# Patient Record
Sex: MALE | Race: WHITE | Employment: OTHER | URBAN - METROPOLITAN AREA
[De-identification: names, ages, dates, MRNs, and addresses within clinical notes are randomized per-mention and may not be internally consistent; named-entity substitution may affect disease eponyms.]

---

## 2017-04-27 ENCOUNTER — HOSPITAL ENCOUNTER (EMERGENCY)
Facility: CLINIC | Age: 82
Discharge: HOME | End: 2017-04-27
Attending: INTERNAL MEDICINE | Admitting: INTERNAL MEDICINE
Payer: COMMERCIAL

## 2017-04-27 VITALS — SYSTOLIC BLOOD PRESSURE: 156 MMHG | TEMPERATURE: 97.4 F | DIASTOLIC BLOOD PRESSURE: 78 MMHG | OXYGEN SATURATION: 92 %

## 2017-04-27 DIAGNOSIS — T83.511A URINARY TRACT INFECTION ASSOCIATED WITH CATHETERIZATION OF URINARY TRACT, UNSPECIFIED INDWELLING URINARY CATHETER TYPE, INITIAL ENCOUNTER (H): ICD-10-CM

## 2017-04-27 DIAGNOSIS — T83.9XXA FOLEY CATHETER PROBLEM, INITIAL ENCOUNTER (H): ICD-10-CM

## 2017-04-27 DIAGNOSIS — N39.0 URINARY TRACT INFECTION ASSOCIATED WITH CATHETERIZATION OF URINARY TRACT, UNSPECIFIED INDWELLING URINARY CATHETER TYPE, INITIAL ENCOUNTER (H): ICD-10-CM

## 2017-04-27 LAB
ALBUMIN UR-MCNC: 100 MG/DL
ANION GAP SERPL CALCULATED.3IONS-SCNC: 7 MMOL/L (ref 3–14)
APPEARANCE UR: ABNORMAL
BACTERIA #/AREA URNS HPF: ABNORMAL /HPF
BASOPHILS # BLD AUTO: 0 10E9/L (ref 0–0.2)
BASOPHILS NFR BLD AUTO: 0.2 %
BILIRUB UR QL STRIP: NEGATIVE
BUN SERPL-MCNC: 30 MG/DL (ref 7–30)
CALCIUM SERPL-MCNC: 10.9 MG/DL (ref 8.5–10.1)
CHLORIDE SERPL-SCNC: 112 MMOL/L (ref 94–109)
CO2 SERPL-SCNC: 22 MMOL/L (ref 20–32)
COLOR UR AUTO: ABNORMAL
CREAT SERPL-MCNC: 1.37 MG/DL (ref 0.66–1.25)
DIFFERENTIAL METHOD BLD: ABNORMAL
EOSINOPHIL # BLD AUTO: 0 10E9/L (ref 0–0.7)
EOSINOPHIL NFR BLD AUTO: 0.1 %
ERYTHROCYTE [DISTWIDTH] IN BLOOD BY AUTOMATED COUNT: 13.9 % (ref 10–15)
GFR SERPL CREATININE-BSD FRML MDRD: 49 ML/MIN/1.7M2
GLUCOSE SERPL-MCNC: 98 MG/DL (ref 70–99)
GLUCOSE UR STRIP-MCNC: NEGATIVE MG/DL
HCT VFR BLD AUTO: 34.6 % (ref 40–53)
HGB BLD-MCNC: 10.9 G/DL (ref 13.3–17.7)
HGB UR QL STRIP: ABNORMAL
IMM GRANULOCYTES # BLD: 0 10E9/L (ref 0–0.4)
IMM GRANULOCYTES NFR BLD: 0.2 %
KETONES UR STRIP-MCNC: NEGATIVE MG/DL
LEUKOCYTE ESTERASE UR QL STRIP: ABNORMAL
LYMPHOCYTES # BLD AUTO: 3 10E9/L (ref 0.8–5.3)
LYMPHOCYTES NFR BLD AUTO: 34.4 %
MCH RBC QN AUTO: 30.3 PG (ref 26.5–33)
MCHC RBC AUTO-ENTMCNC: 31.5 G/DL (ref 31.5–36.5)
MCV RBC AUTO: 96 FL (ref 78–100)
MONOCYTES # BLD AUTO: 1 10E9/L (ref 0–1.3)
MONOCYTES NFR BLD AUTO: 11.4 %
MUCOUS THREADS #/AREA URNS LPF: PRESENT /LPF
NEUTROPHILS # BLD AUTO: 4.7 10E9/L (ref 1.6–8.3)
NEUTROPHILS NFR BLD AUTO: 53.7 %
NITRATE UR QL: POSITIVE
NRBC # BLD AUTO: 0 10*3/UL
NRBC BLD AUTO-RTO: 0 /100
PH UR STRIP: 7 PH (ref 5–7)
PLATELET # BLD AUTO: 279 10E9/L (ref 150–450)
POTASSIUM SERPL-SCNC: 4.1 MMOL/L (ref 3.4–5.3)
RBC # BLD AUTO: 3.6 10E12/L (ref 4.4–5.9)
RBC #/AREA URNS AUTO: 122 /HPF (ref 0–2)
SODIUM SERPL-SCNC: 141 MMOL/L (ref 133–144)
SP GR UR STRIP: 1.02 (ref 1–1.03)
SQUAMOUS #/AREA URNS AUTO: 3 /HPF (ref 0–1)
URN SPEC COLLECT METH UR: ABNORMAL
UROBILINOGEN UR STRIP-MCNC: 0 MG/DL (ref 0–2)
WBC # BLD AUTO: 8.8 10E9/L (ref 4–11)
WBC #/AREA URNS AUTO: >182 /HPF (ref 0–2)
WBC CLUMPS #/AREA URNS HPF: PRESENT /HPF

## 2017-04-27 PROCEDURE — 99283 EMERGENCY DEPT VISIT LOW MDM: CPT | Mod: 25

## 2017-04-27 PROCEDURE — 36415 COLL VENOUS BLD VENIPUNCTURE: CPT | Performed by: EMERGENCY MEDICINE

## 2017-04-27 PROCEDURE — 85025 COMPLETE CBC W/AUTO DIFF WBC: CPT | Performed by: EMERGENCY MEDICINE

## 2017-04-27 PROCEDURE — 87086 URINE CULTURE/COLONY COUNT: CPT | Performed by: EMERGENCY MEDICINE

## 2017-04-27 PROCEDURE — 87088 URINE BACTERIA CULTURE: CPT | Performed by: EMERGENCY MEDICINE

## 2017-04-27 PROCEDURE — 81001 URINALYSIS AUTO W/SCOPE: CPT | Performed by: EMERGENCY MEDICINE

## 2017-04-27 PROCEDURE — 80048 BASIC METABOLIC PNL TOTAL CA: CPT | Performed by: EMERGENCY MEDICINE

## 2017-04-27 PROCEDURE — 51702 INSERT TEMP BLADDER CATH: CPT

## 2017-04-27 RX ORDER — CIPROFLOXACIN 500 MG/1
500 TABLET, FILM COATED ORAL 2 TIMES DAILY
Qty: 14 TABLET | Refills: 0 | Status: SHIPPED | OUTPATIENT
Start: 2017-04-27 | End: 2017-05-04

## 2017-04-27 NOTE — DISCHARGE INSTRUCTIONS
We were unable to replace the suprapubic catheter so have placed a temporary rutledge catheter. Please follow up with Urology for suprapubic cath placement.  You were also found to have a bladder infection; take ciprofloxacin for this.  Rutledge Catheter Care    A Rutledge catheter is a rubber tube that is placed through the urethra (opening where urine comes out) and into the bladder. This helps drain urine from the bladder. There is a small balloon on the end of the tube that is inflated after insertion. This keeps the catheter from sliding out of the bladder.  A Rutledge catheter is used to treat urinary retention (unable to pass urine). It is also used when there is incontinence (loss of bladder control).  Home care    Finish taking any prescribed antibiotic even if you are feeling better before then.    It is important to keep bacteria from getting into the collection bag. Do not disconnect the catheter from the collection bag.    Use a leg band to secure the drainage tube, so it does not pull on the catheter. Drain the collection bag when it becomes full using the drain spout at the bottom of the bag.    Do not try to pull or remove your catheter. This will injure your urethra. It must be removed by a doctor or nurse.  Follow-up care  Follow up with your doctor as advised for repeat urine testing and catheter removal or replacement.  When to seek medical care  Get prompt medical attention if any of the following occur:    Fever of 100.4 F (38 C) or higher, or as directed by your health care provider    Bladder pain or fullness    Abdominal swelling, nausea or vomiting or back pain    Blood or urine leakage around the catheter    Bloody urine coming from the catheter (if a new symptom)    Catheter falls out    Catheter stops draining for 6 hours    Weakness, dizziness or fainting    2125-8513 [employer]. [address] [place] [address]. All rights reserved. This information is not intended as a  substitute for professional medical care. Always follow your healthcare professional's instructions.

## 2017-04-27 NOTE — ED AVS SNAPSHOT
[facility] Emergency Department    [address] [place] [address]    Phone:  [phone]    Fax:  [phone]                                       Edil Lopez   MRN: [id number]    Department:  [facility] Emergency Department   Date of Visit:  4/27/2017           Patient Information     Date Of Birth          7/21/1931        Your diagnoses for this visit were:     Rutledge catheter problem, initial encounter (H)     Urinary tract infection associated with catheterization of urinary tract, unspecified indwelling urinary catheter type, initial encounter        You were seen by Mary Butt MD and Kyle Anna MD.      Follow-up Information     Follow up with Montrell Kendall MD In 2 days.    Specialty:  Urology    Contact information:    [facility]  [address]  [facility] [address] [phone]          Discharge Instructions       We were unable to replace the suprapubic catheter so have placed a temporary rutledge catheter. Please follow up with Urology for suprapubic cath placement.  You were also found to have a bladder infection; take ciprofloxacin for this.  Rutledge Catheter Care    A Rutledge catheter is a rubber tube that is placed through the urethra (opening where urine comes out) and into the bladder. This helps drain urine from the bladder. There is a small balloon on the end of the tube that is inflated after insertion. This keeps the catheter from sliding out of the bladder.  A Rutledge catheter is used to treat urinary retention (unable to pass urine). It is also used when there is incontinence (loss of bladder control).  Home care    Finish taking any prescribed antibiotic even if you are feeling better before then.    It is important to keep bacteria from getting into the collection bag. Do not disconnect the catheter from the collection bag.    Use a leg band to secure the drainage tube, so it does not pull on the catheter.  Drain the collection bag when it becomes full using the drain spout at the bottom of the bag.    Do not try to pull or remove your catheter. This will injure your urethra. It must be removed by a doctor or nurse.  Follow-up care  Follow up with your doctor as advised for repeat urine testing and catheter removal or replacement.  When to seek medical care  Get prompt medical attention if any of the following occur:    Fever of 100.4 F (38 C) or higher, or as directed by your health care provider    Bladder pain or fullness    Abdominal swelling, nausea or vomiting or back pain    Blood or urine leakage around the catheter    Bloody urine coming from the catheter (if a new symptom)    Catheter falls out    Catheter stops draining for 6 hours    Weakness, dizziness or fainting    8546-8064 [employer]. [address] [place] [address], [place] [address]. All rights reserved. This information is not intended as a substitute for professional medical care. Always follow your healthcare professional's instructions.          24 Hour Appointment Hotline       To make an appointment at any [facility], call [phone] ([phone]). If you don't have a family doctor or clinic, we will help you find one. [place] clinics are conveniently located to serve the needs of you and your family.             Review of your medicines      START taking        Dose / Directions Last dose taken    ciprofloxacin 500 MG tablet   Commonly known as:  CIPRO   Dose:  500 mg   Quantity:  14 tablet        Take 1 tablet (500 mg) by mouth 2 times daily for 7 days   Refills:  0          Our records show that you are taking the medicines listed below. If these are incorrect, please call your family doctor or clinic.        Dose / Directions Last dose taken    aspirin 81 MG chewable tablet   Dose:  81 mg        Take 81 mg by mouth daily   Refills:  0        BISACODYL PO        Refills:  0        METOPROLOL SUCCINATE ER PO   Dose:  200 mg         Take 200 mg by mouth   Refills:  0        OXYCODONE HCL PO   Dose:  2.5 mg        Take 2.5 mg by mouth   Refills:  0        RANITIDINE HCL PO   Dose:  75 mg        Take 75 mg by mouth   Refills:  0                Prescriptions were sent or printed at these locations (1 Prescription)                   Other Prescriptions                Printed at Department/Unit printer (1 of 1)         ciprofloxacin (CIPRO) 500 MG tablet                Procedures and tests performed during your visit     Basic metabolic panel (BMP)    CBC + differential    UA with Microscopic    Urine Culture Aerobic Bacterial      Orders Needing Specimen Collection     None      Pending Results     Date and Time Order Name Status Description    4/27/2017 1658 Urine Culture Aerobic Bacterial In process             Pending Culture Results     Date and Time Order Name Status Description    4/27/2017 1658 Urine Culture Aerobic Bacterial In process             Test Results From Your Hospital Stay        4/27/2017  4:43 PM      Component Results     Component Value Ref Range & Units Status    Sodium 141 133 - 144 mmol/L Final    Potassium 4.1 3.4 - 5.3 mmol/L Final    Chloride 112 (H) 94 - 109 mmol/L Final    Carbon Dioxide 22 20 - 32 mmol/L Final    Anion Gap 7 3 - 14 mmol/L Final    Glucose 98 70 - 99 mg/dL Final    Urea Nitrogen 30 7 - 30 mg/dL Final    Creatinine 1.37 (H) 0.66 - 1.25 mg/dL Final    GFR Estimate 49 (L) >60 mL/min/1.7m2 Final    Non  GFR Calc    GFR Estimate If Black 60 (L) >60 mL/min/1.7m2 Final    African American GFR Calc    Calcium 10.9 (H) 8.5 - 10.1 mg/dL Final         4/27/2017  4:28 PM      Component Results     Component Value Ref Range & Units Status    WBC 8.8 4.0 - 11.0 10e9/L Final    RBC Count 3.60 (L) 4.4 - 5.9 10e12/L Final    Hemoglobin 10.9 (L) 13.3 - 17.7 g/dL Final    Hematocrit 34.6 (L) 40.0 - 53.0 % Final    MCV 96 78 - 100 fl Final    MCH 30.3 26.5 - 33.0 pg Final    MCHC 31.5 31.5 - 36.5  g/dL Final    RDW 13.9 10.0 - 15.0 % Final    Platelet Count 279 150 - 450 10e9/L Final    Diff Method Automated Method  Final    % Neutrophils 53.7 % Final    % Lymphocytes 34.4 % Final    % Monocytes 11.4 % Final    % Eosinophils 0.1 % Final    % Basophils 0.2 % Final    % Immature Granulocytes 0.2 % Final    Nucleated RBCs 0 0 /100 Final    Absolute Neutrophil 4.7 1.6 - 8.3 10e9/L Final    Absolute Lymphocytes 3.0 0.8 - 5.3 10e9/L Final    Absolute Monocytes 1.0 0.0 - 1.3 10e9/L Final    Absolute Eosinophils 0.0 0.0 - 0.7 10e9/L Final    Absolute Basophils 0.0 0.0 - 0.2 10e9/L Final    Abs Immature Granulocytes 0.0 0 - 0.4 10e9/L Final    Absolute Nucleated RBC 0.0  Final         4/27/2017  4:45 PM      Component Results     Component Value Ref Range & Units Status    Color Urine Katie  Final    Appearance Urine Cloudy  Final    Glucose Urine Negative NEG mg/dL Final    Bilirubin Urine Negative NEG Final    Ketones Urine Negative NEG mg/dL Final    Specific Gravity Urine 1.018 1.003 - 1.035 Final    Blood Urine Moderate (A) NEG Final    pH Urine 7.0 5.0 - 7.0 pH Final    Protein Albumin Urine 100 (A) NEG mg/dL Final    Urobilinogen mg/dL 0.0 0.0 - 2.0 mg/dL Final    Nitrite Urine Positive (A) NEG Final    Leukocyte Esterase Urine Large (A) NEG Final    Source Catheterized Urine  Final    WBC Urine >182 (H) 0 - 2 /HPF Final    RBC Urine 122 (H) 0 - 2 /HPF Final    WBC Clumps Present (A) NEG /HPF Final    Bacteria Urine Many (A) NEG /HPF Final    Squamous Epithelial /HPF Urine 3 (H) 0 - 1 /HPF Final    Mucous Urine Present (A) NEG /LPF Final         4/27/2017  5:01 PM                Clinical Quality Measure: Blood Pressure Screening     Your blood pressure was checked while you were in the emergency department today. The last reading we obtained was  BP: (!) 144/95 . Please read the guidelines below about what these numbers mean and what you should do about them.  If your systolic blood pressure (the top number) is  "less than 120 and your diastolic blood pressure (the bottom number) is less than 80, then your blood pressure is normal. There is nothing more that you need to do about it.  If your systolic blood pressure (the top number) is 120-139 or your diastolic blood pressure (the bottom number) is 80-89, your blood pressure may be higher than it should be. You should have your blood pressure rechecked within a year by a primary care provider.  If your systolic blood pressure (the top number) is 140 or greater or your diastolic blood pressure (the bottom number) is 90 or greater, you may have high blood pressure. High blood pressure is treatable, but if left untreated over time it can put you at risk for heart attack, stroke, or kidney failure. You should have your blood pressure rechecked by a primary care provider within the next 4 weeks.  If your provider in the emergency department today gave you specific instructions to follow-up with your doctor or provider even sooner than that, you should follow that instruction and not wait for up to 4 weeks for your follow-up visit.        Thank you for choosing [facility]       Thank you for choosing [facility] for your care. Our goal is always to provide you with excellent care. Hearing back from our patients is one way we can continue to improve our services. Please take a few minutes to complete the written survey that you may receive in the mail after you visit with us. Thank you!        Pounce Information     Pounce lets you send messages to your doctor, view your test results, renew your prescriptions, schedule appointments and more. To sign up, go to [email or web address] . Click on \"Log in\" on the left side of the screen, which will take you to the Welcome page. Then click on \"Sign up Now\" on the right side of the page.     You will be asked to enter the access code listed below, as well as some personal information. Please follow the directions to create your username and " password.     Your access code is: [email or web address]  Expires: 2017  5:08 PM     Your access code will  in 90 days. If you need help or a new code, please call your [facility] or [phone].        Care EveryWhere ID     This is your Care EveryWhere ID. This could be used by other organizations to access your [facility] medical records  [id number]        After Visit Summary       This is your record. Keep this with you and show to your community pharmacist(s) and doctor(s) at your next visit.

## 2017-04-27 NOTE — ED PROVIDER NOTES
History     Chief Complaint:  Suprapubic catheter problem       HPI   History limited secondary to patient's dementia.     Edil Lopez is a 85 year old male with a history of dementia, undefined neuromuscular disorder, and chronic indwelling suprapubic catheter who presents via EMS with a dislodged cathter and the urge to urinate. The patient was sent to the ED from his care facility as staff was unable to replace his suprapubic catheter today. When this has happened previously, it has been difficult for ED as well as Urology staff to place a suprapubic catheter and he did not tolerate a rutledge cathter.       Allergies:  Lisinopril  Penicillins     Medications:    Oxycodone  Aspirin   Bisacodyl  Metoprolol Succinate  Ranitidine     Past Medical History:    Anemia  Chronic Kidney Disease  CVA  GERD  Hypertension   Neuromuscular Disorder  Thyroid Disease    Past Surgical History:    History reviewed. No pertinent past surgical history.     Family History:    History reviewed. No pertinent family history.     Social History:  Marital Status:   Presents to the ED alone  Tobacco Use: Former Smoker  Alcohol Use: No  PCP: Shirley Toscano     Review of Systems   Genitourinary: Positive for urgency.        Positive for dislodged suprapubic catheter    Otherwise limited secondary to patient's dementia     Physical Exam   First Vitals:  BP: (!) 165/108  Heart Rate: 64  Temp: 97.4  F (36.3  C)  SpO2: 94 %    Physical Exam   Constitutional: Alert, attentive  HENT:    Nose: Nose normal.    Mouth/Throat: Oropharynx is clear, mucous membranes are moist   Eyes: EOM are normal. Pupils are equal, round, and reactive to light.   CV: regular rate and rhythm; no murmurs, rubs or gallups  Chest: Effort normal and breath sounds normal.   GI:  There is no tenderness. No distension. Normal bowel sounds   Suprapubic cath site clean, dry and intact; catheter absent  MSK: Normal range of motion.   Neurological: Alert, attentive  Skin:  Skin is warm and dry.      Emergency Department Course   Laboratory:  CBC:  WBC 8.8, HGB 10.9 (L),   BMP: Chloride 112 (H), Creatinine 1.37 (H), GFR 49 (L), Calcium 10.9, otherwise WNL      UA: Cloudy Katie urine, Blood moderate, Protein 100, Nitrite Positive, Leukocyte Esterase large, WBC>182 (H),  (H), WBC clumps present, Bacteria many, Squamous Epithelial 3 (H), Mucous present, otherwise WNL   Urine Culture: pending     Emergency Department Course:  Nursing notes and vitals reviewed.  I performed an exam of the patient as documented above.   Blood was drawn from the patient. This was sent for laboratory testing, findings above.    Urine sample was obtained and sent for laboratory analysis, findings above.   Findings and plan explained to the patient. Patient discharged home with instructions regarding supportive care, medications, and reasons to return. The importance of close follow-up was reviewed.  Staff and I attempted placement of a suprapubic catheter x 2 unsuccessfully. ERT placed successfully a Rutledge catheter.   The patient was prescribed Cipro   I personally reviewed the laboratory results with the patient and answered all related questions prior to discharge.      Impression & Plan    Medical Decision Making:  Edil Lopez is a 85 year old male with a history of stroke and unclear neuromuscular disorder with longstanding suprapubic catheter who presents after dislodgement. As per previous, neither nor nursing staff were able to place a suprapubic catheter. Urology has been previously unable to do so as well in the past. Therefore, rutledge catheter was placed. The results were suggestive of UTI so ciprofloxacin will be started. Urine is cultured. He will be discharged back to his assisted living with plan for outpatient suprapubic cathter placement likely by IR. Urology was operating throughout his ED course and unable to come to assist, but regardless I suspect that IR will be needed or  cystoscopy as an outpatient to reinsert a suprapubic cath. He should follow up with urology in 2-3 days and return immediately for fever, vomiting, or any other concerns.       Diagnosis:    ICD-10-CM   1. Ortiz catheter problem, initial encounter (H) T83.9XXA               2. Urinary tract infection associated with catheterization of urinary tract, unspecified indwelling urinary catheter type, initial encounter T83.511A    N39.0       Disposition:  discharged to home    Discharge Medications:  New Prescriptions    CIPROFLOXACIN (CIPRO) 500 MG TABLET    Take 1 tablet (500 mg) by mouth 2 times daily for 7 days         Stephanie MARISCAL, eugene serving as a scribe on 4/27/2017 at 2:05 PM to personally document services performed by Dr. Anna based on my observations and the provider's statements to me.    4/27/2017   [facility] EMERGENCY DEPARTMENT       Kyle Anna MD  05/10/17 9025

## 2017-04-27 NOTE — ED AVS SNAPSHOT
[facility] Emergency Department    [address]    [facility] [address]    Phone:  [phone]    Fax:  [phone]                                       Edil Lopez   MRN: [id number]    Department:  [facility] Emergency Department   Date of Visit:  4/27/2017           After Visit Summary Signature Page     I have received my discharge instructions, and my questions have been answered. I have discussed any challenges I see with this plan with the nurse or doctor.    ..........................................................................................................................................  Patient/Patient Representative Signature      ..........................................................................................................................................  Patient Representative Print Name and Relationship to Patient    ..................................................               ................................................  Date                                            Time    ..........................................................................................................................................  Reviewed by Signature/Title    ...................................................              ..............................................  Date                                                            Time

## 2017-04-27 NOTE — ED NOTES
Pt arrived to ER via EMS from [facility] with reports of a suprapubic cath problem in which they tried to change it today and were unable to exchange & replace SPC. [place], altered mental status per reports of baseline & self c/o being itchy. Pt did receive 5 mg of oxycodone prior to EMS arrival to facility. Packet of pt's paperwork from facility from EMS put on chart.

## 2017-04-29 LAB
BACTERIA SPEC CULT: ABNORMAL
Lab: ABNORMAL
MICRO REPORT STATUS: ABNORMAL
SPECIMEN SOURCE: ABNORMAL

## 2017-05-01 NOTE — ED PROVIDER NOTES
Patient seen briefly on arrival. I attempted to place a new suprapubic catheter through existing tract without success. I have signed the patient out to Dr. Anna for further evaluation and management.      Mary Butt MD  05/01/17 8522

## 2017-05-22 ENCOUNTER — HOSPITAL ENCOUNTER (INPATIENT)
Facility: CLINIC | Age: 82
LOS: 9 days | Discharge: HOSPICE | DRG: 683 | End: 2017-06-01
Attending: EMERGENCY MEDICINE | Admitting: INTERNAL MEDICINE
Payer: COMMERCIAL

## 2017-05-22 ENCOUNTER — APPOINTMENT (OUTPATIENT)
Dept: CT IMAGING | Facility: CLINIC | Age: 82
DRG: 683 | End: 2017-05-22
Attending: EMERGENCY MEDICINE
Payer: COMMERCIAL

## 2017-05-22 ENCOUNTER — APPOINTMENT (OUTPATIENT)
Dept: GENERAL RADIOLOGY | Facility: CLINIC | Age: 82
DRG: 683 | End: 2017-05-22
Attending: EMERGENCY MEDICINE
Payer: COMMERCIAL

## 2017-05-22 DIAGNOSIS — R45.1 AGITATION: ICD-10-CM

## 2017-05-22 DIAGNOSIS — T83.021A DISLODGED FOLEY CATHETER, INITIAL ENCOUNTER: ICD-10-CM

## 2017-05-22 DIAGNOSIS — N18.9 CHRONIC KIDNEY DISEASE, UNSPECIFIED STAGE: ICD-10-CM

## 2017-05-22 DIAGNOSIS — R00.1 BRADYCARDIA: ICD-10-CM

## 2017-05-22 DIAGNOSIS — K59.03 DRUG-INDUCED CONSTIPATION: ICD-10-CM

## 2017-05-22 DIAGNOSIS — K21.9 GASTROESOPHAGEAL REFLUX DISEASE, ESOPHAGITIS PRESENCE NOT SPECIFIED: ICD-10-CM

## 2017-05-22 DIAGNOSIS — F03.90 DEMENTIA WITHOUT BEHAVIORAL DISTURBANCE, UNSPECIFIED DEMENTIA TYPE: ICD-10-CM

## 2017-05-22 DIAGNOSIS — G89.29 OTHER CHRONIC PAIN: Primary | ICD-10-CM

## 2017-05-22 LAB
ALBUMIN UR-MCNC: 100 MG/DL
ANION GAP SERPL CALCULATED.3IONS-SCNC: 9 MMOL/L (ref 3–14)
APPEARANCE UR: ABNORMAL
BACTERIA #/AREA URNS HPF: ABNORMAL /HPF
BILIRUB UR QL STRIP: NEGATIVE
BUN SERPL-MCNC: 57 MG/DL (ref 7–30)
CALCIUM SERPL-MCNC: 10.9 MG/DL (ref 8.5–10.1)
CHLORIDE SERPL-SCNC: 112 MMOL/L (ref 94–109)
CO2 SERPL-SCNC: 21 MMOL/L (ref 20–32)
COLOR UR AUTO: ABNORMAL
CREAT SERPL-MCNC: 1.81 MG/DL (ref 0.66–1.25)
ERYTHROCYTE [DISTWIDTH] IN BLOOD BY AUTOMATED COUNT: 14.4 % (ref 10–15)
GFR SERPL CREATININE-BSD FRML MDRD: 36 ML/MIN/1.7M2
GLUCOSE SERPL-MCNC: 142 MG/DL (ref 70–99)
GLUCOSE UR STRIP-MCNC: NEGATIVE MG/DL
HCT VFR BLD AUTO: 31.4 % (ref 40–53)
HGB BLD-MCNC: 9.7 G/DL (ref 13.3–17.7)
HGB UR QL STRIP: ABNORMAL
KETONES UR STRIP-MCNC: NEGATIVE MG/DL
LACTATE BLD-SCNC: 2 MMOL/L (ref 0.7–2.1)
LACTATE BLD-SCNC: 3.9 MMOL/L (ref 0.7–2.1)
LEUKOCYTE ESTERASE UR QL STRIP: ABNORMAL
MCH RBC QN AUTO: 29.2 PG (ref 26.5–33)
MCHC RBC AUTO-ENTMCNC: 30.9 G/DL (ref 31.5–36.5)
MCV RBC AUTO: 95 FL (ref 78–100)
MUCOUS THREADS #/AREA URNS LPF: PRESENT /LPF
NITRATE UR QL: NEGATIVE
PH UR STRIP: 5 PH (ref 5–7)
PLATELET # BLD AUTO: 308 10E9/L (ref 150–450)
POTASSIUM SERPL-SCNC: 4.2 MMOL/L (ref 3.4–5.3)
RBC # BLD AUTO: 3.32 10E12/L (ref 4.4–5.9)
RBC #/AREA URNS AUTO: 32 /HPF (ref 0–2)
SODIUM SERPL-SCNC: 142 MMOL/L (ref 133–144)
SP GR UR STRIP: 1.02 (ref 1–1.03)
SQUAMOUS #/AREA URNS AUTO: 1 /HPF (ref 0–1)
TRANS CELLS #/AREA URNS HPF: 2 /HPF (ref 0–1)
TROPONIN I SERPL-MCNC: 0.04 UG/L (ref 0–0.04)
URN SPEC COLLECT METH UR: ABNORMAL
UROBILINOGEN UR STRIP-MCNC: 0 MG/DL (ref 0–2)
WBC # BLD AUTO: 13.2 10E9/L (ref 4–11)
WBC #/AREA URNS AUTO: >182 /HPF (ref 0–2)
WBC CLUMPS #/AREA URNS HPF: PRESENT /HPF

## 2017-05-22 PROCEDURE — 83605 ASSAY OF LACTIC ACID: CPT | Performed by: INTERNAL MEDICINE

## 2017-05-22 PROCEDURE — 83605 ASSAY OF LACTIC ACID: CPT | Performed by: PHYSICIAN ASSISTANT

## 2017-05-22 PROCEDURE — G0378 HOSPITAL OBSERVATION PER HR: HCPCS

## 2017-05-22 PROCEDURE — 36415 COLL VENOUS BLD VENIPUNCTURE: CPT | Performed by: PHYSICIAN ASSISTANT

## 2017-05-22 PROCEDURE — 25000128 H RX IP 250 OP 636: Performed by: PHYSICIAN ASSISTANT

## 2017-05-22 PROCEDURE — 99285 EMERGENCY DEPT VISIT HI MDM: CPT | Mod: 25

## 2017-05-22 PROCEDURE — 25000125 ZZHC RX 250: Performed by: INTERNAL MEDICINE

## 2017-05-22 PROCEDURE — 87186 SC STD MICRODIL/AGAR DIL: CPT | Performed by: EMERGENCY MEDICINE

## 2017-05-22 PROCEDURE — 87088 URINE BACTERIA CULTURE: CPT | Performed by: EMERGENCY MEDICINE

## 2017-05-22 PROCEDURE — 85027 COMPLETE CBC AUTOMATED: CPT | Performed by: EMERGENCY MEDICINE

## 2017-05-22 PROCEDURE — 25000128 H RX IP 250 OP 636: Performed by: EMERGENCY MEDICINE

## 2017-05-22 PROCEDURE — 71010 XR CHEST PORT 1 VW: CPT

## 2017-05-22 PROCEDURE — 84484 ASSAY OF TROPONIN QUANT: CPT | Performed by: EMERGENCY MEDICINE

## 2017-05-22 PROCEDURE — 25000125 ZZHC RX 250: Performed by: EMERGENCY MEDICINE

## 2017-05-22 PROCEDURE — 36415 COLL VENOUS BLD VENIPUNCTURE: CPT | Performed by: EMERGENCY MEDICINE

## 2017-05-22 PROCEDURE — 25000132 ZZH RX MED GY IP 250 OP 250 PS 637: Performed by: PHYSICIAN ASSISTANT

## 2017-05-22 PROCEDURE — 99220 ZZC INITIAL OBSERVATION CARE,LEVL III: CPT | Performed by: PHYSICIAN ASSISTANT

## 2017-05-22 PROCEDURE — 51798 US URINE CAPACITY MEASURE: CPT

## 2017-05-22 PROCEDURE — 51702 INSERT TEMP BLADDER CATH: CPT

## 2017-05-22 PROCEDURE — 81001 URINALYSIS AUTO W/SCOPE: CPT | Performed by: EMERGENCY MEDICINE

## 2017-05-22 PROCEDURE — 87086 URINE CULTURE/COLONY COUNT: CPT | Performed by: EMERGENCY MEDICINE

## 2017-05-22 PROCEDURE — 40000916 ZZH STATISTIC SITTER, NIGHT HOURS

## 2017-05-22 PROCEDURE — 74176 CT ABD & PELVIS W/O CONTRAST: CPT

## 2017-05-22 PROCEDURE — 96374 THER/PROPH/DIAG INJ IV PUSH: CPT

## 2017-05-22 PROCEDURE — 40000915 ZZH STATISTIC SITTER, EVENING HOURS

## 2017-05-22 PROCEDURE — 80048 BASIC METABOLIC PNL TOTAL CA: CPT | Performed by: EMERGENCY MEDICINE

## 2017-05-22 PROCEDURE — 36415 COLL VENOUS BLD VENIPUNCTURE: CPT | Performed by: INTERNAL MEDICINE

## 2017-05-22 RX ORDER — SODIUM CHLORIDE 9 MG/ML
INJECTION, SOLUTION INTRAVENOUS CONTINUOUS
Status: DISCONTINUED | OUTPATIENT
Start: 2017-05-22 | End: 2017-05-23

## 2017-05-22 RX ORDER — LORAZEPAM 2 MG/ML
0.5 CONCENTRATE ORAL 2 TIMES DAILY
Status: ON HOLD | COMMUNITY
End: 2017-06-01

## 2017-05-22 RX ORDER — POLYETHYLENE GLYCOL 3350 17 G/17G
17 POWDER, FOR SOLUTION ORAL DAILY
Status: DISCONTINUED | OUTPATIENT
Start: 2017-05-22 | End: 2017-06-01 | Stop reason: HOSPADM

## 2017-05-22 RX ORDER — LORAZEPAM 2 MG/ML
0.5 CONCENTRATE ORAL EVERY 4 HOURS PRN
Status: DISCONTINUED | OUTPATIENT
Start: 2017-05-22 | End: 2017-05-25

## 2017-05-22 RX ORDER — ONDANSETRON 4 MG/1
4 TABLET, ORALLY DISINTEGRATING ORAL EVERY 6 HOURS PRN
Status: DISCONTINUED | OUTPATIENT
Start: 2017-05-22 | End: 2017-05-27

## 2017-05-22 RX ORDER — ACETAMINOPHEN 500 MG
1000 TABLET ORAL 3 TIMES DAILY
Status: DISCONTINUED | OUTPATIENT
Start: 2017-05-22 | End: 2017-05-30

## 2017-05-22 RX ORDER — AMOXICILLIN 250 MG
2 CAPSULE ORAL DAILY
Status: ON HOLD | COMMUNITY
End: 2017-06-01

## 2017-05-22 RX ORDER — AMOXICILLIN 250 MG
2 CAPSULE ORAL DAILY
Status: DISCONTINUED | OUTPATIENT
Start: 2017-05-22 | End: 2017-05-22

## 2017-05-22 RX ORDER — KETOROLAC TROMETHAMINE 15 MG/ML
15 INJECTION, SOLUTION INTRAMUSCULAR; INTRAVENOUS ONCE
Status: COMPLETED | OUTPATIENT
Start: 2017-05-22 | End: 2017-05-22

## 2017-05-22 RX ORDER — NALOXONE HYDROCHLORIDE 0.4 MG/ML
.1-.4 INJECTION, SOLUTION INTRAMUSCULAR; INTRAVENOUS; SUBCUTANEOUS
Status: DISCONTINUED | OUTPATIENT
Start: 2017-05-22 | End: 2017-06-01 | Stop reason: HOSPADM

## 2017-05-22 RX ORDER — LEVOFLOXACIN 5 MG/ML
500 INJECTION, SOLUTION INTRAVENOUS EVERY 24 HOURS
Status: DISCONTINUED | OUTPATIENT
Start: 2017-05-23 | End: 2017-05-22

## 2017-05-22 RX ORDER — SENNOSIDES 8.6 MG
2 TABLET ORAL EVERY 12 HOURS PRN
Status: ON HOLD | COMMUNITY
End: 2017-06-01

## 2017-05-22 RX ORDER — SENNOSIDES 8.6 MG
2 TABLET ORAL AT BEDTIME
Status: ON HOLD | COMMUNITY
End: 2017-06-01

## 2017-05-22 RX ORDER — LORAZEPAM 2 MG/ML
0.5 CONCENTRATE ORAL EVERY 4 HOURS PRN
Status: ON HOLD | COMMUNITY
End: 2017-06-01

## 2017-05-22 RX ORDER — LEVOFLOXACIN 5 MG/ML
250 INJECTION, SOLUTION INTRAVENOUS EVERY 24 HOURS
Status: DISCONTINUED | OUTPATIENT
Start: 2017-05-22 | End: 2017-05-22

## 2017-05-22 RX ORDER — LEVOFLOXACIN 5 MG/ML
500 INJECTION, SOLUTION INTRAVENOUS EVERY 24 HOURS
Status: COMPLETED | OUTPATIENT
Start: 2017-05-22 | End: 2017-05-22

## 2017-05-22 RX ORDER — LORAZEPAM 2 MG/ML
0.5 CONCENTRATE ORAL 2 TIMES DAILY
Status: DISCONTINUED | OUTPATIENT
Start: 2017-05-22 | End: 2017-05-26

## 2017-05-22 RX ORDER — NALOXONE HYDROCHLORIDE 0.4 MG/ML
.1-.4 INJECTION, SOLUTION INTRAMUSCULAR; INTRAVENOUS; SUBCUTANEOUS
Status: DISCONTINUED | OUTPATIENT
Start: 2017-05-22 | End: 2017-05-22

## 2017-05-22 RX ORDER — OXYCODONE HYDROCHLORIDE 5 MG/1
5 TABLET ORAL 4 TIMES DAILY
Status: DISCONTINUED | OUTPATIENT
Start: 2017-05-22 | End: 2017-05-26

## 2017-05-22 RX ORDER — ONDANSETRON 2 MG/ML
4 INJECTION INTRAMUSCULAR; INTRAVENOUS EVERY 6 HOURS PRN
Status: DISCONTINUED | OUTPATIENT
Start: 2017-05-22 | End: 2017-05-27

## 2017-05-22 RX ORDER — AMOXICILLIN 250 MG
1-2 CAPSULE ORAL 2 TIMES DAILY
Status: DISCONTINUED | OUTPATIENT
Start: 2017-05-22 | End: 2017-06-01 | Stop reason: HOSPADM

## 2017-05-22 RX ORDER — POLYETHYLENE GLYCOL 3350 17 G/17G
17 POWDER, FOR SOLUTION ORAL DAILY
Status: ON HOLD | COMMUNITY
End: 2017-06-01

## 2017-05-22 RX ADMIN — SODIUM CHLORIDE 1000 ML: 9 INJECTION, SOLUTION INTRAVENOUS at 18:11

## 2017-05-22 RX ADMIN — KETOROLAC TROMETHAMINE 15 MG: 15 INJECTION, SOLUTION INTRAMUSCULAR; INTRAVENOUS at 07:32

## 2017-05-22 RX ADMIN — LEVOFLOXACIN 500 MG: 5 INJECTION, SOLUTION INTRAVENOUS at 20:27

## 2017-05-22 RX ADMIN — ACETAMINOPHEN 1000 MG: 500 TABLET, FILM COATED ORAL at 14:10

## 2017-05-22 RX ADMIN — SODIUM CHLORIDE: 9 INJECTION, SOLUTION INTRAVENOUS at 21:43

## 2017-05-22 RX ADMIN — LORAZEPAM 0.5 MG: 2 CONCENTRATE ORAL at 11:43

## 2017-05-22 RX ADMIN — OXYCODONE HYDROCHLORIDE 5 MG: 5 TABLET ORAL at 21:43

## 2017-05-22 RX ADMIN — ACETAMINOPHEN 1000 MG: 500 TABLET, FILM COATED ORAL at 21:43

## 2017-05-22 RX ADMIN — LIDOCAINE HYDROCHLORIDE 20 ML: 20 JELLY TOPICAL at 12:26

## 2017-05-22 RX ADMIN — LORAZEPAM 0.5 MG: 2 CONCENTRATE ORAL at 17:01

## 2017-05-22 RX ADMIN — LIDOCAINE HYDROCHLORIDE 10 ML: 20 JELLY TOPICAL at 18:06

## 2017-05-22 RX ADMIN — LORAZEPAM 0.5 MG: 2 CONCENTRATE ORAL at 20:29

## 2017-05-22 RX ADMIN — OXYCODONE HYDROCHLORIDE 5 MG: 5 TABLET ORAL at 16:39

## 2017-05-22 RX ADMIN — OXYCODONE HYDROCHLORIDE 5 MG: 5 TABLET ORAL at 12:02

## 2017-05-22 ASSESSMENT — PAIN DESCRIPTION - DESCRIPTORS: DESCRIPTORS: DISCOMFORT

## 2017-05-22 NOTE — ED PROVIDER NOTES
"  History     Chief Complaint:  Catheter Problem      HPI History limited secondary to the patient's underlying dementia.   Edil Lopez is a 85 year old male who presents with a catheter problem. Per chart review, they patient was in the emergency department about a month ago as his suprapubic catheter (for an unspecified neurogenic disorder) became dislodged. The decision was made at that time to establish a penile catheter, which has been in place since until about an hour an a half ago (he was also put on antibiotics at his last visit). Staff at this nursing facility were unable to re-establish the cathter this morning and the patient was sent here for further evaluation and treatment. The patient states that he can feel the urge to urinate \"coming on\". No known fevers or vomiting.  No trauma to penis noted.     Allergies:  Lisinopril   Penicillins      Medications:    Lorazepam po  Bisacodyl po  Metoprolol succinate er po  Oxycodone hcl po  Ranitidine hcl po  Aspirin 81 mg chewable tablet     Past Medical History:    Anemia   BPH (benign prostatic hyperplasia)   Chronic kidney disease   CVA (cerebral infarction)   Gastro-oesophageal reflux disease   Hyperparathyroidism    Hypertension   Hypothyroidism   Neuromuscular disorder    PVD (peripheral vascular disease)    Dementia    Past Surgical History:    Appendectomy  Endartectomy Carotid  Hernia repair   Suprapubic catheter placement     Family History:    History reviewed. No pertinent family history.     Social History:  Marital Status:   Presents to the ED alone  Tobacco Use: Former smoker  Alcohol Use: No  PCP: Shirley Toscano      Review of Systems   Genitourinary:        Positive for urge to urinate   ROS limited secondary to dementia.     Physical Exam     Patient Vitals for the past 24 hrs:   BP Temp Temp src Pulse Resp SpO2   05/22/17 0128 148/85 97.2  F (36.2  C) Temporal 47 20 97 %       Physical Exam  Nursing note and vitals " reviewed.  Constitutional:  Resting comfortably.   HENT:   Mouth/Throat: Mucous membranes are normal.   Cardiovascular: Bradycardic rate, regular rhythm and normal heart sounds.  No murmur.  Pulmonary/Chest: Effort normal and breath sounds normal. No respiratory distress. No wheezes. No rales.   Abdominal: Soft. Normal appearance and bowel sounds are normal. No distension. There is no tenderness. There is no rigidity and no guarding. Healed suprapubic catheter site noted.   : Has hyospatus on his circumcised penis. No blood at meatus.   Neurological: Alert. Oriented to self only  Skin: Skin is warm and dry.   Psychiatric: Normal mood and affect.       Emergency Department Course     Laboratory:  BMP: Chloride 112 (H), Glucose 142 (H), Creatinine 1.81 (H), Calcium 10.9 (H), GFR estimate 36 (L),  otherwise WNL  CBC:  WBC 13.2 (H), HGB 9.7 (L),        Interventions:  Normal Saline, 1 liter, IV bolus      Emergency Department Course:  Nursing notes and vitals reviewed.  I performed an exam of the patient as documented above.   (0225) Failed attempts for cathter placement by multiple staff members.   (0306) I consulted with Dr. Cruz of urology regarding the patient.    (8591) Bladder scanner reveals 187 mL's  Patient will be signed out to the oncoming physician.  Plan to reassess bladder volume, Urology to come in and see patient if retaining urine.     Impression & Plan      Medical Decision Making:  Edil Lopez is a 85 year old male who presents from [facility] where he is cared for due to his dementia. He previously had an indwelling suprapubic cathter which apparently has been out since it was pulled out by the patient about a month ago. He presents here having removed his rutledge catheter. He has a documented history of being an extremely hard patient to place a catheter in. Bedside ultrasound by myself does not show urine retention but does show what appear to be large bladder stones.   Attempts by myself and multiple staff members have been unable to pass a penile catheter here, though we were able to pass it quite a ways in.  Ultrasound was attempted to assist.  I discussed the case with Dr. Cruz who has managed this patient in the past. He has advised allowing the patient time to see if he is indeed passing urine, because if he is able to pass urine on his own, he does not need emergent consultation.  Normal Saline bolus given to assist. If he has a full bladder and is still unable to pass urine then urology will need to come in to likely perform video assisted cystoscopy for placement and decompression. His suprapubic site is completely shut at this point having not been used for the past month. Labs show a chronic renal insuffiencey, otherwise no findings that are remarkable. The patient was signed ou to my [facility] partner pending final disposition.       Diagnosis:    ICD-10-CM    1. Dislodged Ortiz catheter, initial encounter T83.021A    2. Chronic kidney disease, unspecified stage N18.9    3. Dementia without behavioral disturbance, unspecified dementia type F03.90        Disposition:  Patient will be signed on to the oncoming physician.         I, Joey Vincent , am serving as a scribe on 5/22/2017 at 1:35 AM to personally document services performed by Dr. Orona based on my observations and the provider's statements to me.     5/22/2017   [facility] EMERGENCY DEPARTMENT       Kyle Orona MD  05/22/17 0659

## 2017-05-22 NOTE — IP AVS SNAPSHOT
` ` Patient Information     Patient Name Sex     John, Edil GUTIERREZ ([id number]) Male 1931       Room Bed    352      Patient Demographics     Address Phone    [facility]  [address] [place] [address] [phone] (Home)  None (Work)  None (Mobile)      Patient Ethnicity & Race     Ethnic Group Patient Race    American White      Emergency Contact(s)     Name Relation Home Work Mobile    Filemon [employer] [place]  [phone]        Documents on File        Status Date Received Description       Documents for the Patient    Privacy Notice - [place] Received 11     Insurance Card Received 17     External Medication Information Consent       Patient ID Scan Refused 17     Consent for Services - Hospital/Clinic Received () 12     Consent for EHR Access Received 14     [facility] Specified Other       Consent for Services - Hospital/Clinic Received () 14     Advance Directives and Living Will Received 14 POLST 10/10/2014    Consent for Services/Privacy Notice - Hospital/Clinic Received 10/28/16     Advance Directives and Living Will Received 17 LEGAL [employer] HE[employer]CARE 2015       Documents for the Encounter    CMS IM for Patient Signature       Observation Notice Received 17 RAFAEL PETTY    CMS IM for Patient Signature Received 17 spoke with Javier       Admission Information     Attending Provider Admitting Provider Admission Type Admission Date/Time    Hao Suárez MD Young, Jean Tsai, MD Emergency 17  0127    Discharge Date Hospital Service Auth/Cert Status Service Area     Hospitalist [facility]    Unit Room/Bed Admission Status        3 MEDICAL SURGICAL  Admission (Confirmed)       Admission     Complaint    Bradycardia, Renal failure      Hospital Account     Name Acct ID Class Status Primary Coverage    Edil Lopez [id number] Inpatient Open  DREAD - DREAD FOR SENIORS [facility]/NON [facility] PARTNERS            Guarantor Account (for Hospital Account #[id number])     Name Relation to Pt Service Area Active? Acct Type    Edil Lopez Self FCS Yes Personal/Family    Address Phone          [facility]  [address]  [place] [address] [phone](H)  None(O)              Coverage Information (for Hospital Account #[id number])     F/O Payor/Plan Precert #    DREAD/DREAD FOR SENIORS [facility]/NON [facility] PARTNERS     Subscriber Subscriber #    Edil Lopez [id number]    Address Phone    [address]  [place] [address] [phone]

## 2017-05-22 NOTE — IP AVS SNAPSHOT
MRN:[id number]                      After Visit Summary   5/22/2017    Edil Lopez    MRN: [id number]           Thank you!     Thank you for choosing [facility] for your care. Our goal is always to provide you with excellent care. Hearing back from our patients is one way we can continue to improve our services. Please take a few minutes to complete the written survey that you may receive in the mail after you visit. If you would like to speak to someone directly about your visit please contact Patient Relations at [phone]. Thank you!          Patient Information     Date Of Birth          7/21/1931        Designated Caregiver       Most Recent Value    Caregiver    Will someone help with your care after discharge? no [comes from a care facility]      About your hospital stay     You were admitted on:  May 22, 2017 You last received care in the:  [facility] [facility] [address] [facility]    You were discharged on:  June 1, 2017        Reason for your hospital stay       Tachybrady syndrome                  Who to Call     For medical emergencies, please call 911.  For non-urgent questions about your medical care, please call your primary care provider or clinic, [phone]          Attending Provider     Provider Specialty    Kyle Orona MD Emergency Medicine    Michela Ramon DO Internal Medicine    Hao Suárez MD Internal Medicine       Primary Care Provider Office Phone # Fax #    Shirley Toscano [phone]      After Care Instructions     Activity - Up ad austyn           Advance Diet as Tolerated       Follow this diet upon discharge: Orders Placed This Encounter      Room Service      Snacks/Supplements Adult: Magic Cup; With Meals      Combination Diet Dysphagia Diet Level 1: Pureed; Nectar Thickened Liquids (pre-thickened or use instant food thickener)            General info for SNF       Length of Stay Estimate: Long Term Care  Condition  at Discharge: Stable  Level of care:board and care  Rehabilitation Potential: Fair  Admission H&P remains valid and up-to-date: Yes  Recent Chemotherapy: N/A  Use Nursing Home Standing Orders: Yes            Mantoux instructions       Give two-step Mantoux (PPD) Per Facility Policy Yes                  Follow-up Appointments     Follow Up and recommended labs and tests       Follow up with Nursing home physician.  No follow up labs or test are needed.  Follow up with primary care provider and hospice team in few days            Follow-up and recommended labs and tests        Follow up with Dr. Hopson at [facility] in 1 month.                  Future tests that were ordered for you     Aqua K pad and pump       Heating therapy to L antecubital and or R wrist IV site prn pain                  Further instructions from your care team       Please have [facility] reassess if pt appropriate to admit and discuss with Filemon after dc.     May apply heat therapy to L antecubital and R wrist painful IV sites prn.  See also order for voltaren gel topically to same areas.     Pending Results     No orders found from 5/20/2017 to 5/23/2017.            Statement of Approval     Ordered          06/01/17 1017  I have reviewed and agree with all the recommendations and orders detailed in this document.  EFFECTIVE NOW     Approved and electronically signed by:  Stanislaw Franco MD             Admission Information     Date & Time Provider Department Dept. Phone    5/22/2017 Hao Suárez MD [place] [address] [facility] [phone]      Your Vitals Were     Blood Pressure Pulse Temperature Respirations Weight Pulse Oximetry    170/83 (BP Location: Right arm) 94 97.1  F (36.2  C) (Oral) 20 76.3 kg (168 lb 4.8 oz) 97%    BMI (Body Mass Index)                   27.16 kg/m2           MyChart Information     [employer] lets you send messages to your doctor, view your test results, renew your prescriptions, schedule  "appointments and more. To sign up, go to [email or web address] . Click on \"Log in\" on the left side of the screen, which will take you to the Welcome page. Then click on \"Sign up Now\" on the right side of the page.     You will be asked to enter the access code listed below, as well as some personal information. Please follow the directions to create your username and password.     Your access code is: [email or web address]  Expires: 2017  5:08 PM     Your access code will  in 90 days. If you need help or a new code, please call your [place] clinic or [phone].        Care EveryWhere ID     This is your Care EveryWhere ID. This could be used by other organizations to access your [place] medical records  [id number]           Review of your medicines      START taking        Dose / Directions    amiodarone 200 MG tablet   Commonly known as:  PACERONE/CODARONE        Dose:  200 mg   Take 1 tablet (200 mg) by mouth daily   Quantity:  30 tablet   Refills:  0       amLODIPine 10 MG tablet   Commonly known as:  NORVASC        Dose:  10 mg   Take 1 tablet (10 mg) by mouth daily   Quantity:  60 tablet   Refills:  0       bisacodyl 10 MG Suppository   Commonly known as:  DULCOLAX   Used for:  Other chronic pain        Dose:  10 mg   Place 1 suppository (10 mg) rectally daily as needed for constipation   Quantity:  30 suppository   Refills:  0       calcium carbonate 500 MG chewable tablet   Commonly known as:  TUMS   Used for:  Gastroesophageal reflux disease, esophagitis presence not specified        Dose:  1 chew tab   Take 1 tablet (500 mg) by mouth 2 times daily as needed for heartburn   Refills:  0       diclofenac 1 % Gel topical gel   Commonly known as:  VOLTAREN   Used for:  Other chronic pain        Apply  2 grams to R antecubital and L wrist old IV sites four times daily using enclosed dosing card.   Quantity:  100 g   Refills:  0       hydrALAZINE 25 MG tablet   Commonly known as:  APRESOLINE   Used " for:  Other chronic pain        Dose:  25 mg   Take 1 tablet (25 mg) by mouth every 8 hours   Quantity:  90 tablet   Refills:  0       pantoprazole Susp suspension   Commonly known as:  PROTONIX   Used for:  Gastroesophageal reflux disease, esophagitis presence not specified        Dose:  40 mg   Take 20 mLs (40 mg) by mouth every morning   Refills:  0         CONTINUE these medicines which may have CHANGED, or have new prescriptions. If we are uncertain of the size of tablets/capsules you have at home, strength may be listed as something that might have changed.        Dose / Directions    acetaminophen 500 MG tablet   Commonly known as:  TYLENOL   This may have changed:    - medication strength  - additional instructions   Used for:  Other chronic pain        Dose:  1000 mg   Take 2 tablets (1,000 mg) by mouth 3 times daily 0800, 1400, 2200   Quantity:  1 Bottle   Refills:  0       * LORazepam 2 MG/ML (HIGH CONC) solution   Commonly known as:  LORazepam INTENSOL   This may have changed:    - how much to take  - additional instructions   Used for:  Agitation        Dose:  0.25-0.5 mg   Take 0.13-0.25 mLs (0.26-0.5 mg) by mouth 2 times daily See also prn dosing   Quantity:  30 mL   Refills:  0       * LORazepam 2 MG/ML (HIGH CONC) solution   Commonly known as:  ATIVAN   This may have changed:    - how much to take  - when to take this  - reasons to take this  - additional instructions   Used for:  Agitation        Dose:  0.25 mg   Take 0.13 mLs (0.26 mg) by mouth every 8 hours as needed See also scheduled doses. Hold for sedation.   Refills:  0       oxyCODONE 5 MG IR tablet   Commonly known as:  ROXICODONE   This may have changed:    - medication strength  - how much to take  - additional instructions  - Another medication with the same name was removed. Continue taking this medication, and follow the directions you see here.   Used for:  Other chronic pain        Dose:  2.5 mg   Take 0.5 tablets (2.5 mg) by mouth  4 times daily Hold for sedation   Quantity:  5 tablet   Refills:  0       polyethylene glycol Packet   Commonly known as:  MIRALAX/GLYCOLAX   This may have changed:  additional instructions   Used for:  Drug-induced constipation        Dose:  17 g   Take 17 g by mouth daily Hold for loose stools   Quantity:  7 packet   Refills:  0       senna-docusate 8.6-50 MG per tablet   Commonly known as:  SENOKOT-S;PERICOLACE   This may have changed:  additional instructions   Used for:  Drug-induced constipation        Dose:  2 tablet   Take 2 tablets by mouth daily Hold for loose stools   Refills:  0       * Notice:  This list has 2 medication(s) that are the same as other medications prescribed for you. Read the directions carefully, and ask your doctor or other care provider to review them with you.      CONTINUE these medicines which have NOT CHANGED        Dose / Directions    magnesium hydroxide 400 MG/5ML suspension   Commonly known as:  MILK OF MAGNESIA   Used for:  Drug-induced constipation        Dose:  30 mL   Take 30 mLs by mouth daily as needed for constipation or heartburn   Quantity:  105 mL   Refills:  0         STOP taking     METOPROLOL SUCCINATE ER PO           RANITIDINE HCL PO           sennosides 8.6 MG tablet   Commonly known as:  SENOKOT                Where to get your medicines      Some of these will need a paper prescription and others can be bought over the counter. Ask your nurse if you have questions.     Bring a paper prescription for each of these medications     LORazepam 2 MG/ML (HIGH CONC) solution    oxyCODONE 5 MG IR tablet       You don't need a prescription for these medications     acetaminophen 500 MG tablet    amiodarone 200 MG tablet    amLODIPine 10 MG tablet    bisacodyl 10 MG Suppository    calcium carbonate 500 MG chewable tablet    diclofenac 1 % Gel topical gel    hydrALAZINE 25 MG tablet    magnesium hydroxide 400 MG/5ML suspension    pantoprazole Susp suspension     polyethylene glycol Packet    senna-docusate 8.6-50 MG per tablet         Information about where to get these medications is not yet available     ! Ask your nurse or doctor about these medications     LORazepam 2 MG/ML (HIGH CONC) solution                Protect others around you: Learn how to safely use, store and throw away your medicines at www.disposemymeds.org.             Medication List: This is a list of all your medications and when to take them. Check marks below indicate your daily home schedule. Keep this list as a reference.      Medications           Morning Afternoon Evening Bedtime As Needed    acetaminophen 500 MG tablet   Commonly known as:  TYLENOL   Take 2 tablets (1,000 mg) by mouth 3 times daily 0800, 1400, 2200   Last time this was given:  1,000 mg on 5/30/2017  2:17 PM                                amiodarone 200 MG tablet   Commonly known as:  PACERONE/CODARONE   Take 1 tablet (200 mg) by mouth daily   Last time this was given:  400 mg on 6/1/2017  8:20 AM                                amLODIPine 10 MG tablet   Commonly known as:  NORVASC   Take 1 tablet (10 mg) by mouth daily   Last time this was given:  10 mg on 6/1/2017  8:19 AM                                bisacodyl 10 MG Suppository   Commonly known as:  DULCOLAX   Place 1 suppository (10 mg) rectally daily as needed for constipation   Last time this was given:  10 mg on 5/30/2017  8:44 PM                                calcium carbonate 500 MG chewable tablet   Commonly known as:  TUMS   Take 1 tablet (500 mg) by mouth 2 times daily as needed for heartburn   Last time this was given:  1,000 mg on 5/27/2017  5:27 PM                                diclofenac 1 % Gel topical gel   Commonly known as:  VOLTAREN   Apply  2 grams to R antecubital and L wrist old IV sites four times daily using enclosed dosing card.                                hydrALAZINE 25 MG tablet   Commonly known as:  APRESOLINE   Take 1 tablet (25 mg) by mouth  every 8 hours   Last time this was given:  25 mg on 5/31/2017 10:20 PM                                * LORazepam 2 MG/ML (HIGH CONC) solution   Commonly known as:  LORazepam INTENSOL   Take 0.13-0.25 mLs (0.26-0.5 mg) by mouth 2 times daily See also prn dosing   Last time this was given:  0.5 mg on 6/1/2017  8:23 AM                                * LORazepam 2 MG/ML (HIGH CONC) solution   Commonly known as:  ATIVAN   Take 0.13 mLs (0.26 mg) by mouth every 8 hours as needed See also scheduled doses. Hold for sedation.   Last time this was given:  0.5 mg on 6/1/2017  8:23 AM                                magnesium hydroxide 400 MG/5ML suspension   Commonly known as:  MILK OF MAGNESIA   Take 30 mLs by mouth daily as needed for constipation or heartburn                                oxyCODONE 5 MG IR tablet   Commonly known as:  ROXICODONE   Take 0.5 tablets (2.5 mg) by mouth 4 times daily Hold for sedation   Last time this was given:  2.5 mg on 6/1/2017 12:53 PM                                pantoprazole Susp suspension   Commonly known as:  PROTONIX   Take 20 mLs (40 mg) by mouth every morning   Last time this was given:  20 mg on 6/1/2017 12:49 PM                                polyethylene glycol Packet   Commonly known as:  MIRALAX/GLYCOLAX   Take 17 g by mouth daily Hold for loose stools   Last time this was given:  17 g on 6/1/2017  8:19 AM                                senna-docusate 8.6-50 MG per tablet   Commonly known as:  SENOKOT-S;PERICOLACE   Take 2 tablets by mouth daily Hold for loose stools   Last time this was given:  1 tablet on 6/1/2017  8:20 AM                                * Notice:  This list has 2 medication(s) that are the same as other medications prescribed for you. Read the directions carefully, and ask your doctor or other care provider to review them with you.

## 2017-05-22 NOTE — PROGRESS NOTES
Lactic Acid 3.9, PA notified, though waiting on urology to place in rutledge catheter in order to give boluses.

## 2017-05-22 NOTE — IP AVS SNAPSHOT
` `     [place] [address] MEDICAL SURGICAL: [phone]            Medication Administration Report for Edil Lopez as of 06/01/17 1258   Legend:    Given Hold Not Given Due Canceled Entry Other Actions    Time Time (Time) Time  Time-Action       Inactive    Active    Linked        Medications 05/26/17 05/27/17 05/28/17 05/29/17 05/30/17 05/31/17 06/01/17    acetaminophen (TYLENOL) solution 1,000 mg  Dose: 1,000 mg Freq: 3 TIMES DAILY Route: PO  Start: 05/30/17 1600   Admin Instructions: Maximum acetaminophen dose from all sources= 75 mg/kg/day not to exceed 4 grams/day.         1741 (975 mg)-Given       (2236)-Not Given [C]        0855 (1,000 mg)-Given       1608 (1,000 mg)-Given       2219 (1,000 mg)-Given        0819 (1,000 mg)-Given       [ ] 1600       [ ] 2200           amiodarone (PACERONE/CODARONE) tablet 400 mg  Dose: 400 mg Freq: DAILY Route: PO  Start: 06/01/17 0900   End: 06/05/17 0859   Admin Instructions: 400 mg daily for one week, then 200 mg daily thereafter.  Avoid grapefruit juice during oral amiodarone treatment.           0820 (400 mg)-Given           amLODIPine (NORVASC) tablet 10 mg  Dose: 10 mg Freq: DAILY Route: PO  Start: 05/25/17 1545   Admin Instructions: Hold for SBP < 100     0851 (10 mg)-Given        0827 (10 mg)-Given        0841 (10 mg)-Given        0952 (10 mg)-Given        0827 (10 mg)-Given        0855 (10 mg)-Given        0819 (10 mg)-Given           bisacodyl (DULCOLAX) Suppository 10 mg  Dose: 10 mg Freq: DAILY PRN Route: RE  PRN Reason: constipation  Start: 05/24/17 0815        2044 (10 mg)-Given             calcium carbonate (TUMS) chewable tablet 1,000 mg  Dose: 1,000 mg Freq: 2 TIMES DAILY PRN Route: PO  PRN Reason: heartburn  Start: 05/24/17 0814     1727 (1,000 mg)-Given                hydrALAZINE (APRESOLINE) tablet 25 mg  Dose: 25 mg Freq: EVERY 8 HOURS SCHEDULED Route: PO  Start: 05/27/17 1400     (1415)-Not Given [C]       2205 (25 mg)-Given        0519 (25  mg)-Given       1416 (25 mg)-Given       2201 (25 mg)-Given        0608 (25 mg)-Given       1354 (25 mg)-Given       2134 (25 mg)-Given        0542 (25 mg)-Given       1417 (25 mg)-Given       2210 (25 mg)-Given        0618 (25 mg)-Given       1334 (25 mg)-Given       2220 (25 mg)-Given        0620-Hold [C]       [ ] 1400       [ ] 2200           HYDROmorphone (DILAUDID) injection 0.2 mg  Dose: 0.2 mg Freq: EVERY 3 HOURS PRN Route: IV  PRN Reason: moderate to severe pain  Start: 05/25/17 1227     0628 (0.2 mg)-Given        0246 (0.2 mg)-Given        0026 (0.2 mg)-Given       0343 (0.2 mg)-Given              isosorbide dinitrate (ISORDIL) tablet 10 mg  Dose: 10 mg Freq: 3 TIMES DAILY Route: PO  Start: 05/27/17 1000   Admin Instructions: Recommended to take on empty stomach.      1005 (10 mg)-Given       1719 (10 mg)-Given       2206 (10 mg)-Given        0840 (10 mg)-Given       1634 (10 mg)-Given       2201 (10 mg)-Given        0943 (10 mg)-Given       1611 (10 mg)-Given       2134 (10 mg)-Given        0827 (10 mg)-Given       1743 (10 mg)-Given       2210 (10 mg)-Given        0855 (10 mg)-Given       1608 (10 mg)-Given       2220 (10 mg)-Given        0819 (10 mg)-Given       [ ] 1600       [ ] 2200           LORazepam (ATIVAN) 2 MG/ML (HIGH CONC) solution 0.26 mg  Dose: 0.26 mg Freq: EVERY 8 HOURS PRN Route: PO  PRN Reason: agitation  Start: 05/26/17 1303   Admin Instructions: Hold for sedation.      0133 (0.26 mg)-Given        0052 (0.26 mg)-Given        0432 (0.26 mg)-Given        0126 (0.26 mg)-Given             LORazepam (ATIVAN) 2 MG/ML (HIGH CONC) solution 0.26-0.5 mg  Dose: 0.26-0.5 mg Freq: 2 TIMES DAILY Route: PO  Start: 05/26/17 2000   Admin Instructions: Hold for sedation.     2107 (0.26 mg)-Given        0849 (0.26 mg)-Given       1925 (0.5 mg)-Given        0841 (0.5 mg)-Given       1931 (0.5 mg)-Given        0943 (0.5 mg)-Given       1652 (0.26 mg)-Given       2228 (0.26 mg)-Given        0825 (0.5  mg)-Given       2044 (0.5 mg)-Given        0856 (0.5 mg)-Given       2048 (0.5 mg)-Given        0823 (0.5 mg)-Given       [ ] 2000           naloxone (NARCAN) injection 0.1-0.4 mg  Dose: 0.1-0.4 mg Freq: EVERY 2 MIN PRN Route: IV  PRN Reason: opioid reversal  Start: 05/22/17 1210   Admin Instructions: For respiratory rate LESS than or EQUAL to 8.  Partial reversal dose:  0.1 mg titrated q 2 minutes for Analgesia Side Effects Monitoring Sedation Level of 3 (frequently drowsy, arousable, drifts to sleep during conversation).Full reversal dose:  0.4 mg bolus for Analgesia Side Effects Monitoring Sedation Level of 4 (somnolent, minimal or no response to stimulation).               oxyCODONE (ROXICODONE) IR half-tab 2.5 mg  Dose: 2.5 mg Freq: 4 TIMES DAILY Route: PO  Start: 05/26/17 1600   Admin Instructions: Hold for sedation     (1650)-Not Given       2106 (2.5 mg)-Given        0827 (2.5 mg)-Given       1202 (2.5 mg)-Given       (1714)-Not Given       1923 (2.5 mg)-Given        0840 (2.5 mg)-Given       1209 (2.5 mg)-Given       1633 (2.5 mg)-Given       (1921)-Not Given       2020 (2.5 mg)-Given        0951 (2.5 mg)-Given       1347 (2.5 mg)-Given       1611 (2.5 mg)-Given       2046 (2.5 mg)-Given        0827 (2.5 mg)-Given       1212 (2.5 mg)-Given       1743 (2.5 mg)-Given       (2050)-Not Given        (0907)-Not Given [C]       1116 (2.5 mg)-Given       1607 (2.5 mg)-Given       2047 (2.5 mg)-Given        0820 (2.5 mg)-Given       1253 (2.5 mg)-Given       [ ] 1600       [ ] 2000           pantoprazole (PROTONIX) suspension 40 mg  Dose: 40 mg Freq: EVERY MORNING Route: PO  Start: 05/31/17 0900   Admin Instructions: DO NOT CRUSH.          0855 (40 mg)-Given        1249 (20 mg)-Given [C]           polyethylene glycol (MIRALAX/GLYCOLAX) Packet 17 g  Dose: 17 g Freq: DAILY PRN Route: PO  PRN Comment: constipation  Start: 05/24/17 0815   Admin Instructions: 1 Packet = 17 grams. Mixed prescribed dose in 8 ounces of  water. Follow with 8 oz. of water.               polyethylene glycol (MIRALAX/GLYCOLAX) Packet 17 g  Dose: 17 g Freq: DAILY Route: PO  Start: 05/22/17 1051   Admin Instructions: 1 Packet = 17 grams. Mixed prescribed dose in 8 ounces of water. Follow with 8 oz. of water.     (0851)-Not Given        0828 (17 g)-Given        0840 (17 g)-Given        (0924)-Not Given [C]        0826 (17 g)-Given        0855 (17 g)-Given        0819 (17 g)-Given           potassium chloride (KLOR-CON) Packet 20-40 mEq  Dose: 20-40 mEq Freq: EVERY 2 HOURS PRN Route: ORAL OR FEED  PRN Reason: potassium supplementation  Start: 05/27/17 0837   Admin Instructions: Use if unable to tolerate tablets.  If Serum K+ 3.0-3.3, dose = 60 mEq po total dose (40 mEq x1 followed in 2 hours by 20 mEq x1). Recheck K+ level 4 hours after dose and the next AM.  If Serum K+ 2.5-2.9, dose = 80 mEq po total dose (40 mEq Q2H x2). Recheck K+ level 4 hours after dose and the next AM.  If Serum K+ less than 2.5, See IV order.  Dissolve packet contents in 4-8 ounces of cold water or juice.               potassium chloride 10 mEq in 100 mL intermittent infusion  Dose: 10 mEq Freq: EVERY 1 HOUR PRN Route: IV  PRN Reason: potassium supplementation  Start: 05/27/17 0837   Admin Instructions: Infuse via PERIPHERAL LINE or CENTRAL LINE. Use for central line replacement if patient weight less than 65 kg, if patient is on TPN with high potassium content or if unit does not stock 20 mEq bags.   If Serum K+ 3.0-3.3, dose = 10 mEq/hr x4 doses (40 mEq IV total dose). Recheck K+ level 2 hours after dose and the next AM.   If Serum K+ less than 3.0, dose = 10 mEq/hr x6 doses (60 mEq IV total dose). Recheck K+ level 2 hours after dose and the next AM.               potassium chloride 10 mEq in 100 mL intermittent infusion with 10 mg lidocaine  Dose: 10 mEq Freq: EVERY 1 HOUR PRN Route: IV  PRN Reason: potassium supplementation  Start: 05/27/17 0837   Admin Instructions: Infuse via  PERIPHERAL LINE. Use potassium with lidocaine for pain with peripheral administration.  If Serum K+ 3.0-3.3, dose = 10 mEq/hr x4 doses (40 mEq IV total dose). Recheck K+ level 2 hours after dose and the next AM.  If Serum K+ less than 3.0, dose = 10 mEq/hr x6 doses (60 mEq IV total dose). Recheck K+ level 2 hours after dose and the next AM.               potassium chloride 20 mEq in 50 mL intermittent infusion  Dose: 20 mEq Freq: EVERY 1 HOUR PRN Route: IV  PRN Reason: potassium supplementation  Start: 05/27/17 0837   Admin Instructions: Infuse via CENTRAL LINE Only. May need EKG if less than 65 kg or on TPN - Max rate is 0.3 mEq/kg/hr for patients not on EKG monitoring.   If Serum K+ 3.0-3.3, dose = 20 mEq/hr x2 doses (40 mEq IV total dose). Recheck K+ level 2 hours after dose and the next AM.  If Serum K+ less than 3.0, dose = 20 mEq/hr x3 doses (60 mEq IV total dose). Recheck K+ level 2 hours after dose and the next AM.               potassium chloride SA (K-DUR/KLOR-CON M) CR tablet 20-40 mEq  Dose: 20-40 mEq Freq: EVERY 2 HOURS PRN Route: PO  PRN Reason: potassium supplementation  Start: 05/27/17 0837   Admin Instructions: Use if able to take PO.   If Serum K+ 3.0-3.3, dose = 60 mEq po total dose (40 mEq x1 followed in 2 hours by 20 mEq x1). Recheck K+ level 4 hours after dose and the next AM.  If Serum K+ 2.5-2.9, dose = 80 mEq po total dose (40 mEq Q2H x2). Recheck K+ level 4 hours after dose and the next AM.  If Serum K+ less than 2.5, See IV order.  DO NOT CRUSH      (0922)-Not Given [C]       0924 (40 mEq)-Given       1120 (20 mEq)-Given                senna-docusate (SENOKOT-S;PERICOLACE) 8.6-50 MG per tablet 1-2 tablet  Dose: 1-2 tablet Freq: 2 TIMES DAILY Route: PO  Start: 05/22/17 2000   Admin Instructions: Start with 1 tablet PO BID, If no bowel movement in 24 hours, increase to 2 tablets PO BID.  Hold for loose stools.     (8483)-Not Given       2106 (2 tablet)-Given        0827 (1 tablet)-Given        1923 (1 tablet)-Given        0840 (1 tablet)-Given       1928 (1 tablet)-Given        0943 (1 tablet)-Given       2046 (1 tablet)-Given        0827 (1 tablet)-Given       (2050)-Not Given [C]        (0843)-Not Given       (2052)-Not Given [C]        0820 (1 tablet)-Given       [ ] 2000           sodium chloride (PF) 0.9% PF flush 3 mL  Dose: 3 mL Freq: EVERY 1 HOUR PRN Route: IK  PRN Reasons: line flush,post meds or blood draw  Start: 05/26/17 2352              sodium chloride (PF) 0.9% PF flush 3 mL  Dose: 3 mL Freq: EVERY 8 HOURS Route: IK  Start: 05/27/17 0000     0104 (3 mL)-Given       0842 (3 mL)-Given       (1603)-Not Given       2345 (3 mL)-Given        0841 (3 mL)-Given       1632 (3 mL)-Given        (0019)-Not Given       0942 (3 mL)-Given       1611 (3 mL)-Given        (0125)-Not Given       1022 (3 mL)-Given       1812 (3 mL)-Given        0034 (3 mL)-Given       0906 (3 mL)-Given       1619 (3 mL)-Given               0823 (3 mL)-Given       [ ] 1600          Future Medications  Medications 05/26/17 05/27/17 05/28/17 05/29/17 05/30/17 05/31/17 06/01/17       amiodarone (PACERONE/CODARONE) tablet 200 mg  Dose: 200 mg Freq: DAILY Route: PO  Start: 06/05/17 0900   Admin Instructions: Avoid grapefruit juice during oral amiodarone treatment.              Discontinued Medications  Medications 05/26/17 05/27/17 05/28/17 05/29/17 05/30/17 05/31/17 06/01/17         Dose: 1,000 mg Freq: 3 TIMES DAILY Route: PO  Start: 05/22/17 1400   End: 05/30/17 1539   Admin Instructions: Maximum acetaminophen dose from all sources = 75 mg/kg/day not to exceed 4 gram     0851 (1,000 mg)-Given       1350 (1,000 mg)-Given       2106 (1,000 mg)-Given        0827 (1,000 mg)-Given       1412 (1,000 mg)-Given       1923 (1,000 mg)-Given        0840 (1,000 mg)-Given       1407 (1,000 mg)-Given       1928 (1,000 mg)-Given        0943 (1,000 mg)-Given       1354 (1,000 mg)-Given       2046 (1,000 mg)-Given        0826 (1,000  mg)-Given       1417 (1,000 mg)-Given       1539-Med Discontinued           Rate: 30 mL/hr Freq: CONTINUOUS Route: IV  Last Dose: Stopped (05/29/17 1349)  Start: 05/27/17 1425   End: 05/29/17 1336   Admin Instructions: After 24 hours of infusion, call MD for further orders (if MD not already contacted).  Hold for systolic BP less than 90 or HR less than 60.  FOR Telemetry patients ONLY.    Irritant. Administer through 0.22 micron filter      1431 (0.5 mg/min)-New Bag       1540 (0.5 mg/min)-Rate/Dose Verify       1617 (0.5 mg/min)-New Bag       2326 (0.5 mg/min)-New Bag        0827 (0.5 mg/min)-New Bag       1806 (0.5 mg/min)-New Bag        0125 (0.5 mg/min)-New Bag       0900 (0.5 mg/min)-New Bag [C]       1336-Med Discontinued  1349-Stopped                Dose: 400 mg Freq: DAILY Route: PO  Start: 05/29/17 1345   End: 05/31/17 1257   Admin Instructions: 400 mg daily for one week, then 200 mg daily thereafter.  Avoid grapefruit juice during oral amiodarone treatment.        1354 (400 mg)-Given        0827 (400 mg)-Given        0855 (400 mg)-Given       1257-Med Discontinued          Dose: 1 g Freq: EVERY 24 HOURS Route: IV  Indications of Use: URINARY TRACT INFECTION  Last Dose: 1 g (05/25/17 2206)  Start: 05/24/17 2200   End: 05/30/17 1418    2106 (1 g)-New Bag        2305 (1 g)-New Bag        2124 (1 g)-New Bag        2126 (1 g)-New Bag        1418-Med Discontinued           Dose: 40 mg Freq: EVERY MORNING Route: PO  Start: 05/24/17 0900   End: 05/30/17 1540   Admin Instructions: DO NOT CRUSH.     0851 (40 mg)-Given        0827 (40 mg)-Given        0841 (40 mg)-Given        0943 (40 mg)-Given        0826 (40 mg)-Given       1540-Med Discontinued      Medications 05/26/17 05/27/17 05/28/17 05/29/17 05/30/17 05/31/17 06/01/17

## 2017-05-22 NOTE — IP AVS SNAPSHOT
` `     [facility]: [phone]                 INTERAGENCY TRANSFER FORM - NOTES (H&P, Discharge Summary, Consults, Procedures, Therapies)   2017                    Hospital Admission Date: 2017  KEELY LOPEZ   : 1931  Sex: Male        Patient PCP Information     Provider PCP Type    Shirley Toscano MD General         History & Physicals      H&P by Kat Quiroga PA-C at 2017 11:06 AM     Author:  Kat Quiroga PA-C Service:  Hospitalist Author Type:  Physician Assistant    Filed:  2017 11:27 AM Date of Service:  2017 11:06 AM Creation Time:  2017 11:06 AM    Status:  Signed :  Kat Quiroga PA-C (Physician Assistant)    Cosigner:  Michela Ramon DO at 2017 12:23 PM             [facility]  Outpatient/Observation Unit  Admission History and Physical Examination      Chief Complaint   Patient presents with     Catheter Problem       HPI:    Keely Lopez is a 85 year old male with a history of BPH and urinary retention and dementia who presented to the ED following loss of rutledge catheter.    Hx was obtained by speaking with the Emergency Room physician, chart review and interview with the patient.     This is an 85 year-old man with previous chronic suprapubic catheter and then rutledge catheter for BPH and urinary retention admitted after rutledge catheter accidentally removed at his nursing home.  Attempts at replacement of rutledge at nursing home and in ED were unsuccessful.  Therefore he is admitted for urologic evaluation for catheter placement.  While in ER, he was also noted to have heart rate in 30s, seemingly asymptomatic with appropriate blood pressure.  Patient has severe dementia but denies chest pain, dyspnea, and light-headedness.  He admits to intermittent sharp lower abdominal pain.    History:       Allergies   Allergen Reactions     Lisinopril      Penicillins        Past Medical History:    Diagnosis Date     Anemia      BPH (benign prostatic hyperplasia)      Chronic kidney disease      CVA (cerebral infarction)      Dementia      Gastro-oesophageal reflux disease      Hyperparathyroidism (H)      Hypertension      Hypothyroidism      Neuromuscular disorder (H)      PVD (peripheral vascular disease) (H)        Past Surgical History:   Procedure Laterality Date     APPENDECTOMY OPEN       ENDARTERECTOMY CAROTID       HERNIA REPAIR       Suprapubic catheter placement         Unable to obtain family history due to dementia.     Social History     Social History     Marital status:      Spouse name: N/A     Number of children: N/A     Years of education: N/A     Occupational History     Not on file.     Social History Main Topics     Smoking status: Former Smoker     Smokeless tobacco: Not on file     Alcohol use No     Drug use: No     Sexual activity: No     Other Topics Concern     Not on file     Social History Narrative         No current facility-administered medications on file prior to encounter.   No current outpatient prescriptions on file prior to encounter.      ROS:    10 point ROS negative other than the symptoms noted above.      Exam:    Vitals:  B/P: 133/49, T: 97.6, P: 47, R: 16    Constitutional: healthy, alert and no distress  Head: Normocephalic. No masses, lesions, tenderness or abnormalities  Neck: Neck supple. No adenopathy.   ENT: ENT exam normal, no neck nodes or sinus tenderness  Cardiovascular: Regular rhythm, rate 40s at time of examination, no murmurs  Respiratory: negative, Percussion normal. Good diaphragmatic excursion. Lungs clear  Gastrointestinal: Abdomen soft, non-tender. BS normal. Nondistended, no pain to palpation.  : Hypospadias noted.  Former suprapubic catheter site is clean and dry without drainage.  Musculoskeletal: Normal muscle bulk  Skin: no suspicious lesions or rashes  Neurologic: Gait not assessed.    Psychiatric: Alert and oriented to person  "and \"Hospital\", not to time or situation.  Appropriate.    Data:    Results for orders placed or performed during the hospital encounter of 05/22/17   Chest  XR, 1 view PORTABLE    Narrative    XR CHEST PORT 1 VW 5/22/2017 8:46 AM    COMPARISON: 6/2/2011    HISTORY: Bradycardia      Impression    IMPRESSION: Mildly enlarged cardiac silhouette. No focal airspace  disease, pleural effusion or pneumothorax.    ELSA RIGGS   Abd/pelvis CT no contrast - Stone Protocol    Narrative    CT ABDOMEN AND PELVIS WITHOUT CONTRAST   5/22/2017 9:43 AM     HISTORY: Unable to urinate. Evaluate for stones.    TECHNIQUE: Volumetric helical sections were acquired from the lung  bases through the ischial tuberosities without IV contrast. Coronal  images were also reconstructed. Radiation dose for this scan was  reduced using automated exposure control, adjustment of the mA and/or  kV according to patient size, or iterative reconstruction technique.    COMPARISON: None.    FINDINGS: There is a large nonobstructing bladder stone posteriorly,  measuring 3.9 x 2.1 cm. A few smaller adjacent bladder stones are also  noted. Small amount of air within the urinary bladder may be related  to recent instrumentation, although a colovesical fistula cannot be  excluded. There is mild prostatic enlargement and central  calcification. Nonobstructing 1.2 cm stone in the lower pole of the  right kidney. Cyst in the lower pole of the left kidney measures 2.2  cm. No hydronephrosis.    No bowel obstruction. Colonic diverticulosis. Mild fat stranding  adjacent to the proximal sigmoid colon could represent acute  diverticulitis. No associated fluid collections are identified to  suggest abscess. There is a small area of extraluminal air extending  from the rectosigmoid region along the superior aspect of the prostate  gland (series 3 image 90; series 2 image 70). No free fluid in the  pelvis. Moderate atherosclerotic aortoiliac calcification. Ectasia " of  the infrarenal abdominal aorta measures up to 2.8 cm AP. The liver,  gallbladder, spleen, adrenal glands, and pancreas have unremarkable  noncontrast appearances. Cardiac enlargement. Coronary artery  calcification. Mild fibrotic changes about the periphery of both lung  bases. Indeterminate 0.8 cm pulmonary nodule at the left lung base  posteriorly (series 2 image 17). Degenerative changes are noted  throughout the visualized thoracolumbar spine and in both hips.      Impression    IMPRESSION:   1. Large nonobstructing bladder stone measures 3.9 cm, with a few  smaller bladder stones also noted.  2. Nonobstructing 1.2 cm stone in the lower pole of the right kidney.  3. Mild bowel wall thickening and fat stranding involving the proximal  sigmoid colon could represent a mild diverticulitis. Please clinically  correlate.  4. Small amount of extraluminal air extends anteriorly from the  rectosigmoid region along the anterior aspect of the prostate gland.  There is also a small amount of air within the urinary bladder. A  colovesical fistula in this location cannot be excluded. Clinical  correlation is again requested.  5. Indeterminate 0.8 cm pulmonary nodule at the left lung base  posteriorly.    MARGARITO LYONS MD   Basic metabolic panel   Result Value Ref Range    Sodium 142 133 - 144 mmol/L    Potassium 4.2 3.4 - 5.3 mmol/L    Chloride 112 (H) 94 - 109 mmol/L    Carbon Dioxide 21 20 - 32 mmol/L    Anion Gap 9 3 - 14 mmol/L    Glucose 142 (H) 70 - 99 mg/dL    Urea Nitrogen 57 (H) 7 - 30 mg/dL    Creatinine 1.81 (H) 0.66 - 1.25 mg/dL    GFR Estimate 36 (L) >60 mL/min/1.7m2    GFR Estimate If Black 43 (L) >60 mL/min/1.7m2    Calcium 10.9 (H) 8.5 - 10.1 mg/dL   CBC (platelets, no diff)   Result Value Ref Range    WBC 13.2 (H) 4.0 - 11.0 10e9/L    RBC Count 3.32 (L) 4.4 - 5.9 10e12/L    Hemoglobin 9.7 (L) 13.3 - 17.7 g/dL    Hematocrit 31.4 (L) 40.0 - 53.0 %    MCV 95 78 - 100 fl    MCH 29.2 26.5 - 33.0 pg    MCHC  30.9 (L) 31.5 - 36.5 g/dL    RDW 14.4 10.0 - 15.0 %    Platelet Count 308 150 - 450 10e9/L   Troponin I   Result Value Ref Range    Troponin I ES 0.044 0.000 - 0.045 ug/L        EKG Interpretation:  Interpreted by Kat Quiroga  Symptoms at time of EKG: None   Rhythm: Normal sinus  and Junctional  Rate: 30-40  Axis: Normal    Clinical Impression: Likely  Junctional bradycardia    Assessment/Plan:  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  Ortiz dislodged, unsuccessful attempts at replacement at NH and in ED.      PLAN:  1. Consult to urology for management  2. Continue bladder scans to monitor for retention  3. NPO until evaluation by urology in case need OR    Acute renal failure, possibly obstructive, on chronic kidney disease stage 3  --baseline creatinine 1.2-1.3, current creatinine 1.8.  Possibly obstructive given BPH.  Urology to evaluate as above.  Also continue IVF and repeat BMP in AM.    Bradycardia  Hypertension  Reported history of CVA and CAD  --likely junctional bradycardia.  Chronically on beta-blocker presumably with CAD history though details of CAD unclear.  Hold Toprol XL and monitor on telemetry.    Chronic pain: continue PTA oxycodone.  Senna and miralax while on narcotics for bowel prophylaxis.    Dementia  --continue PTA lorazepam.  Not best long-term med in elderly but will not discontinue abruptly to avoid withdrawal    VTE prophylaxis: not indicated, observation status  Code: DNR/DNI per advanced directive  Disposition: pending successful replacement of catheter and improvement in bradycardia, return to [facility]  Updated patient's listed guardian Fly Calvert at [phone]    Signed:  Kat Quiroga PA-C  May 22, 2017 at 11:06 AM[MO1.1]         Revision History        User Key Date/Time User  Provider Type Action    > MO1.1 5/22/2017 11:27 AM Kat Quiroga PA-C Physician Assistant Sign                     Discharge Summaries      Discharge Summaries by Stanislaw Franco MD at 6/1/2017  1:27 AM     Author:  Stanislaw Franco MD Service:  Hospitalist Author Type:  Physician    Filed:  6/1/2017 12:49 PM Date of Service:  6/1/2017  1:27 AM Creation Time:  6/1/2017 12:41 PM    Status:  Addendum :  Stanislaw Franco MD (Physician)         Physician Discharge Summary     Name: Edil Lopez    MRN: [id number]     YOB: 1931    Age: 85 year old                                                 Primary care provider: Shirley Toscano      Admit date:  5/22/2017      Discharge date and time: 6/1/2017       Discharge Physician:  Stanislaw Franco        Discharge Diagnosis:       #1.  Tachybradycardia syndrome seen by cardiologist admission was treated with amiodarone drip which was changed to oral amiodarone therapy.    #2.  Urinary tract infection with Escherichia coli and treated and completed treatment here in the hospital    #3.  Dysphagia    #4.  Urinary retention    #5.  Electrolyte abnormalities including hyponatremia and hypokalemia    #6.  Dementia, chronic pain syndrome, dysphagia, requiring total care: Patient's guardian contacted and meeting was  held and the guardian recommended that patient will go on comfort measures and hospice consultation on his long-term facility.  Patient's medications were continued and can be given if patient is able to take it.  Overall goal is comfort measures focusing on quality of life.        Past Medical History and comorbid conditions:     Past Medical History:   Diagnosis Date     Anemia      BPH (benign prostatic hyperplasia)      Chronic kidney disease      CVA (cerebral infarction)      Dementia      Gastro-oesophageal reflux disease      Hyperparathyroidism (H)      Hypertension      Hypothyroidism      Neuromuscular disorder (H)      PVD  (peripheral vascular disease) (H)        Past Surgical History:  Past Surgical History:   Procedure Laterality Date     APPENDECTOMY OPEN       ENDARTERECTOMY CAROTID       HERNIA REPAIR       Suprapubic catheter placement                     Brief Summary of Hospital stay :       Patient is a 85-year-old male with multiple medical problems with baseline dementia who was admitted with problems with his Ortiz catheter placement.  Patient developed junctional bradycardia into the 30s and cardiologist consulted and patient was started with amiodarone drip and this was changed to oral amiodarone.  Patient has multiple issues including urinary tract infection dysphagia acute kidney injury and given the patient's poor prognosis family is contacted and patient's guardian decided the patient needs comfortable measures with focus on quality of life measures rather than prolonging life or restorative care.  On the day of discharge, patient is comfortable, alert and was  discharged to long-term care with anticipation of hospice care at the facility.  If patient's needs urgent care, discomfort addressed at emergency department level but since patient is that patient is not to be admitted to the hospital and requires hospice to follow.              Consultations during hospital stay       TLC         Major procedure performed/  Significant Diagnostic Studies[MA1.1]             Results for orders placed or performed during the hospital encounter of 05/22/17   Chest  XR, 1 view PORTABLE    Narrative    XR CHEST PORT 1 VW 5/22/2017 8:46 AM    COMPARISON: 6/2/2011    HISTORY: Bradycardia      Impression    IMPRESSION: Mildly enlarged cardiac silhouette. No focal airspace  disease, pleural effusion or pneumothorax.    ELSA RIGGS   Abd/pelvis CT no contrast - Stone Protocol    Narrative    CT ABDOMEN AND PELVIS WITHOUT CONTRAST   5/22/2017 9:43 AM     HISTORY: Unable to urinate. Evaluate for stones.    TECHNIQUE: Volumetric helical  sections were acquired from the lung  bases through the ischial tuberosities without IV contrast. Coronal  images were also reconstructed. Radiation dose for this scan was  reduced using automated exposure control, adjustment of the mA and/or  kV according to patient size, or iterative reconstruction technique.    COMPARISON: None.    FINDINGS: There is a large nonobstructing bladder stone posteriorly,  measuring 3.9 x 2.1 cm. A few smaller adjacent bladder stones are also  noted. Small amount of air within the urinary bladder may be related  to recent instrumentation, although a colovesical fistula cannot be  excluded. There is mild prostatic enlargement and central  calcification. Nonobstructing 1.2 cm stone in the lower pole of the  right kidney. Cyst in the lower pole of the left kidney measures 2.2  cm. No hydronephrosis.    No bowel obstruction. Colonic diverticulosis. Mild fat stranding  adjacent to the proximal sigmoid colon could represent acute  diverticulitis. No associated fluid collections are identified to  suggest abscess. There is a small area of extraluminal air extending  from the rectosigmoid region along the superior aspect of the prostate  gland (series 3 image 90; series 2 image 70). No free fluid in the  pelvis. Moderate atherosclerotic aortoiliac calcification. Ectasia of  the infrarenal abdominal aorta measures up to 2.8 cm AP. The liver,  gallbladder, spleen, adrenal glands, and pancreas have unremarkable  noncontrast appearances. Cardiac enlargement. Coronary artery  calcification. Mild fibrotic changes about the periphery of both lung  bases. Indeterminate 0.8 cm pulmonary nodule at the left lung base  posteriorly (series 2 image 17). Degenerative changes are noted  throughout the visualized thoracolumbar spine and in both hips.      Impression    IMPRESSION:   1. Large nonobstructing bladder stone measures 3.9 cm, with a few  smaller bladder stones also noted.  2. Nonobstructing 1.2 cm  stone in the lower pole of the right kidney.  3. Mild bowel wall thickening and fat stranding involving the proximal  sigmoid colon could represent a mild diverticulitis. Please clinically  correlate.  4. Small amount of extraluminal air extends anteriorly from the  rectosigmoid region along the anterior aspect of the prostate gland.  There is also a small amount of air within the urinary bladder. A  colovesical fistula in this location cannot be excluded. Clinical  correlation is again requested.  5. Indeterminate 0.8 cm pulmonary nodule at the left lung base  posteriorly.    MARGARITO LYONS MD[MA1.2]         Recent Labs  Lab 05/31/17 0618 05/26/17  0455   WBC 12.0* 12.0*   HGB 9.2*  --    HCT 29.7*  --    MCV 94  --      --      No results for input(s): CULT in the last 168 hours.    Recent Labs  Lab 05/31/17 0618 05/29/17  0654 05/28/17  0620 05/27/17  0656     --  146*  --  150*   POTASSIUM 4.1 4.2 3.5  < > 3.1*   CHLORIDE 112*  --  118*  --  123*   CO2 21  --  20  --  18*   ANIONGAP 8  --  8  --  9   GLC 89  --  117*  --  92   BUN 13  --  19  --  28   CR 0.96  --  0.88  --  0.99   GFRESTIMATED 74  --  82  --  72   GFRESTBLACK 89  --  >90African American GFR Calc  --  87   JENNA 9.9  --  9.5  --  9.8   < > = values in this interval not displayed.      Recent Labs  Lab 05/31/17 0618 05/28/17  0620 05/27/17  0656 05/26/17  0455   GLC 89 117* 92 112*           No results for input(s): INR in the last 168 hours.        No results for input(s): TROPONIN, TROPI, TROPR in the last 168 hours.    Invalid input(s): TROP, TROPONINIES              Pending Results           Unresulted Labs Ordered in the Past 30 Days of this Admission     No orders found from 3/23/2017 to 5/23/2017.              Disposition         long term care facility      Allergies       Allergies   Allergen Reactions     Lisinopril      Penicillins             Patient Instructions and Discharge Medications              Review of your  medicines      START taking       Dose / Directions    amiodarone 200 MG tablet   Commonly known as:  PACERONE/CODARONE        Dose:  200 mg   Take 1 tablet (200 mg) by mouth daily   Quantity:  30 tablet   Refills:  0       amLODIPine 10 MG tablet   Commonly known as:  NORVASC        Dose:  10 mg   Take 1 tablet (10 mg) by mouth daily   Quantity:  60 tablet   Refills:  0       bisacodyl 10 MG Suppository   Commonly known as:  DULCOLAX   Used for:  Other chronic pain        Dose:  10 mg   Place 1 suppository (10 mg) rectally daily as needed for constipation   Quantity:  30 suppository   Refills:  0       calcium carbonate 500 MG chewable tablet   Commonly known as:  TUMS   Used for:  Gastroesophageal reflux disease, esophagitis presence not specified        Dose:  1 chew tab   Take 1 tablet (500 mg) by mouth 2 times daily as needed for heartburn   Refills:  0       diclofenac 1 % Gel topical gel   Commonly known as:  VOLTAREN   Used for:  Other chronic pain        Apply  2 grams to R antecubital and L wrist old IV sites four times daily using enclosed dosing card.   Quantity:  100 g   Refills:  0       hydrALAZINE 25 MG tablet   Commonly known as:  APRESOLINE   Used for:  Other chronic pain        Dose:  25 mg   Take 1 tablet (25 mg) by mouth every 8 hours   Quantity:  90 tablet   Refills:  0       pantoprazole Susp suspension   Commonly known as:  PROTONIX   Used for:  Gastroesophageal reflux disease, esophagitis presence not specified        Dose:  40 mg   Take 20 mLs (40 mg) by mouth every morning   Refills:  0         CONTINUE these medicines which may have CHANGED, or have new prescriptions. If we are uncertain of the size of tablets/capsules you have at home, strength may be listed as something that might have changed.       Dose / Directions    acetaminophen 500 MG tablet   Commonly known as:  TYLENOL   This may have changed:    - medication strength  - additional instructions   Used for:  Other chronic pain         Dose:  1000 mg   Take 2 tablets (1,000 mg) by mouth 3 times daily 0800, 1400, 2200   Quantity:  1 Bottle   Refills:  0       * LORazepam 2 MG/ML (HIGH CONC) solution   Commonly known as:  LORazepam INTENSOL   This may have changed:    - how much to take  - additional instructions   Used for:  Agitation        Dose:  0.25-0.5 mg   Take 0.13-0.25 mLs (0.26-0.5 mg) by mouth 2 times daily See also prn dosing   Quantity:  30 mL   Refills:  0       * LORazepam 2 MG/ML (HIGH CONC) solution   Commonly known as:  ATIVAN   This may have changed:    - how much to take  - when to take this  - reasons to take this  - additional instructions   Used for:  Agitation        Dose:  0.25 mg   Take 0.13 mLs (0.26 mg) by mouth every 8 hours as needed See also scheduled doses. Hold for sedation.   Refills:  0       oxyCODONE 5 MG IR tablet   Commonly known as:  ROXICODONE   This may have changed:    - medication strength  - how much to take  - additional instructions  - Another medication with the same name was removed. Continue taking this medication, and follow the directions you see here.   Used for:  Other chronic pain        Dose:  2.5 mg   Take 0.5 tablets (2.5 mg) by mouth 4 times daily Hold for sedation   Quantity:  5 tablet   Refills:  0       polyethylene glycol Packet   Commonly known as:  MIRALAX/GLYCOLAX   This may have changed:  additional instructions   Used for:  Drug-induced constipation        Dose:  17 g   Take 17 g by mouth daily Hold for loose stools   Quantity:  7 packet   Refills:  0       senna-docusate 8.6-50 MG per tablet   Commonly known as:  SENOKOT-S;PERICOLACE   This may have changed:  additional instructions   Used for:  Drug-induced constipation        Dose:  2 tablet   Take 2 tablets by mouth daily Hold for loose stools   Refills:  0       * Notice:  This list has 2 medication(s) that are the same as other medications prescribed for you. Read the directions carefully, and ask your doctor or other  care provider to review them with you.      CONTINUE these medicines which have NOT CHANGED       Dose / Directions    magnesium hydroxide 400 MG/5ML suspension   Commonly known as:  MILK OF MAGNESIA   Used for:  Drug-induced constipation        Dose:  30 mL   Take 30 mLs by mouth daily as needed for constipation or heartburn   Quantity:  105 mL   Refills:  0         STOP taking          METOPROLOL SUCCINATE ER PO           RANITIDINE HCL PO           sennosides 8.6 MG tablet   Commonly known as:  SENOKOT                Where to get your medicines      Some of these will need a paper prescription and others can be bought over the counter. Ask your nurse if you have questions.     Bring a paper prescription for each of these medications      LORazepam 2 MG/ML (HIGH CONC) solution     oxyCODONE 5 MG IR tablet       You don't need a prescription for these medications      acetaminophen 500 MG tablet     amiodarone 200 MG tablet     amLODIPine 10 MG tablet     bisacodyl 10 MG Suppository     calcium carbonate 500 MG chewable tablet     diclofenac 1 % Gel topical gel     hydrALAZINE 25 MG tablet     magnesium hydroxide 400 MG/5ML suspension     pantoprazole Susp suspension     polyethylene glycol Packet     senna-docusate 8.6-50 MG per tablet         Information about where to get these medications is not yet available     ! Ask your nurse or doctor about these medications      LORazepam 2 MG/ML (HIGH CONC) solution              Discharge diet:  Active Diet Order      Combination Diet Dysphagia Diet Level 1: Pureed; Nectar Thickened Liquids (pre-thickened or use instant food thickener)      Advance Diet as Tolerated          Discharge activity:Activity as tolerated        Discharge follow-up:    Follow up with primary care provider in 7 days or earlier if symptoms return or gets worse.    Follow up with consultant as instructed     Other instructions:    We discussed with Patient/family about detail discharge  instructions as well as discharge medications above including potential risks,side effects and benefits.Patient/family understood benefits and potential serious side effects of taking these medications and need to follow up with PCP if the patient develops complications.  Patient is also advised to see a doctor immediately for severe symptoms.        I saw and evaluated the patient today and I also reviewed the discharge instructions and answered all the patient questions.Over 30 minutes spend on discharge and coordination of discharge process for this patient.          Disclaimer: This note consists of symbols derived from keyboarding, dictation and/or voice recognition software. As a result, there may be errors in the script that have gone undetected. Please consider this when interpreting information found in this lynnette[MA1.1]       Revision History        User Key Date/Time User Provider Type Action    > [N/A] 6/1/2017 12:49 PM Stanislaw Franco MD Physician Addend     MA1.2 6/1/2017 12:48 PM Stanislaw Franco MD Physician Sign     MA1.1 6/1/2017 12:41 PM Stanislaw Franco MD Physician                      Consult Notes      Consults by Oxana Garza APRN CNS at 5/25/2017 10:05 AM     Author:  Oxana Garza APRN CNS Service:  Palliative Author Type:  Clinical Nurse Specialist    Filed:  5/25/2017  9:42 PM Date of Service:  5/25/2017 10:05 AM Creation Time:  5/25/2017  9:26 AM    Status:  Signed :  Oxana Garza APRN CNS (Clinical Nurse Specialist)         [facility]    Palliative Care Consultation   Text Page    Date of Admission:  5/22/2017[JW1.1]    Assessment & Plan[JW1.2]   Edil Lopez is a 85 year old male who was admitted on 5/22/2017. I was asked to see the patient for[JW1.1] goals of care[JW1.3].    Recommendations:  1.[JW1.1] Pain[JW1.4] - tylenol 975mg q 8 hours. Hydromorphone 0.2mg IV q 3 hours prn. Oxycodone scheduled 5mg 4 times daily scheduled as from  PTA. Reposition for comfort.[JW1.5]  2.[JW1.1] Agitation[JW1.4] - Ativan 0.5mg BID as PTA. DC prn ativan. Seroquel 12.5-25 mg PO q 6 hours prn agitation. Promote food and fluids.[JW1.5] Appreciate nursing assistance to[JW1.3] keep pt awake during daytime.[JW1.5]    Goal of Care:[JW1.1] DNR/DNI.  MARY Rosen has been in contact with pt's care coordinator and requested legal documents regarding guardian.[JW1.4] Confirmed with hsahid Richardson, care conference with Jessika HERNANDEZ, Dr. Man and palliative care to meet at 11:30 on 5/26/17.[JW1.6] Requested copy of most recent POLST from  [facility].[JW1.3]    Disease Process/es & Symptoms:  Edil Lopez is a 85 year old patient admitted[JW1.1] for rutledge catheter replacement requiring urology consult to replace in ED. Pt was also noted[JW1.3] with symptoms of bradycardia. He has been treated for junctional rhythm, dysphagia, UTI, urinary retention, ARF with CKD and HTN.[JW1.1]   Pt has a hx of SP catheter for BPH and urinary retention, but had been converted to rutledge catheter. His catheter had fallen out approximately 2 weeks ago and was successfully replaced by NH staff, and was examined by Zelda BARRIGA from [facility] in [place].[JW1.3]     This is in the setting of[JW1.1] dementia, hx of CVA, s/p carotid endarterectomy, HTN, CKD, GERD, PVD, Hypothyroidism, Hyperparathyroidism. Guardian reports that pt has had a 9# weight loss over 6 month period. He had been assessed for HOSPICE appropriateness at NH, however did not meet criteria. Guardian believes pt is comfort care at [facility] per POLST.      H[JW1.3]e[JW1.1] has been hospitalized in the ED for rutledge catheter or SP catheter problems and UTI  2 times in the past 9 months. In[JW1.3] Pt's[JW1.1] clinical record, his[JW1.3] weight is[JW1.1] increased[JW1.3] 20# since 5/2016.    The following symptoms are noted[JW1.1] as[JW1.3] concerning to[JW1.1] pt[JW1.3] quality of life[JW1.1] - per nursing staff,  providers and guardian.  Pain in low abdomin  Agitation[JW1.3]    Psychosocial/Spiritual Needs:    Oriented[JW1.1] guardian[JW1.6] to Spiritual Health and Social Work Services as part of Palliative Care team.  is following.    Decision-Making & Goals of Care:  Discussion/counseling today about goals of care/decisions:   5/25/17 Per [facility], pt has a guardian.[JW1.1] [facility] has been attempting to obtain contact information and documents for much of the day.[JW1.3]  Was contacted by shahid Calvert at 3:30[JW1.6] pm[JW1.3] today.[JW1.6] Per guardian,[JW1.3] Pt and his wife were both residents of[JW1.6] [facility]. Wife passed away 6 months ago in Hospice. [facility] at [facility] had recommended hospice 9-10 months ago for pt, however, he did not meet criteria at that time.  Guardian states pt's baseline currently is that he is verbal, but forgetful and no short term memory. Pt is in bed when guardian sees him although [facility] staff state he gets up to a WC. Pt is incontinent of bowel. Has indwelling catheter. Requires help for cares and is fed. Shahid believes that pt is comfort care per his most recent POLST. Have requested a copy. Guardian works M-F, and only is available until 1pm on Friday. Have requested a care conference with Shahid, MARY, Dr. Man for 11:30am to review most recent POLST, pt condition, and establish POC. If pt now meets HOSPICE criteria, guardian reports that [facility] is the provider that would be selected.[JW1.3]    Patient has decision-making capacity Unreliable  Patient has a court-appointed guardian/conservator for healthcare decisions in a legal document . See name(s) and contact information in Health Care Agent/Legal Guardian section below.    Name: Filemon Calvert, Relationship: guardian, Phone(s):[JW1.1] [phone].[JW1.3]  MARY Rosen[JW1.1] is obtaining legal guardian documents.   Have also requested most recent POLST from [facility] per  Guardian.[JW1.3]      Patient has a completed health care directive available in the chart (Y/N): N  Physician orders for life-sustaining treatment (POLST) form is on file[JW1.1].[JW1.6]   Code Status: Do not resusitate / Do not intubate     Findings & plan of care discussed with: Dr. Hdez, Bedside RN. MARY Rosen[JW1.1], Natalie Colbert.[JW1.3]  Follow-up plan from palliative team: Will continue to follow this pt and guardian for symptom management and goals of care.  Thank you for involving us in the patient's care.[JW1.1]     Oxana Garza[JW1.2] APRN, CNS  Pain Management and Palliative Care  [facility]  Pgr: [phone][JW1.1]    Time Spent on this Encounter[JW1.2]   I spent[JW1.1] 60[JW1.3] minutes in assessment of the patient and discussion with the[JW1.1] pt's guardian Filemon[JW1.3]. Another 30 minutes in review of chart, documentation and discussion with the health care team.[JW1.1]  Phone conference 3:30-4:00, 4:15-4:30pm.[JW1.3]    Reason for Consult[JW1.2]   Reason for consult: I was asked by Dr. Man to evaluate this patient for Goals of care  Decisional support.[JW1.1]    Primary Care Physician   Shirley Toscano    Chief Complaint[JW1.2]    Accidental rutledge catheter removal and inability to replace  Bradycardia    History is obtained from the electronic health record, staff and pt's guardian.[JW1.1]    History of Present Illness[JW1.2]   Edil Lopez is a 85 year old male who presents after accidentally removing his[JW1.1] chronic indwelling[JW1.3] rutledge catheter at his nursing home.[JW1.1] The NH staff was unable to replace catheter. The pt required urology consultation in the ED to replace catheter.[JW1.3] He was found in the ED to also have bradycardia.     Pt has a history of dementia and has a legal guardian. Pt has a history of suprapubic catheter, and now has chronic indwelling rutledge catheter for BPH and urinary retention.[JW1.1] His guardian reports that the  patient's catheter had come out approximately 2 weeks ago and was replaced but  patient had discomfort from the catheter so his guardian took him to see Zelda BARRIGA at [facility] in [place] to verify that rutledge catheter was placed correctly and there were no other complications.[JW1.3]      Past Medical History[JW1.2]   I have reviewed this patient's medical history and updated it with pertinent information if needed.[JW1.1]   Past Medical History:   Diagnosis Date     Anemia      BPH (benign prostatic hyperplasia)      Chronic kidney disease      CVA (cerebral infarction)      Dementia      Gastro-oesophageal reflux disease      Hyperparathyroidism (H)      Hypertension      Hypothyroidism      Neuromuscular disorder (H)      PVD (peripheral vascular disease) (H)[JW1.7]        Past Surgical History[JW1.2]   I have reviewed this patient's surgical history and updated it with pertinent information if needed.[JW1.1]  Past Surgical History:   Procedure Laterality Date     APPENDECTOMY OPEN       ENDARTERECTOMY CAROTID       HERNIA REPAIR       Suprapubic catheter placement[JW1.7]         Prior to Admission Medications   Prior to Admission Medications   Prescriptions Last Dose Informant Patient Reported? Taking?   Acetaminophen (TYLENOL PO) 5/21/2017 at Unknown time  Yes Yes   Sig: Take 1,000 mg by mouth 3 times daily 0800, 1200, 1800   LORazepam (LORAZEPAM INTENSOL) 2 MG/ML (HIGH CONC) solution   Yes Yes   Sig: Take 0.5 mg by mouth every 4 hours as needed for anxiety   LORazepam (LORAZEPAM INTENSOL) 2 MG/ML (HIGH CONC) solution 5/21/2017 at Unknown time  Yes Yes   Sig: Take 0.5 mg by mouth 2 times daily   METOPROLOL SUCCINATE ER PO 5/21/2017 at Unknown time  Yes Yes   Sig: Take 200 mg by mouth 2 times daily Hold & call MD if AP <60 pr SBP <100   OXYCODONE HCL PO   Yes Yes   Sig: Take 5 mg by mouth every 4 hours as needed   OXYCODONE HCL PO 5/21/2017 at Unknown time  Yes Yes   Sig: Take 5 mg by mouth 4 times  daily 0100, 0800, 1400, 2000   RANITIDINE HCL PO 5/21/2017 at Unknown time  Yes Yes   Sig: Take 75 mg by mouth 2 times daily   magnesium hydroxide (MILK OF MAGNESIA) 400 MG/5ML suspension   Yes Yes   Sig: Take 30 mLs by mouth daily as needed for constipation or heartburn   polyethylene glycol (MIRALAX/GLYCOLAX) Packet 5/21/2017 at Unknown time  Yes Yes   Sig: Take 17 g by mouth daily   senna-docusate (SENOKOT-S;PERICOLACE) 8.6-50 MG per tablet 5/21/2017 at Unknown time  Yes Yes   Sig: Take 2 tablets by mouth daily   sennosides (SENOKOT) 8.6 MG tablet   Yes Yes   Sig: Take 2 tablets by mouth every 12 hours as needed for constipation   sennosides (SENOKOT) 8.6 MG tablet 5/21/2017 at Unknown time  Yes Yes   Sig: Take 2 tablets by mouth At Bedtime      Facility-Administered Medications: None     Allergies   Allergies   Allergen Reactions     Lisinopril      Penicillins        Social History[JW1.2]   I have updated and reviewed the following Social History Narrative:[JW1.1]   Social History     Social History Narrative[JW1.2]      Living situation: Pt lives at a nursing home.[JW1.1] His wife passed away at the nursing home approximately 6 months ago.[JW1.3]  Family system: Pt is [JW1.1]. He has a son in [place], and a daughter and niece that live in [place][JW1.3], however pt has assigned a legal guardian to manage his health care.  Functional status (needs help with ADLs or IADLs):[JW1.1] Pt is dependent for his cares.[JW1.3]  Employment/education:[JW1.1] Not assessed.[JW1.3]  Use of community resources:[JW1.1] Pt has a legal guardian for his health care. He lives at [facility] with full nursing care and sws support.[JW1.3]  Activities/interests:[JW1.1] Not assessed.[JW1.3]  History of substance use/abuse:[JW1.1] Not assessed.[JW1.3]  Mandaeism affiliation:[JW1.1] IVAN[JW1.3]  Involvement in sant[place] community:[JW1.1] IVAN[JW1.3]  Impact of illness on patient:[JW1.1] Pt is dependent for ADLS.[JW1.3]    Family  History[JW1.2]   I have reviewed this patient's family history and updated it with pertinent information if needed.[JW1.1]   No family history on file.[JW1.8] Pt is unable to provide information.[JW1.1]    Review of Systems[JW1.2]   Pt is unable to participate in ROS due to dementia, and current sedation.[JW1.3]    Palliative Symptom Review (0=no symptom/no concern, 1=mild, 2=moderate, 3=severe):      Pain:[JW1.1] 0-none[JW1.3]      Fatigue:[JW1.1] 0-none[JW1.3]      Nausea:[JW1.1] 0-none[JW1.3]      Constipation:[JW1.1] 0-none[JW1.3]      Diarrhea:[JW1.1] 0-none[JW1.3]      Depressive Symptoms:[JW1.1] IVAN[JW1.3]      Anxiety:[JW1.1] IVAN[JW1.3]      Drowsiness:[JW1.1] 2-moderate[JW1.3]      Poor Appetite:[JW1.1] 1-mild[JW1.3]      Shortness of Breath:[JW1.1] IVAN[JW1.3]      Insomnia:[JW1.1] 0-none[JW1.3]      Other:[JW1.1]agitation 1-mild[JW1.3]      Overall (0 good/no concerns, 3 very poor):[JW1.1]  2[JW1.3]    Physical Exam   Temp:  [96.1  F (35.6  C)-97.6  F (36.4  C)] 97.5  F (36.4  C)  Heart Rate:  [45-75] 75  Resp:  [20-24] 22  BP: (145-182)/(64-97) 182/82  SpO2:  [93 %-98 %] 93 %  168 lbs 14.4 oz[JW1.2]  GEN:  Sedated, opens eyes to voice, squeezed my hands, appears comfortable, NAD.  HEENT:  Normocephalic/atraumatic, no scleral icterus, no nasal discharge, mouth moist.  CV: Irregular, Irregular,[JW1.1] kelly,[JW1.3] S1, S2;  +3 DP/PT pulses bilatererally;[JW1.1] +1[JW1.3] edema BLE.  RESP:  Clear to auscultation bilaterally without rales/rhonchi/wheezing/retractions.  Symmetric chest rise on inhalation noted.  Normal respiratory effort.  ABD:  Rounded, soft, non-tender/distended.  +BS  EXT:  Edema & pulses as noted above.  CMS intact x 4.     M/S:[JW1.1]   NON-[JW1.3]Tender to palpation.    SKIN:  Dry to touch, no exanthems noted in the visualized areas.    NEURO: Symmetric strength +[JW1.1]2/2[JW1.3].  Sensation to touch intact all extremities.     Psych:[JW1.1]  Sedated[JW1.3] affect.[JW1.1]  Opens eyes to  name. Squeezed my hands to command. Wrinkled face when I stimulated his feet.[JW1.3]    Delirium Screen/CAM:  Unable to completed CAM as pt is sedated.[JW1.1]    Data   Results for orders placed or performed during the hospital encounter of 17 (from the past 24 hour(s))   Troponin I   Result Value Ref Range    Troponin I ES 0.064 (H) 0.000 - 0.045 ug/L   Echocardiogram Complete    Narrative    [id number]  [facility]  [id number]  [id number]^DAVIE^SHERRELL^[facility]  Echocardiography Laboratory  [address] [place] [address]        Name: KEELY GUILLEN  MRN: [id number]  : 1931  Study Date: 2017 09:45 AM  Age: 85 yrs  Gender: Male  Patient Location: [facility]  Reason For Study: Bradycardia - Sinus  Ordering Physician: SHERRELL BRODERICK  Referring Physician: Shirley Toscano  Performed By: Hugo Alves RDCS     BSA: 1.8 m2  Height: 66 in  Weight: 166 lb  HR: 42  BP: 171/66 mmHg  _____________________________________________________________________________  __        Procedure  Complete Portable Echo Adult.  _____________________________________________________________________________  __        Interpretation Summary     1. The left ventricle is normal in structure, function and size. The visual  ejection fraction is estimated at 60%.  2. The right ventricle is mild-moderately dilated. Low-normal RV function.  3. There is moderately severe (3+) tricuspid regurgitation.  4. Pulmonary hypertension The right ventricular systolic pressure is  approximated at 45mmHg plus the right atrial pressure.  5. There is mild (1+) aortic regurgitation.     Echo from  showed mild TR, RVSP 25mmHg.  _____________________________________________________________________________  __        Left Ventricle  The left ventricle is normal in structure, function and size. There is  moderate concentric left ventricular hypertrophy. The visual ejection fraction  is estimated at 60%. Left  ventricular diastolic function is indeterminate.  Normal left ventricular wall motion.     Right Ventricle  The right ventricle is mild to moderately dilated. Low-normal RV function.     Atria  The left atrium is moderately dilated. The right atrium is moderately dilated.  There is no atrial shunt seen.     Mitral Valve  There is trace mitral regurgitation.        Tricuspid Valve  There is moderately severe (3+) tricuspid regurgitation. Pulmonary  hypertension. The right ventricular systolic pressure is approximated at  45mmHg plus the right atrial pressure.     Aortic Valve  There is mild (1+) aortic regurgitation. No aortic stenosis is present.     Pulmonic Valve  The pulmonic valve is normal in structure and function.     Vessels  Normal ascending, transverse (arch), and descending aorta. The IVC is dilated  and fails to change with respiration, suggesting elevated central venous  pressure.     Pericardium  There is no pericardial effusion.        Rhythm  Junctional rhythm.  _____________________________________________________________________________  __  MMode/2D Measurements & Calculations  RVDd: 4.0 cm  IVSd: 1.6 cm     LVIDd: 3.6 cm  LVIDs: 2.0 cm  LVPWd: 1.4 cm  FS: 43.7 %  EDV(Teich): 55.9 ml  ESV(Teich): 13.6 ml  LV mass(C)d: 201.9 grams  LV mass(C)dI: 109.2 grams/m2  Ao root diam: 3.5 cm  LA dimension: 4.8 cm  asc Aorta Diam: 3.4 cm  LA/Ao: 1.4  LVOT diam: 2.0 cm  LVOT area: 3.1 cm2  LA Volume (BP): 86.0 ml  LA Volume Index (BP): 46.5 ml/m2           Doppler Measurements & Calculations  MV E max rogerio: 74.5 cm/sec  MV dec time: 0.23 sec  AI P1/2t: 858.9 msec  TR max rogerio: 322.0 cm/sec  TR max P.5 mmHg  Lateral E/e': 9.0  Medial E/e': 11.2           _____________________________________________________________________________  __           Report approved by: Stiven Clinton 2017 01:14 PM      Lactic acid level STAT   Result Value Ref Range    Lactic Acid 2.7 (H) 0.7 - 2.1 mmol/L   Basic  metabolic panel   Result Value Ref Range    Sodium 146 (H) 133 - 144 mmol/L    Potassium 3.7 3.4 - 5.3 mmol/L    Chloride 118 (H) 94 - 109 mmol/L    Carbon Dioxide 18 (L) 20 - 32 mmol/L    Anion Gap 10 3 - 14 mmol/L    Glucose 104 (H) 70 - 99 mg/dL    Urea Nitrogen 71 (H) 7 - 30 mg/dL    Creatinine 1.63 (H) 0.66 - 1.25 mg/dL    GFR Estimate 40 (L) >60 mL/min/1.7m2    GFR Estimate If Black 49 (L) >60 mL/min/1.7m2    Calcium 10.1 8.5 - 10.1 mg/dL   CBC with platelets   Result Value Ref Range    WBC 16.9 (H) 4.0 - 11.0 10e9/L    RBC Count 3.16 (L) 4.4 - 5.9 10e12/L    Hemoglobin 9.5 (L) 13.3 - 17.7 g/dL    Hematocrit 30.7 (L) 40.0 - 53.0 %    MCV 97 78 - 100 fl    MCH 30.1 26.5 - 33.0 pg    MCHC 30.9 (L) 31.5 - 36.5 g/dL    RDW 14.9 10.0 - 15.0 %    Platelet Count 235 150 - 450 10e9/L   Lactic acid whole blood   Result Value Ref Range    Lactic Acid 1.7 0.7 - 2.1 mmol/L   EKG 12-lead, tracing only   Result Value Ref Range    Interpretation ECG Click View Image link to view waveform and result[JW1.2]               Revision History        User Key Date/Time User Provider Type Action    > JW1.3 5/25/2017  9:42 PM Oxana Garza APRN CNS Clinical Nurse Specialist Sign     JW1.6 5/25/2017  4:06 PM Oxana Garza APRN CNS Clinical Nurse Specialist      JW1.5 5/25/2017  1:46 PM Oxana Garza APRN CNS Clinical Nurse Specialist      JW1.4 5/25/2017 12:30 PM Oxana Garza APRN CNS Clinical Nurse Specialist      JW1.8 5/25/2017  9:53 AM Oxana Garza APRN CNS Clinical Nurse Specialist      JW1.7 5/25/2017  9:52 AM Oxana Garza APRN CNS Clinical Nurse Specialist      JW1.2 5/25/2017  9:27 AM Oxana Garza APRN CNS Clinical Nurse Specialist      JW1.1 5/25/2017  9:26 AM Oxana Garza APRN CNS Clinical Nurse Specialist             Consults by Albaro Moser MD at 5/24/2017  8:50 AM     Author:  Albaro Moser MD Service:  Cardiology Author Type:   Physician    Filed:  5/24/2017  9:39 AM Date of Service:  5/24/2017  8:50 AM Creation Time:  5/24/2017  8:38 AM    Status:  Signed :  Albaro Moser MD (Physician)     Consult Orders:    1. Cardiology IP Consult: Patient to be seen: Routine - within 24 hours; bradycardia; Consultant may enter orders: Yes [[id number]] ordered by Hardeep Man DO at 05/24/17 0754                [facility]    CARDIOLOGY CONSULT    Date of Admission:  5/22/2017  Date of Consult: May 24, 2017    ASSESSMENT:[BW1.1]  85-year-old male seen for junctional bradycardia.  It is unclear how long he has had a heart rate in the 40s or a junctional rhythm.  Suspect this could be related to his very high dose of Toprol.  However heart rate has not really improved with greater than 48 hours of it being held (half-life is up to 9 hours).  Otherwise he is hemodynamically stable with no evidence of hypoperfusion.  There are no reports of lightheadedness or syncope.    Echocardiogram will be done to assess ejection fraction and valves.  There is no real suspicion for ischemia or acute coronary syndrome at this point.  With his advanced age and advanced dementia, ideally this can be treated conservatively in the near future.  It would not be surprising if his heart rate comes up in the next few days or more with holding the metoprolol.  There is no immediate indication for pacemaker, but if he remains having a heart rate of 40s with a junctional rhythm, it may be indicated.[BW1.2]    RECOMMENDATIONS:[BW1.1]  1. Junctional bradycardia with heart rate around 40  - Echocardiogram  - Continue to hold Toprol  - Would not recommend pacemaker at this time, but he probably should be watched one or 2 more days as an inpatient  - If he remains otherwise stable with a heart rate in the 40s, he could have an outpatient monitor in a few weeks to assess for any pauses or more profound bradycardia.  If that were found, this would  be a stronger indication for pacemaker    Thank you for this consult.  We will continue to follow along.[BW1.2]    Albaro Moser MD  Cardiology - [facility]  Pager:  [phone]  Text Page  May 24, 2017    CODE STATUS:  DNR/DNI    REASON FOR CONSULT: bradycardia    PRIMARY CARE PHYSICIAN:  Shirley Toscano    HISTORY OF PRESENT ILLNESS:[BW1.1]  85-year-old male with dementia and no cardiac history is seen for bradycardia.    Echocardiogram and nuclear stress test in 2011 were normal.  Holter monitor from 2011 showed sinus rhythm with variable P-wave morphology, average heart rate 76, 3% PVCs, 10% PACs, SVT runs up to 12 beats.    At baseline patient lives in a memory care unit, he has at least moderate dementia.  He needs assistance with all of his ADLs including eating meals.  History today is very limited, he cannot recall any details of his medical history, he cannot remember where he lives.    He has a history of urinary retention and had a supra pubic catheter.  This became dislodged and he presented to the emergency department for this issue.  He was found to be in a junctional rhythm with heart rate around 40, blood pressure was 160/100, there was no evidence of hypo-perfusion.  There is no reported lightheadedness, dizziness, or syncope.    He had been on Toprol 200 mg b.i.d. since at least 2014.  This has been held since admission on May 22.  Heart rate has remained in a junctional rhythm ranging from 37-45 with no clear increase over the past 48 hours.  There has been no reports of significant shortness of breath or chest pain.    In review of older notes, heart rate was in the 70s to 80s in 2014.  As noted above heart rate was in the 70s back in 2011.[BW1.2]    PAST MEDICAL HISTORY:  1.  Anemia  2.  BPH  3.  CVA  4.  Chronic kidney disease next line 5.  Gastric reflux  6.  Hypothyroidism  7.  Hypertension  8.  Peripheral vascular disease  9.  Dementia    HOME MEDICATIONS:  Prior to Admission  Medications   Prescriptions Last Dose Informant Patient Reported? Taking?   Acetaminophen (TYLENOL PO) 5/21/2017 at Unknown time  Yes Yes   Sig: Take 1,000 mg by mouth 3 times daily 0800, 1200, 1800   LORazepam (LORAZEPAM INTENSOL) 2 MG/ML (HIGH CONC) solution   Yes Yes   Sig: Take 0.5 mg by mouth every 4 hours as needed for anxiety   LORazepam (LORAZEPAM INTENSOL) 2 MG/ML (HIGH CONC) solution 5/21/2017 at Unknown time  Yes Yes   Sig: Take 0.5 mg by mouth 2 times daily   METOPROLOL SUCCINATE ER PO 5/21/2017 at Unknown time  Yes Yes   Sig: Take 200 mg by mouth 2 times daily Hold & call MD if AP <60 pr SBP <100   OXYCODONE HCL PO   Yes Yes   Sig: Take 5 mg by mouth every 4 hours as needed   OXYCODONE HCL PO 5/21/2017 at Unknown time  Yes Yes   Sig: Take 5 mg by mouth 4 times daily 0100, 0800, 1400, 2000   RANITIDINE HCL PO 5/21/2017 at Unknown time  Yes Yes   Sig: Take 75 mg by mouth 2 times daily   magnesium hydroxide (MILK OF MAGNESIA) 400 MG/5ML suspension   Yes Yes   Sig: Take 30 mLs by mouth daily as needed for constipation or heartburn   polyethylene glycol (MIRALAX/GLYCOLAX) Packet 5/21/2017 at Unknown time  Yes Yes   Sig: Take 17 g by mouth daily   senna-docusate (SENOKOT-S;PERICOLACE) 8.6-50 MG per tablet 5/21/2017 at Unknown time  Yes Yes   Sig: Take 2 tablets by mouth daily   sennosides (SENOKOT) 8.6 MG tablet   Yes Yes   Sig: Take 2 tablets by mouth every 12 hours as needed for constipation   sennosides (SENOKOT) 8.6 MG tablet 5/21/2017 at Unknown time  Yes Yes   Sig: Take 2 tablets by mouth At Bedtime      Facility-Administered Medications: None       ALLERGIES:  Allergies   Allergen Reactions     Lisinopril      Penicillins        SOCIAL HISTORY:  I have reviewed this patient's social history and updated it with pertinent information if needed. Edil GUTIERREZ John  reports that he has quit smoking. He does not have any smokeless tobacco history on file. He reports that he does not drink alcohol or use illicit  drugs.    FAMILY HISTORY:  I have reviewed this patient's family history and updated it with pertinent information if needed.   No family history on file.    REVIEW OF SYSTEMS:[BW1.1]  Unobtainable due to dementia.[BW1.2]    PHYSICAL EXAM:  Temp: 96.2  F (35.7  C) Temp src: Axillary BP: 171/66 (MD aware of HTN-cardiology consult ordered)  Heart Rate: 44 (MD aware-cardiology consult ordered) Resp: 20 SpO2: 97 % O2 Device: None (Room air)    Vital Signs with Ranges  Temp:  [95.4  F (35.2  C)-96.2  F (35.7  C)] 96.2  F (35.7  C)  Heart Rate:  [41-66] 44  Resp:  [18-22] 20  BP: (130-177)/(57-81) 171/66  SpO2:  [90 %-97 %] 97 %  166 lbs 3.2 oz    Constitutional: awake, alert, no distress  Eyes: PERRL, sclera nonicteric  ENT: trachea midline  Respiratory:[BW1.1] lungs clear[BW1.2]  Cardiovascular:[BW1.1] regular, bradycardic, no murmur[BW1.2]  GI: nondistended, nontender, bowel sounds present  Lymph/Hematologic: no lymphadenopathy  Skin: dry, no rash  Musculoskeletal: good muscle tone, strength 5/5 in upper and lower extremities  Neurologic: no focal deficits  Neuropsychiatric: appropriate affact    Intake/Output Summary (Last 24 hours) at 05/24/17 0838  Last data filed at 05/24/17 0629   Gross per 24 hour   Intake             1165 ml   Output              650 ml   Net              515 ml     DATA:  Labs: Potassium 4.7, bicarbonate 19, BUN 85, creatinine 1.95, lactate 2.0, troponin 0.04, white count 14, hemoglobin 9.6, platelets 280    EKG:[BW1.1] May 22, 2017: Junctional rhythm, rate 40  May 24, 2017: Junctional rhythm, rate 47    Telemetry: Junctional rhythm, heart rate between 37 and 45, there has not been any significant increase in heart rate since admission on May 22 until this a.m., no significant positives[BW1.2]    CXR: May 22: No airspace disease, no pleural effusion    CT abdomen pelvis, May 22: Large nonobstructing bladder stone measuring 4 cm, nonobstructing 1.2 cm stone in the lower pole of the right  kidney[BW1.1]             Revision History        User Key Date/Time User Provider Type Action    > BW1.2 5/24/2017  9:39 AM Albaro Moser MD Physician Sign     BW1.1 5/24/2017  8:38 AM Albaro Moser MD Physician             Consults by Farheen Archibald at 5/23/2017  9:36 AM     Author:  Farheen Archibald Service:  Social Work Author Type:      Filed:  5/23/2017  9:36 AM Date of Service:  5/23/2017  9:36 AM Creation Time:  5/23/2017  9:34 AM    Status:  Signed :  Farheen Archibald ()     Consult Orders:    1. Social Work IP Consult [[id number]] ordered by Kat Quiroga PA-C at 05/22/17 1419                D:  Discharge planning  I/A:  Verified that pt is from [facility] and has an 18 day MA bed hold.  P:  SW remains available should needs arise.[AJ1.1]     Revision History        User Key Date/Time User Provider Type Action    > AJ1.1 5/23/2017  9:36 AM Farheen Archibald  Sign            Consults by Jacqueline Arshad PA-C at 5/22/2017 11:36 AM     Author:  Jacqueline Arshad PA-C Service:  Urology Author Type:  Physician Assistant - C    Filed:  5/22/2017 12:52 PM Date of Service:  5/22/2017 11:36 AM Creation Time:  5/22/2017 11:36 AM    Status:  Attested :  Jacqueline Arshad PA-C (Physician Assistant - C)    Cosigner:  Hugo Gay MD at 5/22/2017  6:20 PM         Consult Orders:    1. Urology IP Consult: urinary retention, unable to replace catheter; Consultant may enter orders: Yes; Patient to be seen: Routine - within 24 hours; [facility]ed Clinic[facility]; Requested Provider: Dr. Phalen aware [[id number]] ordered by Kat Quiroga PA-C at 05/22/17 1049           Attestation signed by Hugo Gay MD at 5/22/2017  6:20 PM        Physician Attestation   IHUGO, saw and evaluated Edil Lopez as part of a shared visit.  I have reviewed and discussed with the advanced practice provider  their history, physical and plan.    I personally reviewed the vital signs, medications, labs and imaging.    My key history or physical exam findings: Urinary retention - Bladder stones / Right kidney stone (non-obstructing)    Key management decisions made by me:     Rutledge catheter insertion (5/22/17) - patient placed in supine position. Penis was prepped and draped in usual sterile fashion. 2% viscous lidocaine was used for local anesthesia. A 16 Fr coude rutledge was inserted into the bladder with minimal resistance. The balloon was inflated with 30 mL of saline. 400 mL of cloudy urine was drained from bladder.    1. Urinary retention - chronic issue   - continue rutledge catheter     - change rutledge catheter every 4 weeks (recommend inflating balloon with 30 mL to prevent him form pulling out the catheter)    2. ?UTI / sepsis - elevated lactic acid   - check UCx   - recommend starting Levaquin 500 mg IV q 24 hrs    3. Bladder stones (largest is 3.9 cm) and Right kidney stone   - currently asymptomatic   - defer future treatment to Dr. Hopson    4. F/U with Dr. Hopson as scheduled      LITA NAJERA  Date of Service (when I saw the patient): 05/22/17                               Urology consult for: Chronic urinary retention,[MS1.1] catheter self-removed overnight,[MS1.2] inability to replace catheter   Req provider: SHERWIN Quiroga    History obtained from chart review and discussion with bedside staff  HPI: Pt is a 86yo male pt of Dr. Hopson of [facility]. Pt has a PMH significant for BPH, atonic bladder, and urinary retention. Pt has had a chronic catheter in place for years. Pt had an SP tube placed in the past for urinary retention and urethral erosion. Roughly 1-2 months ago pt pulled the SP tube out and it was unable to be replaced. However, a rutledge catheter was able to be placed per urethra. This was kept in place until late yesterday when he self-removed the catheter late in the day. Staff at his  nursing facility were unable to re-establish the cathter, so the patient was sent to [facility] ED for further evaluation and treatment. Nursing staff in the [facility] ED were unable to replace catheter, so urology was consulted.     Today, pt is found laying in bed in no acute distress. Pt complains of penile discomfort and the urge to void. Pt has dementia and is otherwise unable to provide history. There are[MS1.1] no[MS1.3] family members or[MS1.1] guardians[MS1.3] at bedside to provide history.     Past Medical History:   Diagnosis Date     Anemia      BPH (benign prostatic hyperplasia)      Chronic kidney disease      CVA (cerebral infarction)      Dementia      Gastro-oesophageal reflux disease      Hyperparathyroidism (H)      Hypertension      Hypothyroidism      Neuromuscular disorder (H)      PVD (peripheral vascular disease) (H)      Review Of Systems: Unable to do full ROS due to impaired pt mentation- dementia  General: Denies F/C  Respiratory: Denies SOB  Cardiovascular: Denies CP  Gastrointestinal: Denies N/V, is thirsty  Genitourinary: See HPI  Musculoskeletal: Denies LE pain    Past Surgical History:   Procedure Laterality Date     APPENDECTOMY OPEN       ENDARTERECTOMY CAROTID       HERNIA REPAIR       Suprapubic catheter placement       Social Hx:  Social History     Social History     Marital status:      Spouse name: N/A     Number of children: N/A     Years of education: N/A     Occupational History     Not on file.     Social History Main Topics     Smoking status: Former Smoker     Smokeless tobacco: Not on file     Alcohol use No     Drug use: No     Sexual activity: No     Other Topics Concern     Not on file     Social History Narrative     Meds:  Prescriptions Prior to Admission   Medication Sig Dispense Refill Last Dose     sennosides (SENOKOT) 8.6 MG tablet Take 2 tablets by mouth every 12 hours as needed for constipation        LORazepam (LORAZEPAM INTENSOL) 2 MG/ML (HIGH CONC) solution  Take 0.5 mg by mouth every 4 hours as needed for anxiety        OXYCODONE HCL PO Take 5 mg by mouth every 4 hours as needed        magnesium hydroxide (MILK OF MAGNESIA) 400 MG/5ML suspension Take 30 mLs by mouth daily as needed for constipation or heartburn        Acetaminophen (TYLENOL PO) Take 1,000 mg by mouth 3 times daily 0800, 1200, 1800   5/21/2017 at Unknown time     METOPROLOL SUCCINATE ER PO Take 200 mg by mouth 2 times daily Hold & call MD if AP <60 pr SBP <100   5/21/2017 at Unknown time     RANITIDINE HCL PO Take 75 mg by mouth 2 times daily   5/21/2017 at Unknown time     sennosides (SENOKOT) 8.6 MG tablet Take 2 tablets by mouth At Bedtime   5/21/2017 at Unknown time     senna-docusate (SENOKOT-S;PERICOLACE) 8.6-50 MG per tablet Take 2 tablets by mouth daily   5/21/2017 at Unknown time     polyethylene glycol (MIRALAX/GLYCOLAX) Packet Take 17 g by mouth daily   5/21/2017 at Unknown time     LORazepam (LORAZEPAM INTENSOL) 2 MG/ML (HIGH CONC) solution Take 0.5 mg by mouth 2 times daily   5/21/2017 at Unknown time     OXYCODONE HCL PO Take 5 mg by mouth 4 times daily 0100, 0800, 1400, 2000   5/21/2017 at Unknown time      Allergies   Allergen Reactions     Lisinopril      Penicillins      Labs:  ROUTINE IP LABS (Last four results)  BMP  Recent Labs  Lab 05/22/17  0230      POTASSIUM 4.2   CHLORIDE 112*   JENNA 10.9*   CO2 21   BUN 57*   CR 1.81*   *     CBC  Recent Labs  Lab 05/22/17  0230   WBC 13.2*   RBC 3.32*   HGB 9.7*   HCT 31.4*   MCV 95   MCH 29.2   MCHC 30.9*   RDW 14.4        UA RESULTS:  Recent Labs   Lab Test  04/27/17   1604   COLOR  Katie   APPEARANCE  Cloudy   URINEGLC  Negative   URINEBILI  Negative   URINEKETONE  Negative   SG  1.018   UBLD  Moderate*   URINEPH  7.0   PROTEIN  100*   NITRITE  Positive*   LEUKEST  Large*   RBCU  122*   WBCU  >182*     Imaging:CT ABDOMEN AND PELVIS WITHOUT CONTRAST 5/22/2017 9:43 AM      HISTORY: Unable to urinate. Evaluate for  stones.     TECHNIQUE: Volumetric helical sections were acquired from the lung  bases through the ischial tuberosities without IV contrast. Coronal  images were also reconstructed. Radiation dose for this scan was  reduced using automated exposure control, adjustment of the mA and/or  kV according to patient size, or iterative reconstruction technique.     COMPARISON: None.     FINDINGS: There is a large nonobstructing bladder stone posteriorly,  measuring 3.9 x 2.1 cm. A few smaller adjacent bladder stones are also  noted. Small amount of air within the urinary bladder may be related  to recent instrumentation, although a colovesical fistula cannot be  excluded. There is mild prostatic enlargement and central  calcification. Nonobstructing 1.2 cm stone in the lower pole of the  right kidney. Cyst in the lower pole of the left kidney measures 2.2  cm. No hydronephrosis.     No bowel obstruction. Colonic diverticulosis. Mild fat stranding  adjacent to the proximal sigmoid colon could represent acute  diverticulitis. No associated fluid collections are identified to  suggest abscess. There is a small area of extraluminal air extending  from the rectosigmoid region along the superior aspect of the prostate  gland (series 3 image 90; series 2 image 70). No free fluid in the  pelvis. Moderate atherosclerotic aortoiliac calcification. Ectasia of  the infrarenal abdominal aorta measures up to 2.8 cm AP. The liver,  gallbladder, spleen, adrenal glands, and pancreas have unremarkable  noncontrast appearances. Cardiac enlargement. Coronary artery  calcification. Mild fibrotic changes about the periphery of both lung  bases. Indeterminate 0.8 cm pulmonary nodule at the left lung base  posteriorly (series 2 image 17). Degenerative changes are noted  throughout the visualized thoracolumbar spine and in both hips.      IMPRESSION:   1. Large nonobstructing bladder stone measures 3.9 cm, with a few  smaller bladder stones also  noted.  2. Nonobstructing 1.2 cm stone in the lower pole of the right kidney.  3. Mild bowel wall thickening and fat stranding involving the proximal  sigmoid colon could represent a mild diverticulitis. Please clinically  correlate.  4. Small amount of extraluminal air extends anteriorly from the  rectosigmoid region along the anterior aspect of the prostate gland.  There is also a small amount of air within the urinary bladder. A  colovesical fistula in this location cannot be excluded. Clinical  correlation is again requested.  5. Indeterminate 0.8 cm pulmonary nodule at the left lung base  posteriorly.     MARGARITO LYONS MD    Exam:  /49 (BP Location: Right arm)  Pulse (!) 47  Temp 97.6  F (36.4  C) (Oral)  Resp 16  SpO2 99%    EXAM:   Constitutional: healthy, alert, no acute distress and cooperative   Cardiovascular: RRR  Respiratory: no secondary muscle use  Psychiatric: mentation appears[MS1.1] ab[MS1.2]normal[MS1.1]- oriented only to self[MS1.2]  Neck: no asymmetry  Abdomen:  abdomen soft, no[MS1.1] point[MS1.2] tender[MS1.1]ness, though pt does not like having abdomen palpated[MS1.2].   :[MS1.1] Uncirc penis, ventral erosion of the meatus, no blood or discharge per urethra. Foreskin retracted and meatus cleaned with iodine solution. Urojet x2 were instilled per urethra, though some leaked out due to the meatal erosion. Pt had discomfort with both iodine and urojet application. 16Fr coude catheter was then inserted per urethra. Initially there was no resistance, but then after ~3-4 inches were inserted, there was resistance. Gentle attempts to insert the catheter and rotate it failed to allow it to advance further. After these attempt failed, catheter was removed and there was a very minor amount of at the distal 1-2 inches of the catheter that was attempted to be placed[MS1.2]  MSK: no calf tenderness, no edema  Skin: no open lesions[MS1.1] other than a closed healing non-infected 1 cm  abrasion to the left inner thigh[MS1.2], extremities warm and well perfused  Hematology: no bruising on visible skin    Assessment/plan:    Chronic urinary retention,[MS1.1] catheter self-removed overnight,[MS1.2] inability to replace catheter[MS1.1]. I failed to place catheter, suspect a false passage   -Urologist will place catheter via bedside cystoscopy[MS1.3]      Discussed exam findings, lab and imaging results and plan with [MS1.1] Fa[MS1.2]dd[MS1.3]en[MS1.2].  Please contact me with any questions or concerns.     Jacqueline Arshad PA-C  [facility]  Pager:  [phone]  After 4pm and on weekends, please call [phone][MS1.1]       Revision History        User Key Date/Time User Provider Type Action    > MS1.3 5/22/2017 12:52 PM Jacqueline Arshad PA-C Physician Assistant - C Sign     MS1.2 5/22/2017 11:58 AM Jacqueline Arshad PA-C Physician Assistant - C      MS1.1 5/22/2017 11:36 AM Jacqueline Arshad PA-C Physician Assistant - C                      Progress Notes - Physician (Notes from 05/29/17 through 06/01/17)      Progress Notes by Jessika Moore LSW at 6/1/2017 11:02 AM     Author:  Jessika Moore LSW Service:  (none) Author Type:      Filed:  6/1/2017 11:13 AM Date of Service:  6/1/2017 11:02 AM Creation Time:  6/1/2017 11:02 AM    Status:  Signed :  Jessika Moore LSW ()         SWS:  D: discharge planning   A/P: Pt able to return to [facility] today.  [facility] contacted and they are able to transport at 1:00pm. [facility] updated and orders faxed.[LH1.1]      Revision History        User Key Date/Time User Provider Type Action    > LH1.1 6/1/2017 11:13 AM Jessika Moore LSW  Sign            Progress Notes by Stanislaw Franco MD at 6/1/2017 10:17 AM     Author:  Stanislaw Franco MD Service:  Hospitalist Author Type:  Physician    Filed:  6/1/2017 10:18 AM Date of Service:  6/1/2017 10:17 AM Creation Time:  6/1/2017  10:17 AM    Status:  Signed :  Stanislaw Franco MD (Physician)         Patient seen and examined by me this morning.  He is stable this morning up in chair.  Care plan was discussed with palliative care team and plan for comfort measures discussed.  Patient to continue medications  if he is able to take it.  Hospice team will see patient at care facility for further review his medication or the need to continue chronic medications.[MA1.1]     Revision History        User Key Date/Time User Provider Type Action    > MA1.1 6/1/2017 10:18 AM Stanislaw Franco MD Physician Sign            Progress Notes by Oxana Garza APRN CNS at 6/1/2017  9:32 AM     Author:  Oxana Garza APRN CNS Service:  Palliative Author Type:  Clinical Nurse Specialist    Filed:  6/1/2017 10:18 AM Date of Service:  6/1/2017  9:32 AM Creation Time:  6/1/2017  9:32 AM    Status:  Signed :  Oxana Garza APRN CNS (Clinical Nurse Specialist)         [facility]  Palliative Care Progress Note  Text Page           Assessment & Plan   I was asked to see the patient for goals of care.     Recommendations:  1. Pain - tylenol 975mg q 8 hours. Decrease Oxycodone scheduled (from PTA) to 2.5 mg 4 times daily and hold for sedation . Reposition for comfort.[JW1.1]  Also with painful, erythematous old IV sites L antecubital and R inner wrist. Discussed with Dr. Franco. Will order voltaren gel and heat therapy prn for discharge.[JW1.2]  2. Agitation - Decrease Ativan to 0.25mg BID. Prn ativan 0.25mg q 8 hours prn. DC seroquel. Promote food and fluids. Appreciate nursing assistance to keep pt awake during daytime.  following.  3. Constipation - miralax daily scheduled, and miralax daily prn, senna-docusate 1-2 tabs po BID, dulcolax suppository prn.      Goal of Care: DNR/DNI.  Conference with Dr. Franco and shahid Calvert 5/31/17. Pt is now comfort care. Will continue amiodarone orally at  discharge for pt's comfort. Pt to be assessed for hospice when discharged back to [facility]. Filemon would like it arranged for [facility] to have this done at [facility] after discharge. Emergency number for guardian if issues arise is [phone] Raulito Flores.     Disease Process/es & Symptoms:  Edil Lopez is a 85 year old patient admitted for rutledge catheter replacement requiring urology consult to replace in ED. Pt was also noted with symptoms of bradycardia. He has been treated for junctional rhythm, dysphagia, UTI, urinary retention, ARF with CKD and HTN.   Pt has a hx of SP catheter for BPH and urinary retention, but had been converted to rutledge catheter. His catheter had fallen out approximately 2 weeks ago and was successfully replaced by NH staff, and was examined by Zelda BARRIGA from [facility] in [place].      This is in the setting of dementia, hx of CVA, s/p carotid endarterectomy, HTN, CKD, GERD, PVD, Hypothyroidism, Hyperparathyroidism. Guardian reports that pt has had a 9# weight loss over 6 month period. He had been assessed for HOSPICE appropriateness at NH, however did not meet criteria. Guardian believes pt is comfort care at [facility] per POLST.       He has been hospitalized in the ED for rutledge catheter or SP catheter problems and UTI  2 times in the past 9 months. In Pt's clinical record, his weight is increased 20# since 5/2016.     The following symptoms are noted as concerning to pt quality of life - per nursing staff, providers and guardian.  Pain in low abdomin  Agitation     Psychosocial/Spiritual Needs:     Oriented guardian to Spiritual Health and Social Work Services as part of Palliative Care team.  is following.     Decision-Making & Goals of Care:  Discussion/counseling today about goals of care/decisions:   6/1/2017[JW1.1] Pt to discharge today back to LTC with comfort care status with [facility] to assess after discharge for hospice support and  "follow up with pt's shahid Filemon. Orders for symptom management completed for discharge. Pt informed. \"I knew I was going somewhere\". Discussed with SWS who is arranging transport. Shahid Colbert is aware of transfer today.[JW1.2]  5/31/2017 Updated pt's shahid Colbert this am by phone. Conference with Filemon and Dr. Franco at 1pm. 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 Recommendation for comfort care approach. Pt is not ambulatory, incontinent of bowel, unable to sit up independently. Dr. Franco will review med list prior to discharge, and keep medications that will promote pt comfort including amiodarone and pt will take until he is not able to. Filemon is agreeable to this plan. POLST Updated. Filemon has the original copy. Pt to be assessed for appropriateness for Hospice by [facility] after return to [facility].  5/30/2017 Message left with shahid Colbert. Message left for lisa Geronimo to follow pt on 6/1/17. Discussed with SWS regarding discharge plan with possible assessment by [facility] after return back to [facility].  5/26/2017 Met with pt's shahid Calvert and Dr. Man.  Reviewed pt's current status with heart rhythm issues, UTI with underlying dementia. Risks and benefits of various medical and procedural interventions in pt with dementia explained. Guardian shares that pt's son, dtr and niece are not involved with pt, do not visit. Pt does not interact when taken to special events at [facility]. Pt is always in bed, often sleeping, incont of bowel. No pictures on the wall, no apparent belgica or pleasures. Pt does speak. Is total care for feeding, bathing and other ADLS. He does not walk. He does not sit up independently. Filemon is comfortable with a hospice, palliative approach but would like " to talk with pt's children before making decision for comfort care as he knows pt will stop his heart medication. Filemon requests for [facility] to assist with [facility] assessment for Hospice support for pt at discharge back to [facility]. Filemon states not to take pt to ICU, or offer pressors. IV or oral medications to help with heart rhythm are ok. Filemon understands that pt will be discharged back to [facility] when MDs feel that his UTI is improving and he is stable with his heart rhythm and blood pressure. POLST from 10/10/2014 is still accurate and [facility] has a copy of this.  5/25/17 Per [facility], pt has a guardian. [facility] has been attempting to obtain contact information and documents for much of the day.  Was contacted by natalie Calvert at 3:30 pm today. Per guardian, Pt and his wife were both residents of [facility]. Wife passed away 6 months ago in Hospice. [facility] at [facility] had recommended hospice 9-10 months ago for pt, however, he did not meet criteria at that time.  Guardian states pt's baseline currently is that he is verbal, but forgetful and no short term memory. Pt is in bed when guardian sees him although [facility] staff state he gets up to a WC. Pt is incontinent of bowel. Has indwelling catheter. Requires help for cares and is fed. Natalie believes that pt is comfort care per his most recent POLST. Have requested a copy. Guardian works M-F, and only is available until 1pm on Friday. Have requested a care conference with Natalie, [facility], Dr. Man for 11:30am to review most recent POLST, pt condition, and establish POC. If pt now meets HOSPICE criteria, guardian reports that [facility] is the provider that would be selected.     Patient has decision-making capacity Unreliable  Patient has a court-appointed guardian/conservator for healthcare decisions in a legal document . See name(s) and contact information in Health Care Agent/Legal Guardian section below.     Name: Filemon Calvert  Relationship: guardian, Phone(s): [phone]. Emergency number for weekends and holidays [phone].  Appeciate SWS help obtaining legal guardian documents.        Patient has a completed health care directive available in the chart (Y/N): N  Physician orders for life-sustaining treatment (POLST) form is on file dated 10/10/14.  Have confirmed that this is the most updated POLST with guardinoelle Colbert.  Code Status:                     Do not resusitate / Do not intubate     Oxana BARRIGA, CNS  Pain Management and Palliative Care  [facility]  Pgr: [phone]    Time Spent on this Encounter   I spent[JW1.1] 10[JW1.2] minutes in assessment of the patient and discussion with the patient. Another 2[JW1.1]5[JW1.2] minutes in review of chart, documentation and discussion with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 aggressive, we can discuss possible PPM implantation (though not a good  candidate).     No changes at present. Please call us back when a decision has been made.     Daron Cardoso PA-C[AS1.1]         Revision History        User Key Date/Time User Provider Type Action    > AS1.1 5/31/2017  9:56 AM Daron Cardoso PA-C Physician Assistant - FARHAD Sign            Progress Notes by Oxana Garza APRN CNS at 5/30/2017  1:49 PM     Author:  Oxana Garza APRN CNS Service:  Palliative Author Type:  Clinical Nurse Specialist    Filed:  5/30/2017  5:45 PM Date of Service:  5/30/2017  1:49 PM Creation Time:  5/30/2017  1:49 PM    Status:  Signed :  Oxana Garza APRN CNS (Clinical Nurse Specialist)         [facility]  Palliative Care Progress Note  Text Page    5/30 1:50pm left message for Filemon Calvert for update and goals of care planning. He is expected back to the office after 3:30 today.[JW1.1]   5:30pm - no return call today. Will call in am.[JW1.2]       Assessment & Plan   I was asked to see the patient for goals of care.     Recommendations:  1. Pain - tylenol 975mg q 8 hours. Decrease Oxycodone scheduled (from PTA) to 2.5 mg 4 times daily and hold for sedation .Hydromorphone 0.2mg IV q 3 hours prn pain not relieved with oxycodone. Reposition for comfort.  2. Agitation - Decrease Ativan to 0.25mg BID. Prn ativan 0.25mg q 8 hours prn. DC seroquel. Promote food and fluids. Appreciate nursing assistance to keep pt awake during daytime.[JW1.1]  following.  3. Constipation - miralax daily scheduled, and miralax daily prn, senna-docusate 1-2 tabs po BID, dulcolax suppository prn.[JW1.3]      Goal of Care: DNR/DNI.  Per guardian Filemon Calvert, ok for IV or oral medications to control pt's heart rate. NO ICU transfers, no pressors.   He  would like to talk with pt's children prior to making him comfort care. If pt is discharged back to [place], he will follow up there and update pt's POLST to comfort care. Filemon requests [facility] to  assess pt for Hospice and would like him enrolled if he meets criteria. If pt can not be assessed for Hospice prior to discharge, Filemon would like it arranged for them to have this done at [facility] after discharge. Emergency number for guardian if issues arise is [phone] Raulito Flores.     Disease Process/es & Symptoms:  Edil Lopez is a 85 year old patient admitted for rutledge catheter replacement requiring urology consult to replace in ED. Pt was also noted with symptoms of bradycardia. He has been treated for junctional rhythm, dysphagia, UTI, urinary retention, ARF with CKD and HTN.   Pt has a hx of SP catheter for BPH and urinary retention, but had been converted to rutledge catheter. His catheter had fallen out approximately 2 weeks ago and was successfully replaced by NH staff, and was examined by Zelda BARRIGA from [facility] in [place].      This is in the setting of dementia, hx of CVA, s/p carotid endarterectomy, HTN, CKD, GERD, PVD, Hypothyroidism, Hyperparathyroidism. Guardian reports that pt has had a 9# weight loss over 6 month period. He had been assessed for HOSPICE appropriateness at NH, however did not meet criteria. Guardian believes pt is comfort care at [facility] per POLST.       He has been hospitalized in the ED for rutledge catheter or SP catheter problems and UTI  2 times in the past 9 months. In Pt's clinical record, his weight is increased 20# since 5/2016.     The following symptoms are noted as concerning to pt quality of life - per nursing staff, providers and guardian.  Pain in low abdomin  Agitation     Psychosocial/Spiritual Needs:     Oriented guardian to Spiritual Health and Social Work Services as part of Palliative Care team.  is following.     Decision-Making & Goals of Care:  Discussion/counseling today about goals of care/decisions:   5/30/2017 Message left with shahid Colbert.[JW1.1] Message left for lisa Geronimo to follow pt on 6/1/17. Discussed  with [facility] regarding discharge plan with possible assessment by [facility] after return back to [facility].[JW1.3]  5/26/2017 Met with pt's guardian Filemon Calvert and Dr. Man.  Reviewed pt's current status with heart rhythm issues, UTI with underlying dementia. Risks and benefits of various medical and procedural interventions in pt with dementia explained. Guardian shares that pt's son, dtr and niece are not involved with pt, do not visit. Pt does not interact when taken to special events at [facility]. Pt is always in bed, often sleeping, incont of bowel. No pictures on the wall, no apparent belgiac or pleasures. Pt does speak. Is total care for feeding, bathing and other ADLS. He does not walk. He does not sit up independently. Filemon is comfortable with a hospice, palliative approach but would like to talk with pt's children before making decision for comfort care as he knows pt will stop his heart medication. Filemon requests for [facility] to assist with [facility] assessment for Hospice support for pt at discharge back to [facility]. Filemon states not to take pt to ICU, or offer pressors. IV or oral medications to help with heart rhythm are ok. Filemon understands that pt will be discharged back to [facility] when MDs feel that his UTI is improving and he is stable with his heart rhythm and blood pressure. POLST from 10/10/2014 is still accurate and [facility] has a copy of this.  5/25/17 Per [facility], pt has a guardian. [facility] has been attempting to obtain contact information and documents for much of the day.  Was contacted by shahid Calvert at 3:30 pm today. Per guardian, Pt and his wife were both residents of [facility]. Wife passed away 6 months ago in Hospice. [facility] at [facility] had recommended hospice 9-10 months ago for pt, however, he did not meet criteria at that time.  Guardian states pt's baseline currently is that he is verbal, but forgetful and no short term memory. Pt is in bed when guardian sees him  although [facility] staff state he gets up to a WC. Pt is incontinent of bowel. Has indwelling catheter. Requires help for cares and is fed. Guardian believes that pt is comfort care per his most recent POLST. Have requested a copy. Guardian works M-F, and only is available until 1pm on Friday. Have requested a care conference with Guard[place]elle, [facility], Dr. Man for 11:30am to review most recent POLST, pt condition, and establish POC. If pt now meets HOSPICE criteria, guardian reports that [facility] is the provider that would be selected.     Patient has decision-making capacity Unreliable  Patient has a court-appointed guardian/conservator for healthcare decisions in a legal document . See name(s) and contact information in Health Care Agent/Legal Guardian section below.     Name: Filemon Calvert, Relationship: guardian, Phone(s): [phone]. Emergency number for weekends and holidays [phone].  Appeciate [facility] help obtaining legal guardian documents.        Patient has a completed health care directive available in the chart (Y/N): N  Physician orders for life-sustaining treatment (POLST) form is on file dated 10/10/14.  Have confirmed that this is the most updated POLST with sahhid Colbert.  Code Status:                     Do not resusitate / Do not intubate     Oxana BARRIGA, CNS  Pain Management and Palliative Care  [facility]  Pgr: [phone]    Time Spent on this Encounter   I spent[JW1.1] 15[JW1.3] minutes in assessment of the patient and discussion with the patient and family. Another[JW1.1] 10[JW1.3] minutes in review of chart, documentation and discussion with the health care team.      Interval History[JW1.1]   Pt awakes to name. More alert. Appears [place]. Squeezed my hands to command. More verbal. Nurses report pt having more[JW1.4] coughing after eating[JW1.3]. ST decreased dysphagia diet. Ortiz draining light yellow urine. Pedal edema less.[JW1.4]    Review of  "Systems[JW1.1]    Pain - nurses feel controlled with current pain regimen.  Agitation - pt drowsy, cooperative with simple commands, more verbal. No longer requiring an attendant at bedside.[JW1.4]  Constipation[JW1.3]      Palliative Symptom Review (0=no symptom/no concern, 1=mild, 2=moderate, 3=severe):        Pain -[JW1.1] denies at present.[JW1.4]   Agitation[JW1.1] - calm at present.[JW1.4]        Physical Exam[JW1.5]   Temp:  [97.2  F (36.2  C)-98.4  F (36.9  C)] 98.4  F (36.9  C)  Heart Rate:  [] 77  Resp:  [16-20] 20  BP: (137-179)/(61-92) 137/61  SpO2:  [97 %-98 %] 97 %  168 lbs 4.8 oz  GEN:[JW1.1]  Awakens to voice[JW1.4].[JW1.1] Opens eyes. Short sentences and responses.[JW1.4]  HEENT:  Normocephalic/atraumatic, no scleral icterus, no nasal discharge, mouth moist.  CV:[JW1.1]  Occas irreg beat, 70's[JW1.3], S1, S2; .  +3 DP/PT pulses bilatererally;[JW1.1] 1[JW1.4]+ edema BLE.  RESP:  Diminished bilat anteriorly.  Symmetric chest rise on inhalation noted.  Normal respiratory effort.  ABD:  Rounded, soft, non-tender/non-distended.  +BS  EXT:  Edema & pulses as noted above.  Pale.  M/S:   Deferred.  SKIN:  Dry to touch, no exanthems noted in the visualized areas.    NEURO: Symmetric strength +5/5.   Psych:[JW1.1]  Calm. Smiles. \"Tired\".[JW1.4]    Medications[JW1.1]        amiodarone  400 mg Oral Daily     hydrALAZINE  25 mg Oral Q8H LOLY     isosorbide dinitrate  10 mg Oral TID     oxyCODONE (ROXICODONE) IR half-tab 2.5 mg  2.5 mg Oral 4x Daily     LORazepam  0.26-0.5 mg Oral BID     sodium chloride (PF)  3 mL Intracatheter Q8H     amLODIPine  10 mg Oral Daily     pantoprazole  40 mg Oral QAM     cefTRIAXone  1 g Intravenous Q24H     senna-docusate  1-2 tablet Oral BID     acetaminophen (TYLENOL) tablet 1,000 mg  1,000 mg Oral TID     polyethylene glycol  17 g Oral Daily       Data[JW1.5]   No results found for this or any previous visit (from the past 24 hour(s)).[JW1.1]     Revision History        " User Key Date/Time User Provider Type Action    > JW1.2 5/30/2017  5:45 PM Oxana Garza APRN CNS Clinical Nurse Specialist Sign     JW1.3 5/30/2017  3:20 PM Oxana Garza APRN CNS Clinical Nurse Specialist      JW1.4 5/30/2017  3:13 PM Oxana Garza APRN CNS Clinical Nurse Specialist      JW1.5 5/30/2017  1:52 PM Oxana Garza APRN CNS Clinical Nurse Specialist      JW1.1 5/30/2017  1:49 PM Oxana Garza APRN CNS Clinical Nurse Specialist             Progress Notes by Stanislaw Franco MD at 5/30/2017  2:13 PM     Author:  Stanislaw Franco MD Service:  Hospitalist Author Type:  Physician    Filed:  5/30/2017  2:17 PM Date of Service:  5/30/2017  2:13 PM Creation Time:  5/30/2017  2:13 PM    Status:  Signed :  Stanislaw Franco MD (Physician)         [facility]  Hospitalist Progress Note  Stanislaw Franco MD 05/30/2017    Reason for Stay (Diagnosis): Bradycardia         Assessment and Plan:      Summary of Stay: Edil Lopez is a 85 year old male admitted on 5/22/2017 with need for rutledge replacement by urology, DWAIN and junctional bradycardia in the 30s.  Found to have E coli UTI.  Hx of dementia, HTN, CKD.        Problem List:       1. Tachy/Jw syndrome.  Cardiology input appreciated. On  Amiodarone cardiology following   Continue telemetry monitoring.  2. UTI.  On ceftriaxone since 5/24.  Urine culture with two strains of E coli both of which are resistant to levofloxacin.  Will plan for 7 days of antibiotics in total, with continued IV while inpatient.  Today's dose will be day 7, will d/c abx   3. Urine retention.  Chronic.  Appreciate Urology input.  Rutledge cath in place.    4. Dysphagia.  Speech path consulting but patient had difficulty cooperating due to dementia.  Continue Dysphagia level 1 diet for now.Still main concern and patient is at risk of recurrent aspiration and pneumonia   5. Acute on chronic kidney failure.  Likely due to obstruction.    Creatinine normal  and back to baseline.  Avoid nephrotoxins as much as possible.  6. Hypernatremia.: Improved , monitor .   7. Hypokalemia.  Potassium replacement protocol.  8. GERD.  Protonix.  TUMS PRN.  9. HTN.  Continue Norvasc, Hydralazine, and Isordil.  10. Chronic pain syndrome.  Pain team following.    11. Dementia.  Chronic.  12. General goal of care: Patient may benefit from hospice care and palliative approach than restorative care.I spoke with TLC team and to be discussed with family           DVT Prophylaxis: Pneumatic Compression Devices  Code Status: DNR / DNI  Discharge Dispo: LTC  After seen by hospice team . Patient is at high risk of readmission and will get care coordination team consult as well           Interval History (Subjective):      No new changes , no complaints   Patient had difficulty with swallowing and Speech therapy to se ritter again                     Physical Exam:      Last Vital Signs:  /61 (BP Location: Right arm)  Pulse 100  Temp 98.4  F (36.9  C) (Axillary)  Resp 20  Wt 76.3 kg (168 lb 4.8 oz)  SpO2 97%  BMI 27.16 kg/m2    Gen:  NAD, A&Ox1 to self.  Trouble with place and time.  Eyes:  PERRL, sclera anicteric.  OP:  MMM, no lesions.  Neck:  Supple.  CV:  Irregular, no murmurs.  Lung:  CTA b/l, normal effort.  Ab:  +BS, soft.  Skin:  Warm, dry to touch.  No rash.  Ext:  Mild non pitting edema LE b/l.           Medications:      All current medications were reviewed with changes reflected in problem list.         Data:      All new lab and imaging data was reviewed.   Labs:    Recent Labs  Lab 05/29/17  0654 05/28/17  0620   NA  --  146*   POTASSIUM 4.2 3.5   CHLORIDE  --  118*   CO2  --  20   ANIONGAP  --  8   GLC  --  117*   BUN  --  19   CR  --  0.88   GFRESTIMATED  --  82   GFRESTBLACK  --  >90African American GFR Calc   JENNA  --  9.5       Recent Labs  Lab 05/26/17  0455 05/25/17  0640   WBC 12.0* 16.9*   HGB  --  9.5*   HCT  --  30.7*   MCV  --  97   PLT   "--  235      Imaging:   No results found for this or any previous visit (from the past 24 hour(s)).[MA1.1]       Revision History        User Key Date/Time User Provider Type Action    > MA1.1 5/30/2017  2:17 PM Stanislaw Franco MD Physician Sign            Progress Notes by Maria Dolores Davis RD, LD at 5/30/2017 10:05 AM     Author:  Maria Dolores Davis RD, LD Service:  Nutrition Author Type:  Registered Dietitian    Filed:  5/30/2017 12:06 PM Date of Service:  5/30/2017 10:05 AM Creation Time:  5/30/2017 10:05 AM    Status:  Signed :  Maria Dolores Davis RD, LD (Registered Dietitian)         CLINICAL NUTRITION SERVICES  -  ASSESSMENT NOTE    REASON FOR ASSESSMENT  Edil Lopez is a 85 year old male seen by the dietitian for LOS    NUTRITION HISTORY  - Information obtained from chart review - unable to obtain diet hx from patient  - Patient is on a[RP1.1] ?regular vs dysphagia[RP1.2] diet at home.  - Food allergies/intolerances: NKFA[RP1.1]  - Hx of dementia[RP1.2]     CURRENT NUTRITION ORDERS  - Diet:[RP1.1] DD1 with thin liquids per SLP[RP1.2]  - Supplement:[RP1.1]  none[RP1.2]  Current Intake/Tolerance:  - Per flow sheet review,[RP1.1] [RP1.2]% intake for documented meals.  - Factors affecting nutrition intake include:[RP1.1] weakness, dysphagia, coughing[RP1.2]    PHYSICAL FINDINGS  Observed[RP1.1]  Mild to moderate temporal wasting, shoulder/acromion area looks square, scapular pronounced. Hollow upper orbital area with loose skin and dark circles. Upper arm region with loose skin, some pinch depth[RP1.2]  Obtained from Chart/Interdisciplinary Team  BM:[RP1.1] 5/29  Total feed[RP1.2]  Fluid status: +[RP1.1]1 L/R foot[RP1.2] edema    ANTHROPOMETRICS  Height:[RP1.1] 5'6\"[RP1.2]  Weight:[RP1.1] 76.3[RP1.2] kg   Body mass index is 27.16 kg/(m^2).  Weight Status:[RP1.1]  Overweight BMI 25-29.9[RP1.2]  IBW:[RP1.1] 64.5[RP1.2] kg +/- 10%  % IBW:[RP1.1]  118%[RP1.2]  Weight History:[RP1.1]  Weight has " increased, as noted below[RP1.2]  Wt Readings from Last 10 Encounters:   05/30/17 76.3 kg (168 lb 4.8 oz)   10/28/16 65.8 kg (145 lb)   12/03/14 68 kg (150 lb)       LABS  Labs reviewed    MEDICATIONS  Medications reviewed    ASSESSED NUTRITION NEEDS (PER APPROVED PRACTICE GUIDELINES, Dosing weight:[RP1.1] 76.3[RP1.2] kg):  Estimated Energy Needs:[RP1.1] 6471-0724[RP1.2] kcals ([RP1.1]25-30 Kcal/Kg[RP1.2])  Justification:[RP1.1] maintenance[RP1.2]  Estimated Protein Needs:[RP1.1] 76-92[RP1.2] grams protein ([RP1.1]1.2-1.5 g pro/Kg[RP1.2])  Justification:[RP1.1] CKD and preservation of lean body mass[RP1.2]  Estimated Fluid Needs: >1 mL/Kcal  Justification: maintenance    MALNUTRITION:  % Weight Loss:[RP1.1]None noted[RP1.2]  % Intake:[RP1.1]Decreased intake does not meet criteria for malnutrition[RP1.2]   Subcutaneous Fat Loss:[RP1.1] None noted[RP1.2]   Muscle Loss:[RP1.1] Mild to moderate, as noted above[RP1.2]  Fluid Retention:[RP1.1] Trace, does not meet criteria[RP1.2]    Malnutrition Diagnosis: Patient does not meet two of the above criteria necessary for diagnosing malnutrition     NUTRITION DIAGNOSIS:[RP1.1]  Swallowing Difficulty[RP1.2]  related to[RP1.1] disease progression[RP1.2] as evidenced by[RP1.1] total feeding assistance, DD1 and coughing with intake noted.[RP1.2]    INTERVENTIONS  Recommendations / Nutrition Prescription  Diet consistency/texture per SLP[RP1.1] +[RP1.2] Diet appropriate oral nutrition supplements to increase calorie and protein intake[RP1.1]    Palliative involved, consideration for comfort cares[RP1.2]    Implementation  Nutrition education: Not appropriate at this time due to patient condition  Medical food supplement: Magic cup with meals  Collaboration and Referral of care: Discussed patient during interdisciplinary care rounds this morning    Goals  Patient to consume >/= 50% of meals TID with no s/s of aspiration    MONITORING AND EVALUATION:  Progress towards goals will be  monitored and evaluated per protocol and Practice Guidelines      TIMMY Ling  3rd floor/ICU: [phone]  All other floors: [phone]  Weekend/holiday: [phone]  Office: [phone][RP1.1]     Revision History        User Key Date/Time User Provider Type Action    > RP1.2 5/30/2017 12:06 PM Maria Dolores Davis RD, LD Registered Dietitian Sign     RP1.1 5/30/2017 10:05 AM Maria Dolores Davis RD, LD Registered Dietitian             Progress Notes by Daron Cardoso PA-C at 5/30/2017 11:04 AM     Author:  Daron Cardoso PA-C Service:  Cardiology Author Type:  Physician Assistant - FARHAD    Filed:  5/30/2017 11:21 AM Date of Service:  5/30/2017 11:04 AM Creation Time:  5/30/2017 11:04 AM    Status:  Attested :  Daron Cardoso PA-C (Physician Assistant - C)    Cosigner:  Aniceto Hagen MD at 5/30/2017 12:00 PM        Attestation signed by Aniceto Hagen MD at 5/30/2017 12:00 PM        Physician Attestation      I agree with the information in this note.    Aniceto Hagen                               [facility]    Cardiology Progress Note    SUBJECTIVE:[AS1.1] Sleeping[AS1.2]    MEDICATIONS:  Current Facility-Administered Medications   Medication     amiodarone (PACERONE/CODARONE) tablet 400 mg     potassium chloride SA (K-DUR/KLOR-CON M) CR tablet 20-40 mEq     potassium chloride (KLOR-CON) Packet 20-40 mEq     potassium chloride 10 mEq in 100 mL intermittent infusion     potassium chloride 10 mEq in 100 mL intermittent infusion with 10 mg lidocaine     potassium chloride 20 mEq in 50 mL intermittent infusion     hydrALAZINE (APRESOLINE) tablet 25 mg     isosorbide dinitrate (ISORDIL) tablet 10 mg     oxyCODONE (ROXICODONE) IR half-tab 2.5 mg     LORazepam (ATIVAN) 2 MG/ML (HIGH CONC) solution 0.26-0.5 mg     LORazepam (ATIVAN) 2 MG/ML (HIGH CONC) solution 0.26 mg     sodium chloride (PF) 0.9% PF flush 3 mL     sodium chloride (PF) 0.9% PF flush 3 mL     HYDROmorphone (DILAUDID)  injection 0.2 mg     amLODIPine (NORVASC) tablet 10 mg     calcium carbonate (TUMS) chewable tablet 1,000 mg     pantoprazole (PROTONIX) EC tablet 40 mg     bisacodyl (DULCOLAX) Suppository 10 mg     polyethylene glycol (MIRALAX/GLYCOLAX) Packet 17 g     cefTRIAXone (ROCEPHIN) 1 g vial to attach to  mL bag for ADULTS or NS 50 mL bag for PEDS     naloxone (NARCAN) injection 0.1-0.4 mg     senna-docusate (SENOKOT-S;PERICOLACE) 8.6-50 MG per tablet 1-2 tablet     acetaminophen (TYLENOL) tablet 1,000 mg     polyethylene glycol (MIRALAX/GLYCOLAX) Packet 17 g       ALLERGIES:  Allergies   Allergen Reactions     Lisinopril      Penicillins        PHYSICAL EXAM:  Temp: 98.4  F (36.9  C) Temp src: Axillary BP: (!) 172/92   Heart Rate: 101 Resp: 20 SpO2: 97 % O2 Device: None (Room air)    Vital Signs with Ranges  Temp:  [97.2  F (36.2  C)-98.4  F (36.9  C)] 98.4  F (36.9  C)  Heart Rate:  [] 101  Resp:  [16-20] 20  BP: (143-179)/(66-92) 172/92  SpO2:  [94 %-98 %] 97 %  168 lbs 4.8 oz    Constitutional: no distress[AS1.1], sleeping[AS1.2]  Eyes:[AS1.1] sleeping, eyes closed[AS1.2]  Respiratory: clear  Cardiovascular:[AS1.1] irr, S1S2, no edema[AS1.2]  GI: nondistended, nontender, bowel sounds present  Lymph/Hematologic: no lymphadenopathy  Skin: dry, no rash  Musculoskeletal: good muscle tone  Neurologic: no focal deficits      Tele:  A[AS1.1]fib/a[AS1.2]flutter rates 70s, up to 120s when anxious, but came back down to 70s with Ativan. Occasional PVCs.     Echo 5/24/17: EF 60%, mild RVE with low-normal function, mod-sev TR, RVSP 45mmHg, mild AI      ASSESSMENT:  85-year-old male  With history of at least moderate dementia, history of urinary retention and had a supra pubic catheter. This became dislodged which is why he presented to the emergency department for this issue.  He was found to be in a junctional rhythm with heart rate around 40, blood pressure was 160/100, there was no evidence of hypo-perfusion.   There is no reported lightheadedness, dizziness, or syncope. Cardiology consulted.   He had been on Toprol 200 mg b.i.d. since at least 2014, help since admission on May 22. 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 No significant pauses since admission.      RECOMMENDATIONS:  1.  A[AS1.1]fib/af[AS1.2]lutter with RVR[AS1.1] and junctional rhythm c/w[AS1.2] tachy/kelly syndrome -[AS1.1]  Was on high dose Toprol XL which has been discontinued.[AS1.2] Using amiodarone for rate control, seems to have reasonable rate control without causing significant bradycardia. Plan 400mg daily x 1 week, then 200 mg daily thereafter. The patient is not a good AC candidate.  Note plans for Palliative Care team to revisit. If he is Palliative/hospice, would continue with amiodarone for rate control. If not, and family wants to be aggressive, we would need to discuss possible PPM implantation (though not a good candidate).       Daron Cardoso PA-C[AS1.1]         Revision History        User Key Date/Time User Provider Type Action    > AS1.2 5/30/2017 11:21 AM Daron Cardoso PA-C Physician Assistant - FARHAD Sign     AS1.1 5/30/2017 11:04 AM Daron Cardoso PA-C Physician Assistant - C             Progress Notes by Stanislaw Franco MD at 5/30/2017  9:53 AM     Author:  Stanislaw Franco MD Service:  Hospitalist Author Type:  Physician    Filed:  5/30/2017  9:57 AM Date of Service:  5/30/2017  9:53 AM Creation Time:  5/30/2017  9:53 AM    Status:  Signed :  Stanislaw Franco MD (Physician)         Patient seen and examined by me this morning.  Edil is a 85-year-old male with multiple medical problems including dementia and urinary retention admitted and currently treated for urinary tract infection and generalized weakness.  He has tachybradycardia syndrome for which amiodarone was started.  Patient's condition is not significantly improved and he recommended total care and given multiple medical  problems patient may benefit from palliative approach including hospice care.  TLC team will be reconsulted to discuss with family if hospice care is acceptable to them.  He needs a long-term facility placement.  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  He has also left upper extremity swelling and tenderness likely from phlebitis from IV access which needs supportive care.[MA1.1]     Revision History        User Key Date/Time User Provider Type Action    > MA1.1 5/30/2017  9:57 AM Stanislaw Franco MD Physician Sign            Progress Notes by Stanislaw Franco MD at 5/29/2017  2:10 PM     Author:  Stanislaw Franco MD Service:  Hospitalist Author Type:  Physician    Filed:  5/29/2017  2:13 PM Date of Service:  5/29/2017  2:10 PM Creation Time:  5/29/2017  2:10 PM    Status:  Signed :  Stanislaw Franco MD (Physician)         [facility]  Hospitalist Progress Note  Stanislaw Franco MD 05/29/2017    Reason for Stay (Diagnosis): Bradycardia         Assessment and Plan:      Summary of Stay: Edil Lopez is a 85 year old male admitted on 5/22/2017 with need for rutledge replacement by urology, DWAIN and junctional bradycardia in the 30s.  Found to have E coli UTI.  Hx of dementia, HTN, CKD.        Problem List:       1. Tachy/Jw syndrome.  Cardiology input appreciated. On  Amiodarone drip.cardiology following   Continue telemetry monitoring.  2. UTI.  On ceftriaxone since 5/24.  Urine culture with two strains of E coli both of which are resistant to levofloxacin.  Will plan for 7 days of antibiotics in total, with continued IV while inpatient.  Today's dose will be day 6.  3. Urine retention.  Chronic.  Appreciate Urology input.  Rutledge cath in place.    4. Dysphagia.  Speech path consulting but patient had difficulty cooperating due to dementia.  Continue Dysphagia level 1 diet for now.  5. Acute on chronic kidney failure.  Likely due to obstruction.    Creatinine normal ay and back to baseline.  Avoid nephrotoxins as much as possible.  6. Hypernatremia.: Improved , monitor .   7. Hypokalemia.  Potassium replacement protocol.  8. GERD.  Protonix.  TUMS PRN.  9. HTN.  Continue Norvasc, Hydralazine, and Isordil.  10. Chronic pain syndrome.  Pain team following.    11. Dementia.  Chronic.          DVT Prophylaxis: Pneumatic Compression Devices  Code Status: DNR / DNI  Discharge Dispo: LTC in  Two  days        Interval History (Subjective):      Patient seen and examined by me this morning.  He is a 85-year-old male with multiple medical problems including dementia, chronic kidney disease and currently having tachybradycardia syndrome on amiodarone drip.  Patient is also on ceftriaxone for UTI.  Patient is very weak and confused requiring total care.                  Physical Exam:      Last Vital Signs:  /79 (BP Location: Right arm)  Pulse 100  Temp 98.3  F (36.8  C) (Oral)  Resp 16  Wt 76.6 kg (168 lb 12.8 oz)  SpO2 94%  BMI 27.25 kg/m2    Gen:  NAD, A&Ox1 to self.  Trouble with place and time.  Eyes:  PERRL, sclera anicteric.  OP:  MMM, no lesions.  Neck:  Supple.  CV:  Irregular, no murmurs.  Lung:  CTA b/l, normal effort.  Ab:  +BS, soft.  Skin:  Warm, dry to touch.  No rash.  Ext:  Mild non pitting edema LE b/l.           Medications:      All current medications were reviewed with changes reflected in problem list.         Data:      All new lab and imaging data was reviewed.   Labs:    Recent Labs  Lab 05/29/17  0654 05/28/17  0620   NA  --  146*   POTASSIUM 4.2 3.5   CHLORIDE  --  118*   CO2  --  20   ANIONGAP  --  8   GLC  --  117*   BUN  --  19   CR  --  0.88   GFRESTIMATED  --  82   GFRESTBLACK  --  >90African American GFR Calc   JENNA  --  9.5       Recent Labs  Lab 05/26/17  0455 05/25/17  0640   WBC 12.0* 16.9*   HGB  --  9.5*   HCT  --  30.7*   MCV  --  97   PLT  --  235      Imaging:   No results found for this or any previous visit (from  the past 24 hour(s)).[MA1.1]       Revision History        User Key Date/Time User Provider Type Action    > MA1.1 5/29/2017  2:13 PM Stanislaw Franco MD Physician Sign            Progress Notes by Aniceto Hagen MD at 5/29/2017  1:37 PM     Author:  Aniceto Hagen MD Service:  Cardiology Author Type:  Physician    Filed:  5/29/2017  1:38 PM Date of Service:  5/29/2017  1:37 PM Creation Time:  5/29/2017  1:37 PM    Status:  Signed :  Aniceto Hagen MD (Physician)         Cardiology follow up note:    No new recommendations at this time.  I will plan to change the patient to PO amiodarone which appears to be controlling his HR reasonably well without significant bradycardia.    Aniceto Hagen MD[RD1.1]       Revision History        User Key Date/Time User Provider Type Action    > RD1.1 5/29/2017  1:38 PM Aniecto Hagen MD Physician Sign            Progress Notes by Stanislaw Franco MD at 5/29/2017 10:11 AM     Author:  Stanislaw Franco MD Service:  Hospitalist Author Type:  Physician    Filed:  5/29/2017 10:12 AM Date of Service:  5/29/2017 10:11 AM Creation Time:  5/29/2017 10:11 AM    Status:  Signed :  Stanislaw Franco MD (Physician)         Patient seen and examined by me this morning.  He is a 85-year-old male with multiple medical problems including dementia, chronic kidney disease and currently having tachybradycardia syndrome on amiodarone drip.  Patient is also on ceftriaxone for UTI.  Patient is very weak and confused requiring total care.  Plan is to continue to monitor, cardiology to follow patient and recommend medications on discharge.  Discharge planning.[MA1.1]     Revision History        User Key Date/Time User Provider Type Action    > MA1.1 5/29/2017 10:12 AM Stanislaw Franco MD Physician Sign                  Procedure Notes     No notes of this type exist for this encounter.      Progress Notes - Therapies (Notes from 05/29/17 through 06/01/17)     No notes of this type  exist for this encounter.

## 2017-05-22 NOTE — PLAN OF CARE
Problem: Discharge Planning  Goal: Discharge Planning (Adult, OB, Behavioral, Peds)  PRIMARY DIAGNOSIS: Unable to place catheter, Bradycardia  OUTPATIENT/OBSERVATION GOALS TO BE MET BEFORE DISCHARGE:     1. Orthostatic symptoms (BP decrease or HR increase with patient upright)?  N/A  2. Vital Signs stable? HR in 30-40s  3. Documented urine output: No  4. Tolerating PO fluid: NPO  5. Symptoms improved: No  6. Labs WNL? No  7. ADLs back to baseline?  Yes  8. Activity and level of assistance: Total assist, lift - baseline  9. Pain status: Improved-controlled with oral pain medications.  10. Barriers to discharge noted No     Interpretation of rhythm per telemetry tech: SR 30-40s prolonged QT with 1st AVB     Alert to self only, at baseline.  From [facility].  Per RN there, patient is total assist and uses lift for transfers.  Eats a mechanical soft, thin liquids, no straws diet at baseline.  However, keep NPO and hold IVF until catheter placed per urology.  Pain 7/10 per PAINAD scale.  Scheduled oxycodone given.  Patient states the pain is in his penis.  Meatus bloody, red, tender.  Pericare done.  Occasionally yelling out, but unable to articulate needs.  Per NH RN, this is baseline for patient.  Scheduled ativan given.  Scabs/bruises on BLE and BUE.  Orange size neck mass on posterior R side.  This is not new per NH RN.  Q 2 hr turns.  Buttocks red, but blanchable.  Waiting for urologist to place catheter.  Urology cart outside of room.  HR in 30-40s, asymptomatic.  However, occasionally has labored/huffing breaths, but this appears related to frustration.  Will continue to monitor.

## 2017-05-22 NOTE — ED NOTES
Pt to ER with c/o inability of staff to place a new catheter. Pt from [facility]

## 2017-05-22 NOTE — IP AVS SNAPSHOT
[place] [address] [facility]    [address]    [facility] [address]    Phone:  [phone]    Fax:  [phone]                                       After Visit Summary   5/22/2017    Edil Lopez    MRN: [id number]           After Visit Summary Signature Page     I have received my discharge instructions, and my questions have been answered. I have discussed any challenges I see with this plan with the nurse or doctor.    ..........................................................................................................................................  Patient/Patient Representative Signature      ..........................................................................................................................................  Patient Representative Print Name and Relationship to Patient    ..................................................               ................................................  Date                                            Time    ..........................................................................................................................................  Reviewed by Signature/Title    ...................................................              ..............................................  Date                                                            Time

## 2017-05-22 NOTE — PHARMACY-ADMISSION MEDICATION HISTORY
Admission medication history interview status for this patient is complete. See [facility] admission navigator for allergy information, prior to admission medications and immunization status.     Medication history interview source(s): none - med list from [facility]  Medication history resources (including written lists, pill bottles, clinic record):med list from [facility]  Primary pharmacy: [facility]    Changes made to PTA medication list:  Added: all medications  Deleted: none  Changed: none    Actions taken by pharmacist (provider contacted, etc):None     Additional medication history information:None    Medication reconciliation/reorder completed by provider prior to medication history? No    For patients on insulin therapy: No (Yes/No)  Lantus/levemir/NPH/Mix 70/30 dose:  _____   in AM/PM  or twice daily   Sliding scale Novolog Y/N  If Yes, do you have a baseline novolog pre-meal dose:  ______units with meals   Patients eat three meals a day:   Y/N     Any Barriers to therapy:  cost of medications/comfortable with giving injections (if applicable)/ comfortable and confident with current diabetes regimen       Prior to Admission medications    Medication Sig Last Dose Taking? Auth Provider   sennosides (SENOKOT) 8.6 MG tablet Take 2 tablets by mouth every 12 hours as needed for constipation  Yes Unknown, Entered By History   LORazepam (LORAZEPAM INTENSOL) 2 MG/ML (HIGH CONC) solution Take 0.5 mg by mouth every 4 hours as needed for anxiety  Yes Unknown, Entered By History   OXYCODONE HCL PO Take 5 mg by mouth every 4 hours as needed  Yes Unknown, Entered By History   magnesium hydroxide (MILK OF MAGNESIA) 400 MG/5ML suspension Take 30 mLs by mouth daily as needed for constipation or heartburn  Yes Unknown, Entered By History   Acetaminophen (TYLENOL PO) Take 1,000 mg by mouth 3 times daily 0800, 1200, 1800 5/21/2017 at Unknown time Yes Unknown, Entered By History   METOPROLOL SUCCINATE ER PO Take 200 mg by mouth 2  times daily Hold & call MD if AP <60 pr SBP <100 5/21/2017 at Unknown time Yes Unknown, Entered By History   RANITIDINE HCL PO Take 75 mg by mouth 2 times daily 5/21/2017 at Unknown time Yes Unknown, Entered By History   sennosides (SENOKOT) 8.6 MG tablet Take 2 tablets by mouth At Bedtime 5/21/2017 at Unknown time Yes Unknown, Entered By History   senna-docusate (SENOKOT-S;PERICOLACE) 8.6-50 MG per tablet Take 2 tablets by mouth daily 5/21/2017 at Unknown time Yes Unknown, Entered By History   polyethylene glycol (MIRALAX/GLYCOLAX) Packet Take 17 g by mouth daily 5/21/2017 at Unknown time Yes Unknown, Entered By History   LORazepam (LORAZEPAM INTENSOL) 2 MG/ML (HIGH CONC) solution Take 0.5 mg by mouth 2 times daily 5/21/2017 at Unknown time Yes Unknown, Entered By History   OXYCODONE HCL PO Take 5 mg by mouth 4 times daily 0100, 0800, 1400, 2000 5/21/2017 at Unknown time Yes Unknown, Entered By History

## 2017-05-22 NOTE — PROGRESS NOTES
ROOM # 223    Living Situation (if not independent, order SW consult): Nursing Home  Facility name: [facility]  : Brooke, wife.  See demographics.    Activity level at baseline: Lift, total care  Activity level on admit: Lift, total care      Patient registered to observation; given Patient Bill of Rights; given the opportunity to ask questions about observation status and their plan of care.  Patient has been oriented to the observation room, bathroom and call light is in place.    Discussed discharge goals and expectations with patient/family.

## 2017-05-22 NOTE — IP AVS SNAPSHOT
"` `           [place] [address] [facility]: [phone]                                              INTERAGENCY TRANSFER FORM - NURSING   2017                    Hospital Admission Date: 2017  KEELY GUILLEN   : 1931  Sex: Male        Attending Provider: Hao Suárez MD     Allergies:  Lisinopril, Penicillins    Infection:  None   Service:  HOSPITALIST    Ht:  1.676 m (5' 6\")   Wt:  76.3 kg (168 lb 4.8 oz)   Admission Wt:  75.4 kg (166 lb 3.2 oz)    BMI:  27.16 kg/m 2   BSA:  1.88 m 2            Patient PCP Information     Provider PCP Type    Shirley Toscano MD General      Current Code Status     Date Active Code Status Order ID Comments User Context       Prior      Code Status History     Date Active Date Inactive Code Status Order ID Comments User Context    2017 10:16 AM  DNR/DNI [id number]  Stanislaw Franco MD Outpatient    2017  2:04 PM 2017 10:16 AM DNR/DNI [id number]  Oxana Garza APRN CNS Inpatient    2017  6:16 PM 2017  2:04 PM Special Code [id number] Parameters:  DNR/DNI. No ICU. No pressors. IV and Oral meds to help with pt cardiac rhythm ok. See palliative care note 17 Oxana Garza APRN CNS Inpatient    2017  6:15 PM 2017  6:16 PM Special Code [id number] Parameters:  DNR/DNI. No ICU. No pressors. IV and Oral meds to help with pt cardiac rhythm ok. Oxana Garza APRN CNS Inpatient    2017 12:10 PM 2017  6:15 PM DNR/DNI [id number]  Kat Quiroga PA-C Inpatient      Advance Directives        Does patient have a scanned Advance Directive/ACP document in EPIC?           No        Hospital Problems as of 2017              Priority Class Noted POA    Bradycardia Medium  2017 Yes    Renal failure Medium  2017 Yes      Non-Hospital Problems as of 2017              Priority Class Noted    ACP (advance care planning)   2014      Immunizations     None         END      ASSESSMENT     " "Discharge Profile Flowsheet     EXPECTED DISCHARGE     COMMUNICATION ASSESSMENT      Expected Discharge Date  06/01/17 (OVER<DRG 2<[facility]) 05/31/17 1441   Patient's communication style  spoken language (English or Bilingual) 04/27/17 1318    DISCHARGE NEEDS ASSESSMENT     FINAL RESOURCES      Concerns To Be Addressed  all concerns addressed in this encounter 05/31/17 1453   Resources List  Hospice 05/31/17 1453    Patient/family verbalizes understanding of discharge plan recommendations?  Yes 05/31/17 1453   SKIN      Medical Team notified of plan?  yes 05/31/17 1453   Inspection  Full 06/01/17 1135    Anticipated Changes Related to Illness  none 05/31/17 1453   Skin WDL  ex 06/01/17 1135    # of Referrals Placed by CTS  Hospice 05/31/17 1453   Skin Color/Characteristics  redness blanchable (buttocks) 06/01/17 0512    Does Patient Need a Referral for Clinic CC  No 05/31/17 1453   Skin Temperature  warm 05/31/17 1722    GASTROINTESTINAL (ADULT,PEDIATRIC,OB)     Skin Moisture  flaky 05/31/17 1722    GI WDL  WDL 06/01/17 1135   Skin Integrity  bruise(s) 06/01/17 1135    Last Bowel Movement  06/01/17 06/01/17 1135   SAFETY      GI Signs/Symptoms  fecal incontinence 06/01/17 0512   Safety WDL  WDL 06/01/17 1135    Passing flatus  yes 06/01/17 0512   Safety Factors  ID band on 06/01/17 0751                 Assessment WDL (Within Defined Limits) Definitions           Safety WDL     Effective: 09/28/15    Row Information: <b>WDL Definition:</b> Bed in low position, wheels locked; call light in reach; upper side rails up x 2; ID band on<br> <font color=\"gray\"><i>Item=AS safety wdl>>List=AS safety wdl>>Version=F14</i></font>      Skin WDL     Effective: 09/28/15    Row Information: <b>WDL Definition:</b> Warm; dry; intact; elastic; without discoloration; pressure points without redness<br> <font color=\"gray\"><i>Item=AS skin wdl>>List=AS skin wdl>>Version=F14</i></font>      Vitals     Vital Signs Flowsheet     VITAL " SIGNS     PAINAD Consolability  1-->distracted or reassured by voice/touch 05/29/17 0413    Temp  97.1  F (36.2  C) 06/01/17 0751   PAINAD Score  2 05/29/17 0413    Temp src  Oral 06/01/17 0751   Pain Location  Wrist 06/01/17 1135    Resp  20 06/01/17 0751   Pain Orientation  Right 06/01/17 1135    Pulse  94 05/31/17 0831   Pain Descriptors  Burning;Discomfort 05/24/17 0813    Heart Rate  76 06/01/17 0751   Pain Management Interventions  analgesia administered 05/29/17 0356    Pulse/Heart Rate Source  Monitor 06/01/17 0751   Pain Intervention(s)  Medication (See eMAR) 06/01/17 1135    BP  170/83 06/01/17 0751   Response to Interventions  Decrease in pain 05/31/17 1024    BP Location  Right arm 06/01/17 0751   ANALGESIA SIDE EFFECTS MONITORING      OXYGEN THERAPY     Side Effects Monitoring: Respiratory Quality  R 05/31/17 0036    SpO2  97 % 06/01/17 0751   Side Effects Monitoring: Respiratory Depth  N 05/31/17 0036    O2 Device  None (Room air) 06/01/17 0751   Side Effects Monitoring: Sedation Level  1 05/31/17 0036    PAIN/COMFORT     HEIGHT AND WEIGHT      Patient Currently in Pain  yes 06/01/17 1135   Weight  76.3 kg (168 lb 4.8 oz) (bed scale) 05/30/17 0542    Preferred Pain Scale  number (Numeric Rating Pain Scale) 06/01/17 1135   POSITIONING      Patient's Stated Pain Goal  No pain 06/01/17 1135   Body Position  supine, head elevated 06/01/17 1135    0-10 Pain Scale  0 06/01/17 1253   Positioning/Transfer Devices  pillows;in use 05/31/17 2238    Word Pain Scale  4 05/25/17 0326   Head of Bed (HOB)  HOB at 20-30 degrees 06/01/17 1135    PAINAD Breathing  0-->normal 05/29/17 0413   DAILY CARE      PAINAD Negative Vocalization  0-->none 05/29/17 0413   Activity Type  activity adjusted per tolerance;activity encouraged 06/01/17 1135    PAINAD Facial Expression  0-->smiling or inexpressive 05/29/17 0413   Activity Level of Assistance  assistance, 2 people 06/01/17 1135    PAINAD Body Language  1-->tense: distressed  pacing, fidgeting 05/29/17 0413   Activity Assistive Device  mechanical lift 06/01/17 1135            Patient Lines/Drains/Airways Status    Active LINES/DRAINS/AIRWAYS     Name: Placement date: Placement time: Site: Days: Last dressing change:    Urethral Catheter Coude 16 fr 04/27/17   1600   Coude   34     Urethral Catheter Coude 16 fr 05/22/17   1800   Coude   9     Peripheral IV 05/30/17 Right Lower forearm 05/30/17   0232   Lower forearm   2             Patient Lines/Drains/Airways Status    Active PICC/CVC     None            Intake/Output Detail Report     Date Intake     Output Net    Shift P.O. I.V. IV Piggyback Total Urine Total       Noc 05/30/17 2300 - 05/31/17 0659 -- -- -- -- 1000 1000 -1000    Day 05/31/17 0700 - 05/31/17 1459 700 -- -- 700 450 450 250    Yi 05/31/17 1500 - 05/31/17 2259 -- 3 -- 3 400 400 -397    Noc 05/31/17 2300 - 06/01/17 0659 -- -- -- -- -- -- 0    Day 06/01/17 0700 - 06/01/17 1459 -- -- -- -- -- -- 0      Last Void/BM       Most Recent Value    Urine Occurrence     Stool Occurrence 1 at 06/01/2017 0337      Case Management/Discharge Planning     Case Management/Discharge Planning Flowsheet     REFERRAL INFORMATION     Who is your support system?  Guardian;Facility resident(s)/Staff 05/31/17 1453    Did the Initial Social Work Assessment result in a Social Work Case?  Yes 05/31/17 1451   Description of Support System  Supportive;Involved 05/31/17 1453    Admission Type  inpatient 05/31/17 1451   Support Assessment  Adequate family and caregiver support 05/31/17 1453    Arrived From  long-term care 05/31/17 1451   Quality of Family Relationships  unable to assess 05/31/17 1453    Referral Source  physician;nursing 05/31/17 1451   COPING/STRESS      New Steerage to  Clinics?  No 05/31/17 1451   Major Change/Loss/Stressor  none 05/24/17 0625    # of Referrals Placed by [facility]  Hospice 05/31/17 1453   EXPECTED DISCHARGE      Reason For Consult  discharge planning;end of life/hospice  05/31/17 1451   Expected Discharge Date  06/01/17 (OVER<DRG 2<[facility]) 05/31/17 1441    Record Reviewed  clinical discipline documentation;history and physical 05/31/17 1451   ASSESSMENT/CONCERNS TO BE ADDRESSED       Assigned to Case  Jessika Moore 05/31/17 1451   Concerns To Be Addressed  all concerns addressed in this encounter 05/31/17 1453    Primary Care Clinic Name  [facility] 05/31/17 1453   DISCHARGE PLANNING      Primary Care MD Name  Destineydar 05/31/17 1453   Patient/family verbalizes understanding of discharge plan recommendations?  Yes 05/31/17 1453    LIVING ENVIRONMENT     Medical Team notified of plan?  yes 05/31/17 1453    Lives With  facility resident 05/24/17 0148   Anticipated Changes Related to Illness  none 05/31/17 1453    Living Arrangements  extended care facility 05/31/17 1453   Does Patient Need a Referral for Clinic CC  No 05/31/17 1453    Provides Primary Care For  no one, unable/limited ability to care for self 05/31/17 1453   FINAL RESOURCES      Primary Care Provided By  other (see comments) (Facility staff) 05/31/17 1453   Resources List  Hospice 05/31/17 1453    Quality Of Family Relationships  unable to assess 05/31/17 1453   ABUSE RISK SCREEN      Able to Return to Prior Living Arrangements  yes 05/31/17 1453   QUESTION TO PATIENT:  Has a member of your family or a partner(now or in the past) intimidated, hurt, manipulated, or controlled you in any way?  no 05/22/17 0132    HOME SAFETY     QUESTION TO PATIENT: Do you feel safe going back to the place where you are living?  yes 05/22/17 0132    Patient Feels Safe Living in Home?  yes 05/31/17 1453   OBSERVATION: Is there reason to believe there has been maltreatment of a vulnerable adult (ie. Physical/Sexual/Emotional abuse, self neglect, lack of adequate food, shelter, medical care, or financial exploitation)?  no 05/22/17 0132    ASSESSMENT OF FAMILY/SOCIAL SUPPORT     (R) MENTAL HEALTH SUICIDE RISK      Marital  Status   05/31/17 4281   Are you depressed or being treated for depression?  No 05/22/17 8212

## 2017-05-22 NOTE — IP AVS SNAPSHOT
"[place] [address] MEDICAL SURGICAL: [phone]                                              INTERAGENCY TRANSFER FORM - PHYSICIAN ORDERS   2017                    Hospital Admission Date: 2017  KEELY GUILLEN   : 1931  Sex: Male        Attending Provider: Hao Suárez MD     Allergies:  Lisinopril, Penicillins    Infection:  None   Service:  HOSPITALIST    Ht:  1.676 m (5' 6\")   Wt:  76.3 kg (168 lb 4.8 oz)   Admission Wt:  75.4 kg (166 lb 3.2 oz)    BMI:  27.16 kg/m 2   BSA:  1.88 m 2            Patient PCP Information     Provider PCP Type    Shirley Toscano MD General      ED Clinical Impression     Diagnosis Description Comment Added By Time Added    Dislodged Ortiz catheter, initial encounter [T83.021A] Dislodged Ortiz catheter, initial encounter [T83.021A]  Kyle Orona MD 2017  3:41 AM    Chronic kidney disease, unspecified stage [N18.9] Chronic kidney disease, unspecified stage [N18.9]  Kyle Orona MD 2017  3:41 AM    Dementia without behavioral disturbance, unspecified dementia type [F03.90] Dementia without behavioral disturbance, unspecified dementia type [F03.90]  Kyle Orona MD 2017  3:41 AM      Hospital Problems as of 2017              Priority Class Noted POA    Bradycardia Medium  2017 Yes    Renal failure Medium  2017 Yes      Non-Hospital Problems as of 2017              Priority Class Noted    ACP (advance care planning)   2014      Code Status History     Date Active Date Inactive Code Status Order ID Comments User Context    2017 10:16 AM  DNR/DNI [id number]  Stanislaw Franco MD Outpatient    2017  2:04 PM 2017 10:16 AM DNR/DNI [id number]  Oxana Garza APRN CNS Inpatient    2017  6:16 PM 2017  2:04 PM Special Code [id number] Parameters:  DNR/DNI. No ICU. No pressors. IV and Oral meds to help with pt cardiac rhythm ok. See palliative care note 17 Oxana Garza, APRN CNS Inpatient    " 5/26/2017  6:15 PM 5/26/2017  6:16 PM Special Code [id number] Parameters:  DNR/DNI. No ICU. No pressors. IV and Oral meds to help with pt cardiac rhythm Oxana Quinonez APRN CNS Inpatient    5/22/2017 12:10 PM 5/26/2017  6:15 PM DNR/DNI [id number]  Kat Quiroga PA-C Inpatient         Medication Review      START taking        Dose / Directions Comments    amiodarone 200 MG tablet   Commonly known as:  PACERONE/CODARONE        Dose:  200 mg   Take 1 tablet (200 mg) by mouth daily   Quantity:  30 tablet   Refills:  0        amLODIPine 10 MG tablet   Commonly known as:  NORVASC        Dose:  10 mg   Take 1 tablet (10 mg) by mouth daily   Quantity:  60 tablet   Refills:  0        bisacodyl 10 MG Suppository   Commonly known as:  DULCOLAX   Used for:  Other chronic pain        Dose:  10 mg   Place 1 suppository (10 mg) rectally daily as needed for constipation   Quantity:  30 suppository   Refills:  0        calcium carbonate 500 MG chewable tablet   Commonly known as:  TUMS   Used for:  Gastroesophageal reflux disease, esophagitis presence not specified        Dose:  1 chew tab   Take 1 tablet (500 mg) by mouth 2 times daily as needed for heartburn   Refills:  0        diclofenac 1 % Gel topical gel   Commonly known as:  VOLTAREN   Used for:  Other chronic pain        Apply  2 grams to R antecubital and L wrist old IV sites four times daily using enclosed dosing card.   Quantity:  100 g   Refills:  0        hydrALAZINE 25 MG tablet   Commonly known as:  APRESOLINE   Used for:  Other chronic pain        Dose:  25 mg   Take 1 tablet (25 mg) by mouth every 8 hours   Quantity:  90 tablet   Refills:  0    HTN       pantoprazole Susp suspension   Commonly known as:  PROTONIX   Used for:  Gastroesophageal reflux disease, esophagitis presence not specified        Dose:  40 mg   Take 20 mLs (40 mg) by mouth every morning   Refills:  0          CONTINUE these medications which may have CHANGED, or have new  prescriptions. If we are uncertain of the size of tablets/capsules you have at home, strength may be listed as something that might have changed.        Dose / Directions Comments    acetaminophen 500 MG tablet   Commonly known as:  TYLENOL   This may have changed:    - medication strength  - additional instructions   Used for:  Other chronic pain        Dose:  1000 mg   Take 2 tablets (1,000 mg) by mouth 3 times daily 0800, 1400, 2200   Quantity:  1 Bottle   Refills:  0        * LORazepam 2 MG/ML (HIGH CONC) solution   Commonly known as:  LORazepam INTENSOL   This may have changed:    - how much to take  - additional instructions   Used for:  Agitation        Dose:  0.25-0.5 mg   Take 0.13-0.25 mLs (0.26-0.5 mg) by mouth 2 times daily See also prn dosing   Quantity:  30 mL   Refills:  0        * LORazepam 2 MG/ML (HIGH CONC) solution   Commonly known as:  ATIVAN   This may have changed:    - how much to take  - when to take this  - reasons to take this  - additional instructions   Used for:  Agitation        Dose:  0.25 mg   Take 0.13 mLs (0.26 mg) by mouth every 8 hours as needed See also scheduled doses. Hold for sedation.   Refills:  0        oxyCODONE 5 MG IR tablet   Commonly known as:  ROXICODONE   This may have changed:    - medication strength  - how much to take  - additional instructions  - Another medication with the same name was removed. Continue taking this medication, and follow the directions you see here.   Used for:  Other chronic pain        Dose:  2.5 mg   Take 0.5 tablets (2.5 mg) by mouth 4 times daily Hold for sedation   Quantity:  5 tablet   Refills:  0    Hold for sedation       polyethylene glycol Packet   Commonly known as:  MIRALAX/GLYCOLAX   This may have changed:  additional instructions   Used for:  Drug-induced constipation        Dose:  17 g   Take 17 g by mouth daily Hold for loose stools   Quantity:  7 packet   Refills:  0        senna-docusate 8.6-50 MG per tablet   Commonly  known as:  SENOKOT-S;PERICOLACE   This may have changed:  additional instructions   Used for:  Drug-induced constipation        Dose:  2 tablet   Take 2 tablets by mouth daily Hold for loose stools   Refills:  0        * Notice:  This list has 2 medication(s) that are the same as other medications prescribed for you. Read the directions carefully, and ask your doctor or other care provider to review them with you.      CONTINUE these medications which have NOT CHANGED        Dose / Directions Comments    magnesium hydroxide 400 MG/5ML suspension   Commonly known as:  MILK OF MAGNESIA   Used for:  Drug-induced constipation        Dose:  30 mL   Take 30 mLs by mouth daily as needed for constipation or heartburn   Quantity:  105 mL   Refills:  0          STOP taking     METOPROLOL SUCCINATE ER PO           RANITIDINE HCL PO           sennosides 8.6 MG tablet   Commonly known as:  SENOKOT                     Further instructions from your care team       Please have [facility] reassess if pt appropriate to admit and discuss with Filemon after dc.     May apply heat therapy to L antecubital and R wrist painful IV sites prn.  See also order for voltaren gel topically to same areas.     Summary of Visit     Reason for your hospital stay       Tachybrady syndrome             After Care     Activity - Up ad austyn           Advance Diet as Tolerated       Follow this diet upon discharge: Orders Placed This Encounter      Room Service      Snacks/Supplements Adult: Magic Cup; With Meals      Combination Diet Dysphagia Diet Level 1: Pureed; Nectar Thickened Liquids (pre-thickened or use instant food thickener)       General info for SNF       Length of Stay Estimate: Long Term Care  Condition at Discharge: Stable  Level of care:board and care  Rehabilitation Potential: Fair  Admission H&P remains valid and up-to-date: Yes  Recent Chemotherapy: N/A  Use Nursing Home Standing Orders: Yes       Mantoux instructions       Give  two-step Mantoux (PPD) Per Facility Policy Yes             Supplies     Aqua K pad and pump       Heating therapy to L antecubital and or R wrist IV site prn pain             Follow-Up Appointment Instructions     Future Labs/Procedures    Follow Up and recommended labs and tests     Comments:    Follow up with Nursing home physician.  No follow up labs or test are needed.  Follow up with primary care provider and hospice team in few days    Follow-up and recommended labs and tests      Comments:    Follow up with Dr. Hopson at [facility] in 1 month.      Follow-Up Appointment Instructions     Follow Up and recommended labs and tests       Follow up with Nursing home physician.  No follow up labs or test are needed.  Follow up with primary care provider and hospice team in few days       Follow-up and recommended labs and tests        Follow up with Dr. Hopson at [facility] in 1 month.             Statement of Approval     Ordered          06/01/17 1017  I have reviewed and agree with all the recommendations and orders detailed in this document.  EFFECTIVE NOW     Approved and electronically signed by:  Stanislaw Franco MD

## 2017-05-22 NOTE — PLAN OF CARE
Problem: Discharge Planning  Goal: Discharge Planning (Adult, OB, Behavioral, Peds)  Outcome: Improving  PRIMARY DIAGNOSIS: Unable to place catheter, Bradycardia  OUTPATIENT/OBSERVATION GOALS TO BE MET BEFORE DISCHARGE:      1. Orthostatic symptoms (BP decrease or HR increase with patient upright)? N/A  2. Vital Signs stable? HR in 30-40s  3. Documented urine output: Yes  4. Tolerating PO fluid: NPO  5. Symptoms improved: Yes, after rutledge catheter placement  6. Labs WNL? No  7. ADLs back to baseline? Yes   8. Activity and level of assistance: Total assist, lift - baseline  9. Pain status: Improved-controlled with oral pain medications.  10. Barriers to discharge noted No      Interpretation of rhythm per telemetry tech: Junctional (38)     Sepsis protocol triggered, lactic acid 3.9, PA notified, bolus started, catheter placed at bedside by Dr. Gay, pt stated immediate relief, oxycodone and ativan given, total care, now resting comfortably, will continue to monitor and provide supportive cares.

## 2017-05-22 NOTE — ED NOTES
[facility]  ED Nurse Handoff Report    Edil Lopez is a 85 year old male   ED Chief complaint: Catheter Problem  . ED Diagnosis:   Final diagnoses:   Dislodged Ortiz catheter, initial encounter   Chronic kidney disease, unspecified stage   Dementia without behavioral disturbance, unspecified dementia type     Allergies:   Allergies   Allergen Reactions     Lisinopril      Penicillins        Code Status: Full Code  Activity level - Baseline/Home:  Unable to Assess. Activity Level - Current:   Total Care. Lift room needed: No. Bariatric: No   Needed: No   Isolation: No. Infection: Not Applicable.     Vital Signs:   Vitals:    05/22/17 0830 05/22/17 0847 05/22/17 0855 05/22/17 0900   BP: 111/82  113/73 101/66   Pulse:       Resp:       Temp:       TempSrc:       SpO2:  93% 99% 99%     Cardiac Rhythm:  ,      Pain level: 0-10 Pain Scale: 5  Patient confused: Yes. Patient Falls Risk: Yes.   Elimination Status: Unable to void, Bladder was scanned at 0940 AM; bladder scan volume was: 252 mL. Prior shift attempted catheterization and were unsuccessful. Patient has not voided for this writer.  Patient Report - Initial Complaint: problem with catheter placement st SN[facility]. Focused Assessment: alert, confused, restless, crying out, grabbing at diaper and lines, follows commands/redirectable; monitor shows kelly rhythymn at 38-40 bpm, junctional rhythymn, patient dyspneic and tachypneic during cares, lungs diminished but clear; scant amount of blood at urinary meatus, diapered, complains of pain during carmenza care  Tests Performed: labs, cath attempted, CXR, CT abd/pelvis to R/O stone. Abnormal Results: BUN and creat elevated, WBC elevated, HGB low.   Treatments provided: monitoring, comfort cares  Family Comments: no family present, spoke with nurse Ghotra from [facility] brochure/video discussed/provided to patient:  N/A, patient unable to retain learning  ED Medications:   Medications   lidocaine  (XYLOCAINE) 2 % topical gel (not administered)   lidocaine 2 % (URO-JET) jelly 20 mL (not administered)   0.9% sodium chloride BOLUS (not administered)   ketorolac (TORADOL) injection 15 mg (15 mg Intravenous Given 5/22/17 0732)     Drips infusing:  No     ED Nurse Name/Phone Number: Leslie Solomon,   9:58 AM    RECEIVING UNIT ED HANDOFF REVIEW    Above ED Nurse Handoff Report was reviewed: Yes  Reviewed by: Clarissa Richard on May 22, 2017 at 10:13 AM

## 2017-05-22 NOTE — IP AVS SNAPSHOT
"    [place] [address] MEDICAL SURGICAL: [phone]                                              INTERAGENCY TRANSFER FORM - LAB / IMAGING / EKG / EMG RESULTS   2017                    Hospital Admission Date: 2017  KEELY GUILLEN   : 1931  Sex: Male        Attending Provider: Hao Suárez MD     Allergies:  Lisinopril, Penicillins    Infection:  None   Service:  HOSPITALIST    Ht:  1.676 m (5' 6\")   Wt:  76.3 kg (168 lb 4.8 oz)   Admission Wt:  75.4 kg (166 lb 3.2 oz)    BMI:  27.16 kg/m 2   BSA:  1.88 m 2            Patient PCP Information     Provider PCP Type    Shirley Toscano MD General         Lab Results - 3 Days      Basic metabolic panel [[id number]] (Abnormal)  Resulted: 17, Result status: Final result    Ordering provider: Stanislaw Franco MD  17 0000 Resulting lab: [facility]    Specimen Information    Type Source Collected On   Blood  17          Components       Value Reference Range Flag Lab   Sodium 141 133 - 144 mmol/L  FrRdHs   Potassium 4.1 3.4 - 5.3 mmol/L  FrRdHs   Chloride 112 94 - 109 mmol/L H FrRdHs   Carbon Dioxide 21 20 - 32 mmol/L  FrRdHs   Anion Gap 8 3 - 14 mmol/L  FrRdHs   Glucose 89 70 - 99 mg/dL  FrRdHs   Urea Nitrogen 13 7 - 30 mg/dL  FrRdHs   Creatinine 0.96 0.66 - 1.25 mg/dL  FrRdHs   GFR Estimate 74 >60 mL/min/1.7m2  FrRdHs   Comment:  Non  GFR Calc   GFR Estimate If Black 89 >60 mL/min/1.7m2  FrRdHs   Comment:  African American GFR Calc   Calcium 9.9 8.5 - 10.1 mg/dL  FrRdHs            CBC with platelets [[id number]] (Abnormal)  Resulted: 17, Result status: Final result    Ordering provider: Stanislaw Franco MD  17 0000 Resulting lab: [facility]    Specimen Information    Type Source Collected On   Blood  17          Components       Value Reference Range Flag Lab   WBC 12.0 4.0 - 11.0 10e9/L H FrRdHs   RBC Count 3.16 4.4 - 5.9 10e12/L L FrSimbaHs " "  Hemoglobin 9.2 13.3 - 17.7 g/dL L FrRdHs   Hematocrit 29.7 40.0 - 53.0 % L FrRd   MCV 94 78 - 100 fl  FrRdHs   MCH 29.1 26.5 - 33.0 pg  FrRdHs   MCHC 31.0 31.5 - 36.5 g/dL L FrRd   RDW 16.7 10.0 - 15.0 % H FrRdHs   Platelet Count 311 150 - 450 10e9/L  Rd            Potassium [[id number]]  Resulted: 05/29/17 0727, Result status: Final result    Ordering provider: Hao Suárez MD  05/29/17 0353 Resulting lab: [facility]    Specimen Information    Type Source Collected On   Blood  05/29/17 0654          Components       Value Reference Range Flag Lab   Potassium 4.2 3.4 - 5.3 mmol/L  [facility]            Testing Performed By     Lab - Abbreviation Name Director Address Valid Date Range    12 - [facility] Unknown [address] [place] [address] 05/08/15 1057 - Present            Unresulted Labs (24h ago through future)    Start       Ordered    Unscheduled  Potassium  (Potassium Replacement - \"Standard\" - For K levels less than 3.4 mmol/L - UU,UR,UA,RH,SH,PH,WY )  CONDITIONAL (SPECIFY),   Routine     Comments:  Obtain Potassium Level for these conditions:  *IF no potassium result within 24 hours before initiation of order set, draw potassium level with next lab collect.    *2 HOURS AFTER last IV potassium replacement dose and 4 hours after an oral replacement dose.  *Next morning after potassium dose.     Repeat Potassium Replacement if necessary.    05/27/17 0837         ECG/EMG Results      Echocardiogram Complete [[id number]]  Resulted: 05/24/17 0945, Result status: Edited Result - FINAL    Ordering provider: Albaro Moser MD  05/24/17 0844 Resulted by: Albaro Moser MD    Performed: 05/24/17 1018 - 05/24/17 1018 Resulting lab: RADIOLOGY RESULTS    Narrative:       [id number]  [id number]  [id number]  [id number]^DAVIE^ALBARO^MER           [facility]  Echocardiography Laboratory  [address] [place] [address]   "      Name: KEELY GUILLEN  MRN: [id number]  : 1931  Study Date: 2017 09:45 AM  Age: 85 yrs  Gender: Male  Patient [facility]: [facility]  Reason For Study: Bradycardia - Sinus  Ordering Physician: SHERRELL BRODERICK  Referring Physician: Shirley Toscano  Performed By: Hugo Alves RDCS     BSA: 1.8 m2  Height: 66 in  Weight: 166 lb  HR: 42  BP: 171/66 mmHg  _____________________________________________________________________________  __        Procedure  Complete Portable Echo Adult.  _____________________________________________________________________________  __        Interpretation Summary     1. The left ventricle is normal in structure, function and size. The visual  ejection fraction is estimated at 60%.  2. The right ventricle is mild-moderately dilated. Low-normal RV function.  3. There is moderately severe (3+) tricuspid regurgitation.  4. Pulmonary hypertension The right ventricular systolic pressure is  approximated at 45mmHg plus the right atrial pressure.  5. There is mild (1+) aortic regurgitation.     Echo from  showed mild TR, RVSP 25mmHg.  _____________________________________________________________________________  __        Left Ventricle  The left ventricle is normal in structure, function and size. There is  moderate concentric left ventricular hypertrophy. The visual ejection fraction  is estimated at 60%. Left ventricular diastolic function is indeterminate.  Normal left ventricular wall motion.     Right Ventricle  The right ventricle is mild to moderately dilated. Low-normal RV function.     Atria  The left atrium is moderately dilated. The right atrium is moderately dilated.  There is no atrial shunt seen.     Mitral Valve  There is trace mitral regurgitation.        Tricuspid Valve  There is moderately severe (3+) tricuspid regurgitation. Pulmonary  hypertension. The right ventricular systolic pressure is approximated at  45mmHg plus the right atrial pressure.     Aortic  Valve  There is mild (1+) aortic regurgitation. No aortic stenosis is present.     Pulmonic Valve  The pulmonic valve is normal in structure and function.     Vessels  Normal ascending, transverse (arch), and descending aorta. The IVC is dilated  and fails to change with respiration, suggesting elevated central venous  pressure.     Pericardium  There is no pericardial effusion.        Rhythm  Junctional rhythm.  _____________________________________________________________________________  __  MMode/2D Measurements & Calculations  RVDd: 4.0 cm  IVSd: 1.6 cm     LVIDd: 3.6 cm  LVIDs: 2.0 cm  LVPWd: 1.4 cm  FS: 43.7 %  EDV(Teich): 55.9 ml  ESV(Teich): 13.6 ml  LV mass(C)d: 201.9 grams  LV mass(C)dI: 109.2 grams/m2  Ao root diam: 3.5 cm  LA dimension: 4.8 cm  asc Aorta Diam: 3.4 cm  LA/Ao: 1.4  LVOT diam: 2.0 cm  LVOT area: 3.1 cm2  LA Volume (BP): 86.0 ml  LA Volume Index (BP): 46.5 ml/m2           Doppler Measurements & Calculations  MV E max rogerio: 74.5 cm/sec  MV dec time: 0.23 sec  AI P1/2t: 858.9 msec  TR max rogerio: 322.0 cm/sec  TR max P.5 mmHg  Lateral E/e': 9.0  Medial E/e': 11.2           _____________________________________________________________________________  __           Report approved by: Stiven Clinton 2017 01:14 PM       1    Type Source Collected On     17 0945          View Image (below)              Encounter-Level Documents:     There are no encounter-level documents.      Order-Level Documents:     There are no order-level documents.

## 2017-05-22 NOTE — ED NOTES
PT signed out pending urine production. PT demented and unable to give history Heart rate quite bradycardic, and ekg shows juncitonal bradycardia, pt documented to be on metoprolol, unable to reach financial party, but due to bradycardia,will recommend admit for telemetry monitor, and holding metoprolol, and cardiology consltation if no improvement with time.  Care discussed with NP from hospitalist service, and care discussed with Dr. Cruz, who recommends consultation with [facility] urology       Eric Landers MD  05/22/17 4844

## 2017-05-22 NOTE — ED NOTES
"Patient crying out, restless, pulling at diaper and genital area, dyspneic at rest with a RR of 36/min. Repositioned, carmenza care provided, oral care provided. Bladder scan shows 225 ml in bladder, patient states he feels the urge to void, but unable at this time. MD notified, pain medication ordered. Monitor placed and patient found to be kelly, EKG ordered by MD, and MD to bedside to assess patient. Complaining of pain \"all over\" and complaining of back being \"itchy\". Lotion applied to back. Will reassess.  "

## 2017-05-22 NOTE — H&P
[facility]  Outpatient/Observation Unit  Admission History and Physical Examination      Chief Complaint   Patient presents with     Catheter Problem       HPI:    Edil Lopez is a 85 year old male with a history of BPH and urinary retention and dementia who presented to the ED following loss of rutledge catheter.    Hx was obtained by speaking with the Emergency Room physician, chart review and interview with the patient.     This is an 85 year-old man with previous chronic suprapubic catheter and then rutledge catheter for BPH and urinary retention admitted after rutledge catheter accidentally removed at his nursing home.  Attempts at replacement of rutledge at nursing home and in ED were unsuccessful.  Therefore he is admitted for urologic evaluation for catheter placement.  While in ER, he was also noted to have heart rate in 30s, seemingly asymptomatic with appropriate blood pressure.  Patient has severe dementia but denies chest pain, dyspnea, and light-headedness.  He admits to intermittent sharp lower abdominal pain.    History:       Allergies   Allergen Reactions     Lisinopril      Penicillins        Past Medical History:   Diagnosis Date     Anemia      BPH (benign prostatic hyperplasia)      Chronic kidney disease      CVA (cerebral infarction)      Dementia      Gastro-oesophageal reflux disease      Hyperparathyroidism (H)      Hypertension      Hypothyroidism      Neuromuscular disorder (H)      PVD (peripheral vascular disease) (H)        Past Surgical History:   Procedure Laterality Date     APPENDECTOMY OPEN       ENDARTERECTOMY CAROTID       HERNIA REPAIR       Suprapubic catheter placement         Unable to obtain family history due to dementia.     Social History     Social History     Marital status:      Spouse name: N/A     Number of children: N/A     Years of education: N/A     Occupational History     Not on file.     Social History Main Topics     Smoking status: Former Smoker  "    Smokeless tobacco: Not on file     Alcohol use No     Drug use: No     Sexual activity: No     Other Topics Concern     Not on file     Social History Narrative         No current facility-administered medications on file prior to encounter.   No current outpatient prescriptions on file prior to encounter.      ROS:    10 point ROS negative other than the symptoms noted above.      Exam:    Vitals:  B/P: 133/49, T: 97.6, P: 47, R: 16    Constitutional: healthy, alert and no distress  Head: Normocephalic. No masses, lesions, tenderness or abnormalities  Neck: Neck supple. No adenopathy.   ENT: ENT exam normal, no neck nodes or sinus tenderness  Cardiovascular: Regular rhythm, rate 40s at time of examination, no murmurs  Respiratory: negative, Percussion normal. Good diaphragmatic excursion. Lungs clear  Gastrointestinal: Abdomen soft, non-tender. BS normal. Nondistended, no pain to palpation.  : Hypospadias noted.  Former suprapubic catheter site is clean and dry without drainage.  Musculoskeletal: Normal muscle bulk  Skin: no suspicious lesions or rashes  Neurologic: Gait not assessed.    Psychiatric: Alert and oriented to person and \"Hospital\", not to time or situation.  Appropriate.    Data:    Results for orders placed or performed during the hospital encounter of 05/22/17   Chest  XR, 1 view PORTABLE    Narrative    XR CHEST PORT 1 VW 5/22/2017 8:46 AM    COMPARISON: 6/2/2011    HISTORY: Bradycardia      Impression    IMPRESSION: Mildly enlarged cardiac silhouette. No focal airspace  disease, pleural effusion or pneumothorax.    ELSA RIGGS   Abd/pelvis CT no contrast - Stone Protocol    Narrative    CT ABDOMEN AND PELVIS WITHOUT CONTRAST   5/22/2017 9:43 AM     HISTORY: Unable to urinate. Evaluate for stones.    TECHNIQUE: Volumetric helical sections were acquired from the lung  bases through the ischial tuberosities without IV contrast. Coronal  images were also reconstructed. Radiation dose for this " scan was  reduced using automated exposure control, adjustment of the mA and/or  kV according to patient size, or iterative reconstruction technique.    COMPARISON: None.    FINDINGS: There is a large nonobstructing bladder stone posteriorly,  measuring 3.9 x 2.1 cm. A few smaller adjacent bladder stones are also  noted. Small amount of air within the urinary bladder may be related  to recent instrumentation, although a colovesical fistula cannot be  excluded. There is mild prostatic enlargement and central  calcification. Nonobstructing 1.2 cm stone in the lower pole of the  right kidney. Cyst in the lower pole of the left kidney measures 2.2  cm. No hydronephrosis.    No bowel obstruction. Colonic diverticulosis. Mild fat stranding  adjacent to the proximal sigmoid colon could represent acute  diverticulitis. No associated fluid collections are identified to  suggest abscess. There is a small area of extraluminal air extending  from the rectosigmoid region along the superior aspect of the prostate  gland (series 3 image 90; series 2 image 70). No free fluid in the  pelvis. Moderate atherosclerotic aortoiliac calcification. Ectasia of  the infrarenal abdominal aorta measures up to 2.8 cm AP. The liver,  gallbladder, spleen, adrenal glands, and pancreas have unremarkable  noncontrast appearances. Cardiac enlargement. Coronary artery  calcification. Mild fibrotic changes about the periphery of both lung  bases. Indeterminate 0.8 cm pulmonary nodule at the left lung base  posteriorly (series 2 image 17). Degenerative changes are noted  throughout the visualized thoracolumbar spine and in both hips.      Impression    IMPRESSION:   1. Large nonobstructing bladder stone measures 3.9 cm, with a few  smaller bladder stones also noted.  2. Nonobstructing 1.2 cm stone in the lower pole of the right kidney.  3. Mild bowel wall thickening and fat stranding involving the proximal  sigmoid colon could represent a mild  diverticulitis. Please clinically  correlate.  4. Small amount of extraluminal air extends anteriorly from the  rectosigmoid region along the anterior aspect of the prostate gland.  There is also a small amount of air within the urinary bladder. A  colovesical fistula in this location cannot be excluded. Clinical  correlation is again requested.  5. Indeterminate 0.8 cm pulmonary nodule at the left lung base  posteriorly.    MARGARITO LYONS MD   Basic metabolic panel   Result Value Ref Range    Sodium 142 133 - 144 mmol/L    Potassium 4.2 3.4 - 5.3 mmol/L    Chloride 112 (H) 94 - 109 mmol/L    Carbon Dioxide 21 20 - 32 mmol/L    Anion Gap 9 3 - 14 mmol/L    Glucose 142 (H) 70 - 99 mg/dL    Urea Nitrogen 57 (H) 7 - 30 mg/dL    Creatinine 1.81 (H) 0.66 - 1.25 mg/dL    GFR Estimate 36 (L) >60 mL/min/1.7m2    GFR Estimate If Black 43 (L) >60 mL/min/1.7m2    Calcium 10.9 (H) 8.5 - 10.1 mg/dL   CBC (platelets, no diff)   Result Value Ref Range    WBC 13.2 (H) 4.0 - 11.0 10e9/L    RBC Count 3.32 (L) 4.4 - 5.9 10e12/L    Hemoglobin 9.7 (L) 13.3 - 17.7 g/dL    Hematocrit 31.4 (L) 40.0 - 53.0 %    MCV 95 78 - 100 fl    MCH 29.2 26.5 - 33.0 pg    MCHC 30.9 (L) 31.5 - 36.5 g/dL    RDW 14.4 10.0 - 15.0 %    Platelet Count 308 150 - 450 10e9/L   Troponin I   Result Value Ref Range    Troponin I ES 0.044 0.000 - 0.045 ug/L        EKG Interpretation:  Interpreted by Kat Quiroga  Symptoms at time of EKG: None   Rhythm: Normal sinus  and Junctional  Rate: 30-40  Axis: Normal    Clinical Impression: Likely  Junctional bradycardia    Assessment/Plan:  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  Ortiz dislodged, unsuccessful attempts at replacement at NH and in ED.      PLAN:  1. Consult to urology for management  2. Continue bladder scans to  monitor for retention  3. NPO until evaluation by urology in case need OR    Acute renal failure, possibly obstructive, on chronic kidney disease stage 3  --baseline creatinine 1.2-1.3, current creatinine 1.8.  Possibly obstructive given BPH.  Urology to evaluate as above.  Also continue IVF and repeat BMP in AM.    Bradycardia  Hypertension  Reported history of CVA and CAD  --likely junctional bradycardia.  Chronically on beta-blocker presumably with CAD history though details of CAD unclear.  Hold Toprol XL and monitor on telemetry.    Chronic pain: continue PTA oxycodone.  Senna and miralax while on narcotics for bowel prophylaxis.    Dementia  --continue PTA lorazepam.  Not best long-term med in elderly but will not discontinue abruptly to avoid withdrawal    VTE prophylaxis: not indicated, observation status  Code: DNR/DNI per advanced directive  Disposition: pending successful replacement of catheter and improvement in bradycardia, return to [facility]  Updated patient's listed guardian Fly Calvert at [phone]    Signed:  Kat Quiroga PA-C  May 22, 2017 at 11:06 AM

## 2017-05-22 NOTE — CONSULTS
Urology consult for: Chronic urinary retention, catheter self-removed overnight, inability to replace catheter   Req provider: SHREWIN Quiroga    History obtained from chart review and discussion with bedside staff  HPI: Pt is a 86yo male pt of Dr. Hopson of [facility]. Pt has a PMH significant for BPH, atonic bladder, and urinary retention. Pt has had a chronic catheter in place for years. Pt had an SP tube placed in the past for urinary retention and urethral erosion. Roughly 1-2 months ago pt pulled the SP tube out and it was unable to be replaced. However, a rutledge catheter was able to be placed per urethra. This was kept in place until late yesterday when he self-removed the catheter late in the day. Staff at his nursing facility were unable to re-establish the cathter, so the patient was sent to [facility] ED for further evaluation and treatment. Nursing staff in the [facility] ED were unable to replace catheter, so urology was consulted.     Today, pt is found laying in bed in no acute distress. Pt complains of penile discomfort and the urge to void. Pt has dementia and is otherwise unable to provide history. There are no family members or guardians at bedside to provide history.     Past Medical History:   Diagnosis Date     Anemia      BPH (benign prostatic hyperplasia)      Chronic kidney disease      CVA (cerebral infarction)      Dementia      Gastro-oesophageal reflux disease      Hyperparathyroidism (H)      Hypertension      Hypothyroidism      Neuromuscular disorder (H)      PVD (peripheral vascular disease) (H)      Review Of Systems: Unable to do full ROS due to impaired pt mentation- dementia  General: Denies F/C  Respiratory: Denies SOB  Cardiovascular: Denies CP  Gastrointestinal: Denies N/V, is thirsty  Genitourinary: See HPI  Musculoskeletal: Denies LE pain    Past Surgical History:   Procedure Laterality Date     APPENDECTOMY OPEN       ENDARTERECTOMY CAROTID       HERNIA REPAIR       Suprapubic catheter  placement       Social Hx:  Social History     Social History     Marital status:      Spouse name: N/A     Number of children: N/A     Years of education: N/A     Occupational History     Not on file.     Social History Main Topics     Smoking status: Former Smoker     Smokeless tobacco: Not on file     Alcohol use No     Drug use: No     Sexual activity: No     Other Topics Concern     Not on file     Social History Narrative     Meds:  Prescriptions Prior to Admission   Medication Sig Dispense Refill Last Dose     sennosides (SENOKOT) 8.6 MG tablet Take 2 tablets by mouth every 12 hours as needed for constipation        LORazepam (LORAZEPAM INTENSOL) 2 MG/ML (HIGH CONC) solution Take 0.5 mg by mouth every 4 hours as needed for anxiety        OXYCODONE HCL PO Take 5 mg by mouth every 4 hours as needed        magnesium hydroxide (MILK OF MAGNESIA) 400 MG/5ML suspension Take 30 mLs by mouth daily as needed for constipation or heartburn        Acetaminophen (TYLENOL PO) Take 1,000 mg by mouth 3 times daily 0800, 1200, 1800   5/21/2017 at Unknown time     METOPROLOL SUCCINATE ER PO Take 200 mg by mouth 2 times daily Hold & call MD if AP <60 pr SBP <100   5/21/2017 at Unknown time     RANITIDINE HCL PO Take 75 mg by mouth 2 times daily   5/21/2017 at Unknown time     sennosides (SENOKOT) 8.6 MG tablet Take 2 tablets by mouth At Bedtime   5/21/2017 at Unknown time     senna-docusate (SENOKOT-S;PERICOLACE) 8.6-50 MG per tablet Take 2 tablets by mouth daily   5/21/2017 at Unknown time     polyethylene glycol (MIRALAX/GLYCOLAX) Packet Take 17 g by mouth daily   5/21/2017 at Unknown time     LORazepam (LORAZEPAM INTENSOL) 2 MG/ML (HIGH CONC) solution Take 0.5 mg by mouth 2 times daily   5/21/2017 at Unknown time     OXYCODONE HCL PO Take 5 mg by mouth 4 times daily 0100, 0800, 1400, 2000   5/21/2017 at Unknown time      Allergies   Allergen Reactions     Lisinopril      Penicillins      Labs:  ROUTINE IP LABS (Last  four results)  BMP  Recent Labs  Lab 05/22/17  0230      POTASSIUM 4.2   CHLORIDE 112*   JENNA 10.9*   CO2 21   BUN 57*   CR 1.81*   *     CBC  Recent Labs  Lab 05/22/17  0230   WBC 13.2*   RBC 3.32*   HGB 9.7*   HCT 31.4*   MCV 95   MCH 29.2   MCHC 30.9*   RDW 14.4        UA RESULTS:  Recent Labs   Lab Test  04/27/17   1604   COLOR  Katie   APPEARANCE  Cloudy   URINEGLC  Negative   URINEBILI  Negative   URINEKETONE  Negative   SG  1.018   UBLD  Moderate*   URINEPH  7.0   PROTEIN  100*   NITRITE  Positive*   LEUKEST  Large*   RBCU  122*   WBCU  >182*     Imaging:CT ABDOMEN AND PELVIS WITHOUT CONTRAST 5/22/2017 9:43 AM      HISTORY: Unable to urinate. Evaluate for stones.     TECHNIQUE: Volumetric helical sections were acquired from the lung  bases through the ischial tuberosities without IV contrast. Coronal  images were also reconstructed. Radiation dose for this scan was  reduced using automated exposure control, adjustment of the mA and/or  kV according to patient size, or iterative reconstruction technique.     COMPARISON: None.     FINDINGS: There is a large nonobstructing bladder stone posteriorly,  measuring 3.9 x 2.1 cm. A few smaller adjacent bladder stones are also  noted. Small amount of air within the urinary bladder may be related  to recent instrumentation, although a colovesical fistula cannot be  excluded. There is mild prostatic enlargement and central  calcification. Nonobstructing 1.2 cm stone in the lower pole of the  right kidney. Cyst in the lower pole of the left kidney measures 2.2  cm. No hydronephrosis.     No bowel obstruction. Colonic diverticulosis. Mild fat stranding  adjacent to the proximal sigmoid colon could represent acute  diverticulitis. No associated fluid collections are identified to  suggest abscess. There is a small area of extraluminal air extending  from the rectosigmoid region along the superior aspect of the prostate  gland (series 3 image 90; series 2  image 70). No free fluid in the  pelvis. Moderate atherosclerotic aortoiliac calcification. Ectasia of  the infrarenal abdominal aorta measures up to 2.8 cm AP. The liver,  gallbladder, spleen, adrenal glands, and pancreas have unremarkable  noncontrast appearances. Cardiac enlargement. Coronary artery  calcification. Mild fibrotic changes about the periphery of both lung  bases. Indeterminate 0.8 cm pulmonary nodule at the left lung base  posteriorly (series 2 image 17). Degenerative changes are noted  throughout the visualized thoracolumbar spine and in both hips.      IMPRESSION:   1. Large nonobstructing bladder stone measures 3.9 cm, with a few  smaller bladder stones also noted.  2. Nonobstructing 1.2 cm stone in the lower pole of the right kidney.  3. Mild bowel wall thickening and fat stranding involving the proximal  sigmoid colon could represent a mild diverticulitis. Please clinically  correlate.  4. Small amount of extraluminal air extends anteriorly from the  rectosigmoid region along the anterior aspect of the prostate gland.  There is also a small amount of air within the urinary bladder. A  colovesical fistula in this location cannot be excluded. Clinical  correlation is again requested.  5. Indeterminate 0.8 cm pulmonary nodule at the left lung base  posteriorly.     MARGARITO LYONS MD    Exam:  /49 (BP Location: Right arm)  Pulse (!) 47  Temp 97.6  F (36.4  C) (Oral)  Resp 16  SpO2 99%    EXAM:   Constitutional: healthy, alert, no acute distress and cooperative   Cardiovascular: RRR  Respiratory: no secondary muscle use  Psychiatric: mentation appears abnormal- oriented only to self  Neck: no asymmetry  Abdomen:  abdomen soft, no point tenderness, though pt does not like having abdomen palpated.   : Uncirc penis, ventral erosion of the meatus, no blood or discharge per urethra. Foreskin retracted and meatus cleaned with iodine solution. Urojet x2 were instilled per urethra, though some  leaked out due to the meatal erosion. Pt had discomfort with both iodine and urojet application. 16Fr coude catheter was then inserted per urethra. Initially there was no resistance, but then after ~3-4 inches were inserted, there was resistance. Gentle attempts to insert the catheter and rotate it failed to allow it to advance further. After these attempt failed, catheter was removed and there was a very minor amount of at the distal 1-2 inches of the catheter that was attempted to be placed  MSK: no calf tenderness, no edema  Skin: no open lesions other than a closed healing non-infected 1 cm abrasion to the left inner thigh, extremities warm and well perfused  Hematology: no bruising on visible skin    Assessment/plan:    Chronic urinary retention, catheter self-removed overnight, inability to replace catheter. I failed to place catheter, suspect a false passage   -Urologist will place catheter via bedside cystoscopy      Discussed exam findings, lab and imaging results and plan with Dr. Gay.  Please contact me with any questions or concerns.     Jacqueline Arshad PA-C  [facility]  Pager:  [phone]  After 4pm and on weekends, please call [phone]

## 2017-05-23 PROBLEM — N19 RENAL FAILURE: Status: ACTIVE | Noted: 2017-05-23

## 2017-05-23 LAB
ANION GAP SERPL CALCULATED.3IONS-SCNC: 11 MMOL/L (ref 3–14)
BUN SERPL-MCNC: 73 MG/DL (ref 7–30)
CALCIUM SERPL-MCNC: 10.6 MG/DL (ref 8.5–10.1)
CHLORIDE SERPL-SCNC: 116 MMOL/L (ref 94–109)
CO2 SERPL-SCNC: 19 MMOL/L (ref 20–32)
CREAT SERPL-MCNC: 2.06 MG/DL (ref 0.66–1.25)
ERYTHROCYTE [DISTWIDTH] IN BLOOD BY AUTOMATED COUNT: 14.7 % (ref 10–15)
GFR SERPL CREATININE-BSD FRML MDRD: 31 ML/MIN/1.7M2
GLUCOSE SERPL-MCNC: 113 MG/DL (ref 70–99)
HCT VFR BLD AUTO: 32.8 % (ref 40–53)
HGB BLD-MCNC: 10.1 G/DL (ref 13.3–17.7)
MCH RBC QN AUTO: 30.1 PG (ref 26.5–33)
MCHC RBC AUTO-ENTMCNC: 30.8 G/DL (ref 31.5–36.5)
MCV RBC AUTO: 98 FL (ref 78–100)
PLATELET # BLD AUTO: 314 10E9/L (ref 150–450)
POTASSIUM SERPL-SCNC: 5.3 MMOL/L (ref 3.4–5.3)
RBC # BLD AUTO: 3.36 10E12/L (ref 4.4–5.9)
SODIUM SERPL-SCNC: 146 MMOL/L (ref 133–144)
WBC # BLD AUTO: 13 10E9/L (ref 4–11)

## 2017-05-23 PROCEDURE — 27210995 ZZH RX 272: Performed by: PHYSICIAN ASSISTANT

## 2017-05-23 PROCEDURE — 25000128 H RX IP 250 OP 636: Performed by: PHYSICIAN ASSISTANT

## 2017-05-23 PROCEDURE — 40000914 ZZH STATISTIC SITTER, DAY HOURS

## 2017-05-23 PROCEDURE — G0378 HOSPITAL OBSERVATION PER HR: HCPCS

## 2017-05-23 PROCEDURE — 25000132 ZZH RX MED GY IP 250 OP 250 PS 637: Performed by: HOSPITALIST

## 2017-05-23 PROCEDURE — 40000915 ZZH STATISTIC SITTER, EVENING HOURS

## 2017-05-23 PROCEDURE — 36415 COLL VENOUS BLD VENIPUNCTURE: CPT | Performed by: PHYSICIAN ASSISTANT

## 2017-05-23 PROCEDURE — 99233 SBSQ HOSP IP/OBS HIGH 50: CPT | Performed by: HOSPITALIST

## 2017-05-23 PROCEDURE — 80048 BASIC METABOLIC PNL TOTAL CA: CPT | Performed by: PHYSICIAN ASSISTANT

## 2017-05-23 PROCEDURE — 25000132 ZZH RX MED GY IP 250 OP 250 PS 637: Performed by: PHYSICIAN ASSISTANT

## 2017-05-23 PROCEDURE — 12000007 ZZH R&B INTERMEDIATE

## 2017-05-23 PROCEDURE — 85027 COMPLETE CBC AUTOMATED: CPT | Performed by: PHYSICIAN ASSISTANT

## 2017-05-23 RX ORDER — HYDROCHLOROTHIAZIDE 25 MG/1
25 TABLET ORAL DAILY
Status: DISCONTINUED | OUTPATIENT
Start: 2017-05-23 | End: 2017-05-24

## 2017-05-23 RX ORDER — SODIUM CHLORIDE 450 MG/100ML
INJECTION, SOLUTION INTRAVENOUS CONTINUOUS
Status: DISCONTINUED | OUTPATIENT
Start: 2017-05-23 | End: 2017-05-24

## 2017-05-23 RX ADMIN — ACETAMINOPHEN 1000 MG: 500 TABLET, FILM COATED ORAL at 07:39

## 2017-05-23 RX ADMIN — ACETAMINOPHEN 1000 MG: 500 TABLET, FILM COATED ORAL at 14:52

## 2017-05-23 RX ADMIN — OXYCODONE HYDROCHLORIDE 5 MG: 5 TABLET ORAL at 20:54

## 2017-05-23 RX ADMIN — OXYCODONE HYDROCHLORIDE 5 MG: 5 TABLET ORAL at 07:39

## 2017-05-23 RX ADMIN — SENNOSIDES AND DOCUSATE SODIUM 1 TABLET: 8.6; 5 TABLET ORAL at 07:39

## 2017-05-23 RX ADMIN — LORAZEPAM 0.5 MG: 2 CONCENTRATE ORAL at 11:56

## 2017-05-23 RX ADMIN — OXYCODONE HYDROCHLORIDE 5 MG: 5 TABLET ORAL at 16:07

## 2017-05-23 RX ADMIN — LORAZEPAM 0.5 MG: 2 CONCENTRATE ORAL at 20:54

## 2017-05-23 RX ADMIN — LORAZEPAM 0.5 MG: 2 CONCENTRATE ORAL at 00:36

## 2017-05-23 RX ADMIN — OXYCODONE HYDROCHLORIDE 5 MG: 5 TABLET ORAL at 12:37

## 2017-05-23 RX ADMIN — LORAZEPAM 0.5 MG: 2 CONCENTRATE ORAL at 06:02

## 2017-05-23 RX ADMIN — SENNOSIDES AND DOCUSATE SODIUM 1 TABLET: 8.6; 5 TABLET ORAL at 20:54

## 2017-05-23 RX ADMIN — HYDROCHLOROTHIAZIDE 25 MG: 25 TABLET ORAL at 12:37

## 2017-05-23 RX ADMIN — SODIUM CHLORIDE: 4.5 INJECTION, SOLUTION INTRAVENOUS at 07:39

## 2017-05-23 RX ADMIN — ACETAMINOPHEN 1000 MG: 500 TABLET, FILM COATED ORAL at 20:54

## 2017-05-23 RX ADMIN — SODIUM CHLORIDE: 9 INJECTION, SOLUTION INTRAVENOUS at 06:03

## 2017-05-23 RX ADMIN — SODIUM CHLORIDE: 4.5 INJECTION, SOLUTION INTRAVENOUS at 16:21

## 2017-05-23 NOTE — PROGRESS NOTES
Informed of 3.7 second pause. Patient remains asymptomatic, no chest pain, sob, lightheadedness, or syncope. Telemetry continues to show junctional bradycardia in 30s-40s since admission. No interventions at this time. AM provider could consider cardiology consult in AM.    Traci Francois PA-C

## 2017-05-23 NOTE — PROGRESS NOTES
Pt agitated/calling out more frequently again this morning. Prn ativan given. Will continue to monitor.

## 2017-05-23 NOTE — PROGRESS NOTES
[facility]    Sepsis Evaluation Progress Note    Date of Service: 05/22/2017    I was called to see Edil Lopez due to elevated lactic acid drawn at 3.9. He has a leukocytosis of 13.2. He is suspected to have a UTI infection based on UA obtained once Ortiz catheter was placed.    Physical Exam    Vital Signs:  Temp: 95.8  F (35.4  C) Temp src: Axillary BP: 141/58 Pulse: (!) 47 Heart Rate: 70 Resp: 20 SpO2: 96 % O2 Device: None (Room air)      Lab:  Lactic Acid   Date Value Ref Range Status   05/22/2017 3.9 (H) 0.7 - 2.1 mmol/L Final       The patient is at baseline mental status.    The rest of their physical exam is significant for:    Constitutional: alert, disoriented, no acute distress and cooperative   Cardiovascular: RRR  Respiratory: no secondary muscle use  Psychiatric: mentation appears abnormal- oriented only to self  Neck: no asymmetry  Abdomen: abdomen soft, no point tenderness.  : Uncircumcised penis w/ ventral erosion of the meatus.  Ortiz catheter has been placed.  MSK: no calf tenderness, no edema  Skin: no open lesions other than a closed healing non-infected 1 cm abrasion to the left inner thigh, extremities warm and well perfused  Hematology: no bruising on visible skin    Assessment and Plan    The SIRS and exam findings are likely due to UTI or dehydration (poor po intake all day).  IV bolus ordered as soon as Ortiz was placed as patient was having urinary retention from 5 AM until this evening when Ortiz was able to be placed.    ID: The patient was started on IV levaquin 500 mg per urology recommendations.  Anti-infectives     None        Current antibiotic coverage is appropriate for source of infection.    Fluid: Fluid bolus ordered.    Lab: Repeat lactic acid ordered for now that 1 L IV bolus complete.    Disposition: The patient will remain on the current unit. We will continue to monitor this patient closely.    Traci Francois PA-C

## 2017-05-23 NOTE — PLAN OF CARE
Problem: Discharge Planning  Goal: Discharge Planning (Adult, OB, Behavioral, Peds)  Outcome: No Change  1. Orthostatic symptoms (BP decrease or HR increase with patient upright)? N/A  2. Vital Signs stable? HR in 30-40s  3. Documented urine output: Yes  4. Tolerating PO fluid: NPO  5. Symptoms improved: Yes  6. Labs WNL? No  7. ADLs back to baseline? Yes   8. Activity and level of assistance: Total assist, lift - baseline  9. Pain status: Improved-controlled with oral pain medications.  10. Barriers to discharge noted No      Interpretation of rhythm per telemetry tech: Junctional (38)      Rep q 2 hours, alert to self only, shouting out frequently, attempting to bite off mittens- prn ativan given. BLE very cool to touch sitter at bedside, rutledge in place with adequate, cloudy yellow output. Levaquin started last evening. will continue to monitor and provide supportive cares.

## 2017-05-23 NOTE — CONSULTS
D:  Discharge planning  I/A:  Verified that pt is from [facility] and has an 18 day MA bed hold.  P:  SW remains available should needs arise.

## 2017-05-23 NOTE — PLAN OF CARE
"Problem: Discharge Planning  Goal: Discharge Planning (Adult, OB, Behavioral, Peds)  PRIMARY DIAGNOSIS: Catheter placement, Bradycardia, DWAIN     OUTPATIENT/OBSERVATION GOALS TO BE MET BEFORE DISCHARGE:     1. Orthostatic symptoms (BP decrease or HR increase with patient upright)?  N/A  2. Vital Signs stable? HR in 30-40s  3. Documented urine output: Catheter in place  4. Tolerating PO fluid: NPO  5. Symptoms improved: Yes  6. Labs WNL? No, Cr 2.06, Na 146, WBC 13.2  7. ADLs back to baseline?  Yes  8. Activity and level of assistance: Up with maximum assistance, lift.  Baseline for patient.  9. Pain status: Improved-controlled with oral pain medications.  10. Barriers to discharge noted No     Interpretation of rhythm per telemetry tech: Junctional rhythm in 30-40s, asymptomatic     Alert to self only, baseline.  Bedside attendant with patient to prevent pulling catheter and PIV.  Scored 6/10 on PAINAD scale stating pain was in his back and penis.  Scheduled oxycodone and Tylenol given.  Catheter care done.  Urine output clear, ada colored.  IVF running.  Q 2 hr turns to protect skin.  Multiple areas of bruising, scabs noted on skin.  Incontinent of stool.  Yells out intermittently \"Harlon\" (brother), \"help,\" and \"cowabunga.\"  Has scheduled and PRN ativan if needed.  Lungs clear.  Keep NPO until seen by MD. CRABTREE following.               "

## 2017-05-23 NOTE — PLAN OF CARE
Problem: Discharge Planning  Goal: Discharge Planning (Adult, OB, Behavioral, Peds)  PRIMARY DIAGNOSIS: Catheter placement, bradycardia, DWAIN     OUTPATIENT/OBSERVATION GOALS TO BE MET BEFORE DISCHARGE:     1. Orthostatic symptoms (BP decrease or HR increase with patient upright)?  N/A  2. Vital Signs stable? HR 30-40s  3. Documented urine output: Catheter in place  4. Tolerating PO fluid: Yes  5. Symptoms improved: Yes  6. Labs WNL? Cr 2.06, Na 146, WBC 13.2  7. ADLs back to baseline?  Yes  8. Activity and level of assistance: Up with maximum assistance, lift at baseline  9. Pain status: Improved-controlled with oral pain medications.  10. Barriers to discharge noted No     Interpretation of rhythm per telemetry tech: Junctional rhythm 30-40s     Alert to self, baseline  Continues to call out for his brother and becoming more agitated.  PRN ativan given.  Scored 6/10 on PAINAD scale.  Scheduled oxycodone given.  States pain is in his penis and shoulders.  Repositioned q 2 hrs.  Has intermittent periods of lamaze type breathing.  Appears to be due to frustration.  Started on HCTZ.  Will monitor BPs.  Bedside attended with patient to prevent pulling lines.

## 2017-05-23 NOTE — PROGRESS NOTES
Pt having apneic periods with periods of fast breathing. Lower extremities very cool to touch. Pt not calling out as much as earlier in the night. MD notified and will come to assess pt. Vitals stable. Sitter at bedside. Will continue to monitor.

## 2017-05-23 NOTE — PLAN OF CARE
Problem: Discharge Planning  Goal: Discharge Planning (Adult, OB, Behavioral, Peds)  Outcome: Declining  1. Orthostatic symptoms (BP decrease or HR increase with patient upright)? N/A  2. Vital Signs stable? HR in 30-40s  3. Documented urine output: Yes, however 110 ml from 2200 last night to 0440 this morning.  4. Tolerating PO fluid: NPO  5. Symptoms improved: Yes  6. Labs WNL? No  7. ADLs back to baseline? Yes   8. Activity and level of assistance: Total assist, lift - baseline  9. Pain status: Improved-controlled with oral pain medications.  10. Barriers to discharge noted No      Interpretation of rhythm per telemetry tech: Junctional (38)     Pt  Had 1 dose of prn ativan overnight (0045) d/t agitation. Pt's breathing while sleeping has periods of apnea followed by periods fast breathing - lower extremities very cool to touch. MD notified and came to see pt - states to continue monitoring. Ortiz is draining, however, output is 110 ml from 2200 last evening to 0445 this a.m. Bladder scanned for >13 ml. Had brown liquid/mucousy emesis x 2 overnight.   Rep q 2 hours, alert to self only,  Levaquin started last evening. will continue to monitor and provide supportive cares.

## 2017-05-23 NOTE — PROVIDER NOTIFICATION
Tele tech called - notified of 3.7 pause, pt asymptomatic, VSS, PA notified, sitter at bedside, will continue to monitor.

## 2017-05-23 NOTE — PLAN OF CARE
Problem: Discharge Planning  Goal: Discharge Planning (Adult, OB, Behavioral, Peds)  Outcome: Improving  PRIMARY DIAGNOSIS: Unable to place catheter, Bradycardia  OUTPATIENT/OBSERVATION GOALS TO BE MET BEFORE DISCHARGE:      1. Orthostatic symptoms (BP decrease or HR increase with patient upright)? N/A  2. Vital Signs stable? HR in 30-40s  3. Documented urine output: Yes  4. Tolerating PO fluid: NPO  5. Symptoms improved: Yes, after rutledge catheter placement  6. Labs WNL? No  7. ADLs back to baseline? Yes   8. Activity and level of assistance: Total assist, lift - baseline  9. Pain status: Improved-controlled with oral pain medications.  10. Barriers to discharge noted No      Interpretation of rhythm per telemetry tech: Junctional (38)      Bolus given, lactic to be rechecked, rep q 2 hours, alert to self only, shouting out occasionally, sitter at bedside, rutledge in place with adequate output, levaquin started, will continue to monitor and provide supportive cares.

## 2017-05-23 NOTE — PROGRESS NOTES
"[facility]    Hospitalist Progress Note    Date of Service (when I saw the patient): 05/23/2017    Assessment & Plan   Edil Lopez is a 85 year old male with dementia in memory care,  BPH/atonic bladder with history suprapubic cath (pulled), then rutledge placed (pulled),  who was admitted on 5/22/2017 after pulling rutledge again. Also renal failure and found to have bradycardia    1. Neuro- dementia. Lives in memory care. At baseline  2. CVS- HTN. Was on metoprolol. Found to have bradycardia into the 30's. Metoprolol held. Continue to monitor (has been better, would cont to monitor another 24 hours to see if resolves now that beta blocker stopped). Start HCTZ for BP  3. Pulm- no issues  4. GI- dysphagia diet  5. ID- possible UTI. Has mild elevation of WBC. Has had chronic rutledge. Would wait for final cultures prior to decision to start antibiotics  6. Renal- acute renal failure with hypernatremia. IVF with .45 NS. Chronic rutledge. Now placed again by urology. Spoke with Jacqueline (PA with urology). No further plans for them. Hopefully patient will not pull rutledge with 30cc in the balloon. Recommend to follow-up with Dr. Hopson with [facility]  7. Endo- not diabetic  8. Heme/onc- chronic anemia  9. Musuloskel- not ambulatory at baseline  10. Prophyaxis- SCDs  11. Admit to inpatient for acute renal failure requiring IVF. Bradycardia into the 30's, needs continued monitoring  12. Tried to update family. Spoke with niece. Wife passed a year ago. Son is in [place]. Patient has a guardian- will have social work find out who.    Code Status: DNR/DNI    Disposition: Expected discharge one renal failure improves and patient no longer bradycaric  Hao Gaelleslie Suárez    Interval History   Calls \"help\" intermittently. This is his baseline at [facility] as well. He is at his baseline confusion. Knows he is in the hospital. He states he has tummy pain (nothing on exam). He cannot elaborate. No chest pain, sob, n/v    -Data " reviewed today: I reviewed all new labs and imaging results over the last 24 hours. I personally reviewed .    Physical Exam   Temp: 97.8  F (36.6  C) Temp src: Axillary BP: 152/68 Pulse: (!) 38 Heart Rate: 44 Resp: 24 SpO2: 92 % O2 Device: None (Room air)    There were no vitals filed for this visit.  Vital Signs with Ranges  Temp:  [95.1  F (35.1  C)-97.8  F (36.6  C)] 97.8  F (36.6  C)  Pulse:  [38] 38  Heart Rate:  [34-70] 44  Resp:  [20-40] 24  BP: (125-189)/(54-86) 152/68  SpO2:  [92 %-97 %] 92 %  I/O last 3 completed shifts:  In: 1646 [I.V.:1646]  Out: 410 [Urine:410]    Constitutional: Awake, confused   Respiratory: Clear to auscultation bilaterally, no crackles or wheezing   Cardiovascular: Regular rate and rhythm, normal S1 and S2, and no murmur noted   Abdomen: Normal bowel sounds, soft, non-distended, non-tender (no tenderness even where he states he has pain)   Skin: No rashes, no cyanosis, dry to touch   Neuro: Alert and oriented x1, no weakness, numbness, memory loss   Extremities: No edema, normal range of motion   Other(s):        All other systems: Negative     Medications     NaCl 100 mL/hr at 05/23/17 0739       hydrochlorothiazide  25 mg Oral Daily     senna-docusate  1-2 tablet Oral BID     acetaminophen (TYLENOL) tablet 1,000 mg  1,000 mg Oral TID     LORazepam  0.5 mg Oral BID     oxyCODONE (ROXICODONE) IR tablet 5 mg  5 mg Oral 4x Daily     polyethylene glycol  17 g Oral Daily       Data     Recent Labs  Lab 05/23/17  0559 05/22/17  0230   WBC 13.0* 13.2*   HGB 10.1* 9.7*   MCV 98 95    308   * 142   POTASSIUM 5.3 4.2   CHLORIDE 116* 112*   CO2 19* 21   BUN 73* 57*   CR 2.06* 1.81*   ANIONGAP 11 9   JENNA 10.6* 10.9*   * 142*   TROPI  --  0.044       No results found for this or any previous visit (from the past 24 hour(s)).

## 2017-05-24 ENCOUNTER — APPOINTMENT (OUTPATIENT)
Dept: CARDIOLOGY | Facility: CLINIC | Age: 82
DRG: 683 | End: 2017-05-24
Attending: INTERNAL MEDICINE
Payer: COMMERCIAL

## 2017-05-24 ENCOUNTER — APPOINTMENT (OUTPATIENT)
Dept: SPEECH THERAPY | Facility: CLINIC | Age: 82
DRG: 683 | End: 2017-05-24
Attending: INTERNAL MEDICINE
Payer: COMMERCIAL

## 2017-05-24 LAB
ANION GAP SERPL CALCULATED.3IONS-SCNC: 10 MMOL/L (ref 3–14)
BACTERIA SPEC CULT: ABNORMAL
BASOPHILS # BLD AUTO: 0 10E9/L (ref 0–0.2)
BASOPHILS NFR BLD AUTO: 0.1 %
BUN SERPL-MCNC: 85 MG/DL (ref 7–30)
CALCIUM SERPL-MCNC: 10.7 MG/DL (ref 8.5–10.1)
CHLORIDE SERPL-SCNC: 112 MMOL/L (ref 94–109)
CO2 SERPL-SCNC: 19 MMOL/L (ref 20–32)
CREAT SERPL-MCNC: 1.95 MG/DL (ref 0.66–1.25)
DIFFERENTIAL METHOD BLD: ABNORMAL
EOSINOPHIL # BLD AUTO: 0 10E9/L (ref 0–0.7)
EOSINOPHIL NFR BLD AUTO: 0 %
ERYTHROCYTE [DISTWIDTH] IN BLOOD BY AUTOMATED COUNT: 14.6 % (ref 10–15)
GFR SERPL CREATININE-BSD FRML MDRD: 33 ML/MIN/1.7M2
GLUCOSE SERPL-MCNC: 130 MG/DL (ref 70–99)
HCT VFR BLD AUTO: 30.5 % (ref 40–53)
HGB BLD-MCNC: 9.6 G/DL (ref 13.3–17.7)
IMM GRANULOCYTES # BLD: 0.3 10E9/L (ref 0–0.4)
IMM GRANULOCYTES NFR BLD: 1.8 %
INTERPRETATION ECG - MUSE: NORMAL
LACTATE BLD-SCNC: 2.7 MMOL/L (ref 0.7–2.1)
LYMPHOCYTES # BLD AUTO: 1.7 10E9/L (ref 0.8–5.3)
LYMPHOCYTES NFR BLD AUTO: 11.5 %
Lab: ABNORMAL
MCH RBC QN AUTO: 30.4 PG (ref 26.5–33)
MCHC RBC AUTO-ENTMCNC: 31.5 G/DL (ref 31.5–36.5)
MCV RBC AUTO: 97 FL (ref 78–100)
MICRO REPORT STATUS: ABNORMAL
MICROORGANISM SPEC CULT: ABNORMAL
MICROORGANISM SPEC CULT: ABNORMAL
MONOCYTES # BLD AUTO: 1.7 10E9/L (ref 0–1.3)
MONOCYTES NFR BLD AUTO: 11.3 %
NEUTROPHILS # BLD AUTO: 11.1 10E9/L (ref 1.6–8.3)
NEUTROPHILS NFR BLD AUTO: 75.3 %
NRBC # BLD AUTO: 0 10*3/UL
NRBC BLD AUTO-RTO: 0 /100
PLATELET # BLD AUTO: 280 10E9/L (ref 150–450)
POTASSIUM SERPL-SCNC: 4.7 MMOL/L (ref 3.4–5.3)
RBC # BLD AUTO: 3.16 10E12/L (ref 4.4–5.9)
SODIUM SERPL-SCNC: 141 MMOL/L (ref 133–144)
SPECIMEN SOURCE: ABNORMAL
TROPONIN I SERPL-MCNC: 0.06 UG/L (ref 0–0.04)
WBC # BLD AUTO: 14.7 10E9/L (ref 4–11)

## 2017-05-24 PROCEDURE — 99233 SBSQ HOSP IP/OBS HIGH 50: CPT | Performed by: INTERNAL MEDICINE

## 2017-05-24 PROCEDURE — 12000007 ZZH R&B INTERMEDIATE

## 2017-05-24 PROCEDURE — 85025 COMPLETE CBC W/AUTO DIFF WBC: CPT | Performed by: HOSPITALIST

## 2017-05-24 PROCEDURE — 25000132 ZZH RX MED GY IP 250 OP 250 PS 637: Performed by: PHYSICIAN ASSISTANT

## 2017-05-24 PROCEDURE — 36415 COLL VENOUS BLD VENIPUNCTURE: CPT | Performed by: INTERNAL MEDICINE

## 2017-05-24 PROCEDURE — 83605 ASSAY OF LACTIC ACID: CPT | Performed by: INTERNAL MEDICINE

## 2017-05-24 PROCEDURE — 27210995 ZZH RX 272: Performed by: PHYSICIAN ASSISTANT

## 2017-05-24 PROCEDURE — 93010 ELECTROCARDIOGRAM REPORT: CPT | Performed by: INTERNAL MEDICINE

## 2017-05-24 PROCEDURE — 93306 TTE W/DOPPLER COMPLETE: CPT | Mod: 26 | Performed by: INTERNAL MEDICINE

## 2017-05-24 PROCEDURE — 40000916 ZZH STATISTIC SITTER, NIGHT HOURS

## 2017-05-24 PROCEDURE — 84484 ASSAY OF TROPONIN QUANT: CPT | Performed by: INTERNAL MEDICINE

## 2017-05-24 PROCEDURE — 40000914 ZZH STATISTIC SITTER, DAY HOURS

## 2017-05-24 PROCEDURE — 92610 EVALUATE SWALLOWING FUNCTION: CPT | Mod: GN

## 2017-05-24 PROCEDURE — 25000128 H RX IP 250 OP 636: Performed by: INTERNAL MEDICINE

## 2017-05-24 PROCEDURE — 40000915 ZZH STATISTIC SITTER, EVENING HOURS

## 2017-05-24 PROCEDURE — 40000225 ZZH STATISTIC SLP WARD VISIT

## 2017-05-24 PROCEDURE — 80048 BASIC METABOLIC PNL TOTAL CA: CPT | Performed by: HOSPITALIST

## 2017-05-24 PROCEDURE — 25000132 ZZH RX MED GY IP 250 OP 250 PS 637: Performed by: INTERNAL MEDICINE

## 2017-05-24 PROCEDURE — 40000275 ZZH STATISTIC RCP TIME EA 10 MIN

## 2017-05-24 PROCEDURE — 93005 ELECTROCARDIOGRAM TRACING: CPT

## 2017-05-24 PROCEDURE — 93306 TTE W/DOPPLER COMPLETE: CPT

## 2017-05-24 PROCEDURE — 36415 COLL VENOUS BLD VENIPUNCTURE: CPT | Performed by: HOSPITALIST

## 2017-05-24 PROCEDURE — 99221 1ST HOSP IP/OBS SF/LOW 40: CPT | Mod: 25 | Performed by: INTERNAL MEDICINE

## 2017-05-24 RX ORDER — HYDRALAZINE HYDROCHLORIDE 10 MG/1
10 TABLET, FILM COATED ORAL EVERY 8 HOURS SCHEDULED
Status: DISCONTINUED | OUTPATIENT
Start: 2017-05-24 | End: 2017-05-27

## 2017-05-24 RX ORDER — SODIUM CHLORIDE 9 MG/ML
INJECTION, SOLUTION INTRAVENOUS CONTINUOUS
Status: DISCONTINUED | OUTPATIENT
Start: 2017-05-24 | End: 2017-05-26

## 2017-05-24 RX ORDER — CEFTRIAXONE 1 G/1
1 INJECTION, POWDER, FOR SOLUTION INTRAMUSCULAR; INTRAVENOUS EVERY 24 HOURS
Status: DISCONTINUED | OUTPATIENT
Start: 2017-05-24 | End: 2017-05-30

## 2017-05-24 RX ORDER — BISACODYL 10 MG
10 SUPPOSITORY, RECTAL RECTAL DAILY PRN
Status: DISCONTINUED | OUTPATIENT
Start: 2017-05-24 | End: 2017-06-01 | Stop reason: HOSPADM

## 2017-05-24 RX ORDER — CALCIUM CARBONATE 500 MG/1
1000 TABLET, CHEWABLE ORAL 2 TIMES DAILY PRN
Status: DISCONTINUED | OUTPATIENT
Start: 2017-05-24 | End: 2017-06-01 | Stop reason: HOSPADM

## 2017-05-24 RX ORDER — PANTOPRAZOLE SODIUM 40 MG/1
40 TABLET, DELAYED RELEASE ORAL EVERY MORNING
Status: DISCONTINUED | OUTPATIENT
Start: 2017-05-24 | End: 2017-05-30

## 2017-05-24 RX ORDER — POLYETHYLENE GLYCOL 3350 17 G/17G
17 POWDER, FOR SOLUTION ORAL DAILY PRN
Status: DISCONTINUED | OUTPATIENT
Start: 2017-05-24 | End: 2017-06-01 | Stop reason: HOSPADM

## 2017-05-24 RX ORDER — LEVOFLOXACIN 5 MG/ML
250 INJECTION, SOLUTION INTRAVENOUS EVERY 24 HOURS
Status: DISCONTINUED | OUTPATIENT
Start: 2017-05-24 | End: 2017-05-24

## 2017-05-24 RX ADMIN — HYDRALAZINE HYDROCHLORIDE 10 MG: 10 TABLET, FILM COATED ORAL at 14:18

## 2017-05-24 RX ADMIN — SODIUM CHLORIDE: 4.5 INJECTION, SOLUTION INTRAVENOUS at 01:55

## 2017-05-24 RX ADMIN — SODIUM CHLORIDE: 9 INJECTION, SOLUTION INTRAVENOUS at 23:51

## 2017-05-24 RX ADMIN — ACETAMINOPHEN 1000 MG: 500 TABLET, FILM COATED ORAL at 14:18

## 2017-05-24 RX ADMIN — LORAZEPAM 0.5 MG: 2 CONCENTRATE ORAL at 08:41

## 2017-05-24 RX ADMIN — OXYCODONE HYDROCHLORIDE 5 MG: 5 TABLET ORAL at 20:04

## 2017-05-24 RX ADMIN — HYDRALAZINE HYDROCHLORIDE 10 MG: 10 TABLET, FILM COATED ORAL at 21:39

## 2017-05-24 RX ADMIN — SENNOSIDES AND DOCUSATE SODIUM 2 TABLET: 8.6; 5 TABLET ORAL at 08:40

## 2017-05-24 RX ADMIN — LORAZEPAM 0.5 MG: 2 CONCENTRATE ORAL at 20:08

## 2017-05-24 RX ADMIN — ACETAMINOPHEN 1000 MG: 500 TABLET, FILM COATED ORAL at 08:40

## 2017-05-24 RX ADMIN — OXYCODONE HYDROCHLORIDE 5 MG: 5 TABLET ORAL at 08:41

## 2017-05-24 RX ADMIN — ACETAMINOPHEN 1000 MG: 500 TABLET, FILM COATED ORAL at 20:04

## 2017-05-24 RX ADMIN — SODIUM CHLORIDE: 9 INJECTION, SOLUTION INTRAVENOUS at 14:17

## 2017-05-24 RX ADMIN — LORAZEPAM 0.5 MG: 2 CONCENTRATE ORAL at 02:25

## 2017-05-24 RX ADMIN — OXYCODONE HYDROCHLORIDE 5 MG: 5 TABLET ORAL at 11:47

## 2017-05-24 RX ADMIN — SODIUM CHLORIDE 500 ML: 9 INJECTION, SOLUTION INTRAVENOUS at 22:30

## 2017-05-24 RX ADMIN — OXYCODONE HYDROCHLORIDE 5 MG: 5 TABLET ORAL at 15:58

## 2017-05-24 RX ADMIN — SENNOSIDES AND DOCUSATE SODIUM 2 TABLET: 8.6; 5 TABLET ORAL at 20:04

## 2017-05-24 RX ADMIN — LEVOFLOXACIN 250 MG: 5 INJECTION, SOLUTION INTRAVENOUS at 08:42

## 2017-05-24 RX ADMIN — PANTOPRAZOLE SODIUM 40 MG: 40 TABLET, DELAYED RELEASE ORAL at 08:40

## 2017-05-24 RX ADMIN — LORAZEPAM 0.5 MG: 2 CONCENTRATE ORAL at 14:45

## 2017-05-24 RX ADMIN — CEFTRIAXONE SODIUM 1 G: 1 INJECTION, POWDER, FOR SOLUTION INTRAMUSCULAR; INTRAVENOUS at 21:56

## 2017-05-24 ASSESSMENT — ACTIVITIES OF DAILY LIVING (ADL)
AMBULATION: 4-->COMPLETELY DEPENDENT
RETIRED_COMMUNICATION: 0-->UNDERSTANDS/COMMUNICATES WITHOUT DIFFICULTY
TRANSFERRING: 4-->COMPLETELY DEPENDENT
SWALLOWING: 2-->DIFFICULTY SWALLOWING LIQUIDS
RETIRED_EATING: 2-->ASSISTIVE PERSON
DRESS: 2-->ASSISTIVE PERSON
FALL_HISTORY_WITHIN_LAST_SIX_MONTHS: NO
BATHING: 2-->ASSISTIVE PERSON
COGNITION: 2 - DIFFICULTY WITH ORGANIZING THOUGHTS
TOILETING: 4-->COMPLETELY DEPENDENT

## 2017-05-24 ASSESSMENT — PAIN DESCRIPTION - DESCRIPTORS: DESCRIPTORS: BURNING;DISCOMFORT

## 2017-05-24 NOTE — PROGRESS NOTES
[facility]  Hospitalist Progress Note  Hardeep Man, DO 05/24/2017    Reason for Stay (Diagnosis): bradycardia         Assessment and Plan:      Summary of Stay: Edil Lopez is a 85 year old male admitted on 5/22/2017 with bradycardia.    Problem List:   1. Bradycardia.  Metoprolol on hold.  Troponin level very minimally elevated at 0.064.  Cardiology input appreciated.    2. UTI.  Restart IV Levaquin.  Urine culture with two strains of E coli.  Awaiting sensitivities.    3. Urine retention.  Chronic.  Appreciate Urology input.  Ortiz cath in place.    4. Dysphagia.  Speech path consult.  5. Acute on chronic kidney failure.  Creatinine 1.95 today.  Stop HCTZ.  Avoid nephrotoxins.  Change IV fluids to NS at 75 cc/hour.  6. GERD.  Start Protonix.  TUMS PRN.  7. HTN.  Stop HCTZ.  Start Hydralazine 10 mg TID.  DVT Prophylaxis: Pneumatic Compression Devices  Code Status: DNR / DNI  Discharge Dispo: TBD.  Estimated Disch Date / # of Days until Disch: 2-3        Interval History (Subjective):      Having burning central chest pain this morning.  No SOB, F/C, N/V, or diarrhea.                  Physical Exam:      Last Vital Signs:  /66 (BP Location: Right arm)  Pulse (!) 38  Temp 96.2  F (35.7  C) (Axillary)  Resp 20  Wt 75.4 kg (166 lb 3.2 oz)  SpO2 97%  BMI 26.83 kg/m2    Gen:  NAD, A&Ox1 to person/self only.  Trouble with time and place.  Eyes:  PERRL, sclera anicteric.  OP:  MMM, no lesions.  Neck:  Supple.  CV:  Regular, kelly, no murmurs.  Lung:  CTA b/l, normal effort.  Ab:  +BS, soft.  Skin:  Warm, dry to touch.  No rash.  Ext:  No pitting edema LE b/l.           Medications:      All current medications were reviewed with changes reflected in problem list.         Data:      All new lab and imaging data was reviewed.   Labs:    Recent Labs  Lab 05/24/17  0550      POTASSIUM 4.7   CHLORIDE 112*   CO2 19*   ANIONGAP 10   *   BUN 85*   CR 1.95*   GFRESTIMATED 33*    GFRESTBLACK 40*   JENNA 10.7*       Recent Labs  Lab 05/24/17  0550   WBC 14.7*   HGB 9.6*   HCT 30.5*   MCV 97         Imaging:   No results found for this or any previous visit (from the past 24 hour(s)).

## 2017-05-24 NOTE — PLAN OF CARE
Problem: Goal Outcome Summary  Goal: Goal Outcome Summary  SLP: Bedside swallow eval completed per MD orders. Pt presents with mild-moderate oropharyngeal dysphagia in the setting of generalized weakness and poor cognitive status. Pt unable to follow commands to complete oral [facility] exam. Pt tolerated thin liquids via cup and straw and pureed textures with no overt s/sx of aspiration. Semisolid textures resulted in prolonged time for mastication which resulted in minimal residuals on tongue; pt with overt s/sx of aspiration marked by cough x1. Recommend downgrade to dysphagia diet 1 and thin liquids with 1:1 supervision. Pt should be fully upright for all PO, take small single sips/bites, pace self, and alternate consistencies. Pills to be served in small amount of puree. ST to continue to follow for diet tolerance and advanced trials as appropriate. Anticipate pt will require ongoing ST for dysphagia upon discharge to next level of care.

## 2017-05-24 NOTE — CONSULTS
[facility]    CARDIOLOGY CONSULT    Date of Admission:  5/22/2017  Date of Consult: May 24, 2017    ASSESSMENT:  85-year-old male seen for junctional bradycardia.  It is unclear how long he has had a heart rate in the 40s or a junctional rhythm.  Suspect this could be related to his very high dose of Toprol.  However heart rate has not really improved with greater than 48 hours of it being held (half-life is up to 9 hours).  Otherwise he is hemodynamically stable with no evidence of hypoperfusion.  There are no reports of lightheadedness or syncope.    Echocardiogram will be done to assess ejection fraction and valves.  There is no real suspicion for ischemia or acute coronary syndrome at this point.  With his advanced age and advanced dementia, ideally this can be treated conservatively in the near future.  It would not be surprising if his heart rate comes up in the next few days or more with holding the metoprolol.  There is no immediate indication for pacemaker, but if he remains having a heart rate of 40s with a junctional rhythm, it may be indicated.    RECOMMENDATIONS:  1. Junctional bradycardia with heart rate around 40  - Echocardiogram  - Continue to hold Toprol  - Would not recommend pacemaker at this time, but he probably should be watched one or 2 more days as an inpatient  - If he remains otherwise stable with a heart rate in the 40s, he could have an outpatient monitor in a few weeks to assess for any pauses or more profound bradycardia.  If that were found, this would be a stronger indication for pacemaker    Thank you for this consult.  We will continue to follow along.    Albaro Moser MD  Cardiology - [facility]  Pager:  [phone]  Text Page  May 24, 2017    CODE STATUS:  DNR/DNI    REASON FOR CONSULT: bradycardia    PRIMARY CARE PHYSICIAN:  Shirley Toscano    HISTORY OF PRESENT ILLNESS:  85-year-old male with dementia and no cardiac history is seen for  bradycardia.    Echocardiogram and nuclear stress test in 2011 were normal.  Holter monitor from 2011 showed sinus rhythm with variable P-wave morphology, average heart rate 76, 3% PVCs, 10% PACs, SVT runs up to 12 beats.    At baseline patient lives in a memory care unit, he has at least moderate dementia.  He needs assistance with all of his ADLs including eating meals.  History today is very limited, he cannot recall any details of his medical history, he cannot remember where he lives.    He has a history of urinary retention and had a supra pubic catheter.  This became dislodged and he presented to the emergency department for this issue.  He was found to be in a junctional rhythm with heart rate around 40, blood pressure was 160/100, there was no evidence of hypo-perfusion.  There is no reported lightheadedness, dizziness, or syncope.    He had been on Toprol 200 mg b.i.d. since at least 2014.  This has been held since admission on May 22.  Heart rate has remained in a junctional rhythm ranging from 37-45 with no clear increase over the past 48 hours.  There has been no reports of significant shortness of breath or chest pain.    In review of older notes, heart rate was in the 70s to 80s in 2014.  As noted above heart rate was in the 70s back in 2011.    PAST MEDICAL HISTORY:  1.  Anemia  2.  BPH  3.  CVA  4.  Chronic kidney disease next line 5.  Gastric reflux  6.  Hypothyroidism  7.  Hypertension  8.  Peripheral vascular disease  9.  Dementia    HOME MEDICATIONS:  Prior to Admission Medications   Prescriptions Last Dose Informant Patient Reported? Taking?   Acetaminophen (TYLENOL PO) 5/21/2017 at Unknown time  Yes Yes   Sig: Take 1,000 mg by mouth 3 times daily 0800, 1200, 1800   LORazepam (LORAZEPAM INTENSOL) 2 MG/ML (HIGH CONC) solution   Yes Yes   Sig: Take 0.5 mg by mouth every 4 hours as needed for anxiety   LORazepam (LORAZEPAM INTENSOL) 2 MG/ML (HIGH CONC) solution 5/21/2017 at Unknown time  Yes Yes    Sig: Take 0.5 mg by mouth 2 times daily   METOPROLOL SUCCINATE ER PO 5/21/2017 at Unknown time  Yes Yes   Sig: Take 200 mg by mouth 2 times daily Hold & call MD if AP <60 pr SBP <100   OXYCODONE HCL PO   Yes Yes   Sig: Take 5 mg by mouth every 4 hours as needed   OXYCODONE HCL PO 5/21/2017 at Unknown time  Yes Yes   Sig: Take 5 mg by mouth 4 times daily 0100, 0800, 1400, 2000   RANITIDINE HCL PO 5/21/2017 at Unknown time  Yes Yes   Sig: Take 75 mg by mouth 2 times daily   magnesium hydroxide (MILK OF MAGNESIA) 400 MG/5ML suspension   Yes Yes   Sig: Take 30 mLs by mouth daily as needed for constipation or heartburn   polyethylene glycol (MIRALAX/GLYCOLAX) Packet 5/21/2017 at Unknown time  Yes Yes   Sig: Take 17 g by mouth daily   senna-docusate (SENOKOT-S;PERICOLACE) 8.6-50 MG per tablet 5/21/2017 at Unknown time  Yes Yes   Sig: Take 2 tablets by mouth daily   sennosides (SENOKOT) 8.6 MG tablet   Yes Yes   Sig: Take 2 tablets by mouth every 12 hours as needed for constipation   sennosides (SENOKOT) 8.6 MG tablet 5/21/2017 at Unknown time  Yes Yes   Sig: Take 2 tablets by mouth At Bedtime      Facility-Administered Medications: None       ALLERGIES:  Allergies   Allergen Reactions     Lisinopril      Penicillins        SOCIAL HISTORY:  I have reviewed this patient's social history and updated it with pertinent information if needed. Edil Lopez  reports that he has quit smoking. He does not have any smokeless tobacco history on file. He reports that he does not drink alcohol or use illicit drugs.    FAMILY HISTORY:  I have reviewed this patient's family history and updated it with pertinent information if needed.   No family history on file.    REVIEW OF SYSTEMS:  Unobtainable due to dementia.    PHYSICAL EXAM:  Temp: 96.2  F (35.7  C) Temp src: Axillary BP: 171/66 (MD aware of HTN-cardiology consult ordered)  Heart Rate: 44 (MD aware-cardiology consult ordered) Resp: 20 SpO2: 97 % O2 Device: None (Room air)     Vital Signs with Ranges  Temp:  [95.4  F (35.2  C)-96.2  F (35.7  C)] 96.2  F (35.7  C)  Heart Rate:  [41-66] 44  Resp:  [18-22] 20  BP: (130-177)/(57-81) 171/66  SpO2:  [90 %-97 %] 97 %  166 lbs 3.2 oz    Constitutional: awake, alert, no distress  Eyes: PERRL, sclera nonicteric  ENT: trachea midline  Respiratory: lungs clear  Cardiovascular: regular, bradycardic, no murmur  GI: nondistended, nontender, bowel sounds present  Lymph/Hematologic: no lymphadenopathy  Skin: dry, no rash  Musculoskeletal: good muscle tone, strength 5/5 in upper and lower extremities  Neurologic: no focal deficits  Neuropsychiatric: appropriate affact    Intake/Output Summary (Last 24 hours) at 05/24/17 0860  Last data filed at 05/24/17 0629   Gross per 24 hour   Intake             1165 ml   Output              650 ml   Net              515 ml     DATA:  Labs: Potassium 4.7, bicarbonate 19, BUN 85, creatinine 1.95, lactate 2.0, troponin 0.04, white count 14, hemoglobin 9.6, platelets 280    EKG: May 22, 2017: Junctional rhythm, rate 40  May 24, 2017: Junctional rhythm, rate 47    Telemetry: Junctional rhythm, heart rate between 37 and 45, there has not been any significant increase in heart rate since admission on May 22 until this a.m., no significant positives    CXR: May 22: No airspace disease, no pleural effusion    CT abdomen pelvis, May 22: Large nonobstructing bladder stone measuring 4 cm, nonobstructing 1.2 cm stone in the lower pole of the right kidney

## 2017-05-24 NOTE — PLAN OF CARE
Problem: Arrhythmia/Dysrhythmia (Symptomatic) (Adult)  Goal: Signs and Symptoms of Listed Potential Problems Will be Absent or Manageable (Arrhythmia/Dysrhythmia)  Signs and symptoms of listed potential problems will be absent or manageable by discharge/transition of care (reference Arrhythmia/Dysrhythmia (Symptomatic) (Adult) CPG).  Outcome: No Change  PSC at bedside. Hypertensive with low HR. Tele: JR. Ortiz in place. Up with lift. Speech consult placed-diet changed from DD2 to DD1. IV Levaquin started this AM for positive UC. Trop 0.064. Protonix and tums ordered and given for reflux. Cardiology consult ordered. Echo performed-see results review.

## 2017-05-24 NOTE — PLAN OF CARE
Problem: Individualization  Goal: Patient Preferences  Outcome: No Change  Goal: Discharge Planning (Adult, OB, Behavioral, Peds)  PRIMARY DIAGNOSIS: Catheter placement, bradycardia, DWAIN      OUTPATIENT/OBSERVATION GOALS TO BE MET BEFORE DISCHARGE:      1. Orthostatic symptoms (BP decrease or HR increase with patient upright)? N/A  2. Vital Signs stable? HR 30-40s  3. Documented urine output: Catheter in place  4. Tolerating PO fluid: Yes  5. Symptoms improved: Yes  6. Labs WNL? Cr 2.06, Na 146, WBC 13.2  7. ADLs back to baseline? Yes  8. Activity and level of assistance: Up with maximum assistance, lift at baseline  9. Pain status: Improved-controlled with oral pain medications.  10. Barriers to discharge noted No      Interpretation of rhythm per telemetry tech: Junctional rhythm 30-40s      Pt transferred to MS3. Alert to self, baseline Continues to call out for help. Scheduled oxycodone as ordered. Repositioned q 2 hrs. Has intermittent periods of lamaze type breathing. Appears to be due to frustration. Started on HCTZ. Will monitor BPs. Bedside attended with patient to prevent pulling lines.

## 2017-05-24 NOTE — DOWNTIME EVENT NOTE
The EMR was down for 3.25 hours on 5/24/2017.    Teagan Bryan RN was responsible for completing the paper charting during this time period.     The following information was re-entered into the system by Teagan Bryan: Flowsheet data, Intake and output and MAR    The following information will remain in the paper chart: none    Teagan Bryan  5/24/2017

## 2017-05-24 NOTE — PLAN OF CARE
End of Shift Summary.  For vital signs and complete assessments, please see documentation flowsheets.     Pertinent assessments: pt confused, alert to self at baseline. Pt with a bedside attendant for safety of self and tubes. Pt with good urine output via rutledge.   Major Shift Events: none  Plan (Upcoming Events): continue current cares  Discharge/Transfer Needs: discharge to [facility] today    Bedside Shift Report Completed :   Bedside Safety Check Completed:

## 2017-05-24 NOTE — PROGRESS NOTES
05/24/17 1401   General Information   Onset Date 05/22/17   Start of Care Date 05/24/17   Referring Physician Hardeep Mna,    Patient Profile Review/OT: Additional Occupational Profile Info See Profile for full history and prior level of function   Patient/Family Goals Statement Pt did not state   Swallowing Evaluation Bedside swallow evaluation   Behaviorial Observations Impulsive;Distractible;Confused   Mode of current nutrition Oral diet   Type of oral diet Dysphagia diet level 2;Thin liquid   Respiratory Status Room air   Comments Edil Lopez is a 85 year old male with dementia in memory care,  BPH/atonic bladder with history suprapubic cath (pulled), then rutledge placed (pulled),  who was admitted on 5/22/2017 after pulling rutledge again. Also renal failure and found to have bradycardia. Per discussion with RN, pt currently on dysphagia diet 2 and thin liquids and has been noted to choke/regurgitate on breakfast. Nursing staff reports pt is able to tolerate pureed textures however pt does not appear able to follow commands to efficiently masticate solids. Bedside swallow eval completed per MD orders to further assess oropharyngeal swallow function.    Clinical Swallow Evaluation   Oral Musculature unable to assess due to poor participation/comprehension   Additional Documentation Yes   Clinical Swallow Eval: Thin Liquid Texture Trial   Mode of Presentation, Thin Liquids cup;straw;fed by clinician   Volume of Liquid or Food Presented 4 oz   Oral Phase of Swallow WFL   Pharyngeal Phase of Swallow intact   Diagnostic Statement Pt tolerated thin liquids via cup and straw with no overt s/sx of aspiration   Clinical Swallow Eval: Puree Solid Texture Trial   Mode of Presentation, Puree spoon;fed by clinician   Volume of Puree Presented 3 tbsp   Oral Phase, Puree WFL   Pharyngeal Phase, Puree intact   Diagnostic Statement Pt tolerated pureed textures via spoon with no overt s/sx of aspiration    Clinical  Swallow Eval: Semisolid Texture Trial   Mode of Presentation, Semisolid spoon;fed by clinician   Volume of Semisolid Food Presented 1 tbsp   Oral Phase, Semisolid other (see comments)  (prolonged mastication; increased wOB)   Oral Residue, Semisolid mid posterior tongue   Pharyngeal Phase, Semisolid impaired;coughing/choking   Diagnostic Statement Semisolid textures rseulted in prolonged time for mastication which resulted in minimal residuals on tongue; pt with overt s/sx of aspiration marked by coughing    VFSS Evaluation   VFSS Additional Documentation No   FEES Evaluation   Additional Documentation No   Swallow Compensations   Swallow Compensations Alternate viscosity of consistencies;Pacing;Reduce amounts;Multiple swallow   Results Oral difficulties only   Esophageal Phase of Swallow   Patient reports or presents with symptoms of esophageal dysphagia Yes   Esophageal comments Pt with hx of GERD   General Therapy Interventions   Planned Therapy Interventions Dysphagia Treatment   Dysphagia treatment Modified diet education;Instruction of safe swallow strategies;Compensatory strategies for swallowing   Swallow Eval: Clinical Impressions   Skilled Criteria for Therapy Intervention Skilled criteria met.  Treatment indicated.   Functional Assessment Scale (FAS) 4   Treatment Diagnosis Mild-moderate oropharyngeal dysphagia   Diet texture recommendations Dysphagia diet level 1;Thin liquids   Recommended Feeding/Eating Techniques alternate between small bites and sips of food/liquid;check mouth frequently for oral residue/pocketing;hard swallow w/ each bite or sip;maintain upright posture during/after eating for 30 mins;small sips/bites   Demonstrates Need for Referral to Another Service dietitian;occupational therapy;physical therapy   Therapy Frequency 5 times/wk   Predicted Duration of Therapy Intervention (days/wks) 1 week   Anticipated Discharge Disposition extended care facility   Risks and Benefits of Treatment  have been explained. Yes   Patient, family and/or staff in agreement with Plan of Care Yes   Clinical Impression Comments SLP: Bedside swallow eval completed per MD orders. Pt presents with mild-moderate oropharyngeal dysphagia in the setting of generalized weakness and poor cognitive status. Pt unable to follow commands to complete oral [facility] exam. Pt tolerated thin liquids via cup and straw and pureed textures with no overt s/sx of aspiration. Semisolid textures resulted in prolonged time for mastication which resulted in minimal residuals on tongue; pt with overt s/sx of aspiration marked by cough x1. Recommend downgrade to dysphagia diet 1 and thin liquids with 1:1 supervision. Pt should be fully upright for all PO, take small single sips/bites, pace self, and alternate consistencies. Pills to be served in small amount of puree. ST to continue to follow for diet tolerance and advanced trials as appropriate. Anticipate pt will require ongoing ST for dysphagia upon discharge to next level of care.    Total Evaluation Time   Total Evaluation Time (Minutes) 11

## 2017-05-25 LAB
ANION GAP SERPL CALCULATED.3IONS-SCNC: 10 MMOL/L (ref 3–14)
BUN SERPL-MCNC: 71 MG/DL (ref 7–30)
CALCIUM SERPL-MCNC: 10.1 MG/DL (ref 8.5–10.1)
CHLORIDE SERPL-SCNC: 118 MMOL/L (ref 94–109)
CO2 SERPL-SCNC: 18 MMOL/L (ref 20–32)
CREAT SERPL-MCNC: 1.63 MG/DL (ref 0.66–1.25)
ERYTHROCYTE [DISTWIDTH] IN BLOOD BY AUTOMATED COUNT: 14.9 % (ref 10–15)
GFR SERPL CREATININE-BSD FRML MDRD: 40 ML/MIN/1.7M2
GLUCOSE SERPL-MCNC: 104 MG/DL (ref 70–99)
HCT VFR BLD AUTO: 30.7 % (ref 40–53)
HGB BLD-MCNC: 9.5 G/DL (ref 13.3–17.7)
LACTATE BLD-SCNC: 1.7 MMOL/L (ref 0.7–2.1)
MCH RBC QN AUTO: 30.1 PG (ref 26.5–33)
MCHC RBC AUTO-ENTMCNC: 30.9 G/DL (ref 31.5–36.5)
MCV RBC AUTO: 97 FL (ref 78–100)
PLATELET # BLD AUTO: 235 10E9/L (ref 150–450)
POTASSIUM SERPL-SCNC: 3.7 MMOL/L (ref 3.4–5.3)
RBC # BLD AUTO: 3.16 10E12/L (ref 4.4–5.9)
SODIUM SERPL-SCNC: 146 MMOL/L (ref 133–144)
WBC # BLD AUTO: 16.9 10E9/L (ref 4–11)

## 2017-05-25 PROCEDURE — 40000914 ZZH STATISTIC SITTER, DAY HOURS

## 2017-05-25 PROCEDURE — 12000007 ZZH R&B INTERMEDIATE

## 2017-05-25 PROCEDURE — 40000915 ZZH STATISTIC SITTER, EVENING HOURS

## 2017-05-25 PROCEDURE — 25000132 ZZH RX MED GY IP 250 OP 250 PS 637: Performed by: INTERNAL MEDICINE

## 2017-05-25 PROCEDURE — 25000128 H RX IP 250 OP 636: Performed by: INTERNAL MEDICINE

## 2017-05-25 PROCEDURE — 83605 ASSAY OF LACTIC ACID: CPT | Performed by: INTERNAL MEDICINE

## 2017-05-25 PROCEDURE — 40000916 ZZH STATISTIC SITTER, NIGHT HOURS

## 2017-05-25 PROCEDURE — 99231 SBSQ HOSP IP/OBS SF/LOW 25: CPT | Performed by: INTERNAL MEDICINE

## 2017-05-25 PROCEDURE — 93005 ELECTROCARDIOGRAM TRACING: CPT

## 2017-05-25 PROCEDURE — 25000132 ZZH RX MED GY IP 250 OP 250 PS 637: Performed by: PHYSICIAN ASSISTANT

## 2017-05-25 PROCEDURE — 80048 BASIC METABOLIC PNL TOTAL CA: CPT | Performed by: INTERNAL MEDICINE

## 2017-05-25 PROCEDURE — 85027 COMPLETE CBC AUTOMATED: CPT | Performed by: INTERNAL MEDICINE

## 2017-05-25 PROCEDURE — 93010 ELECTROCARDIOGRAM REPORT: CPT | Mod: 77 | Performed by: INTERNAL MEDICINE

## 2017-05-25 PROCEDURE — 99232 SBSQ HOSP IP/OBS MODERATE 35: CPT | Performed by: INTERNAL MEDICINE

## 2017-05-25 PROCEDURE — 40000275 ZZH STATISTIC RCP TIME EA 10 MIN

## 2017-05-25 PROCEDURE — 93010 ELECTROCARDIOGRAM REPORT: CPT | Performed by: INTERNAL MEDICINE

## 2017-05-25 PROCEDURE — 25000132 ZZH RX MED GY IP 250 OP 250 PS 637: Performed by: CLINICAL NURSE SPECIALIST

## 2017-05-25 PROCEDURE — 36415 COLL VENOUS BLD VENIPUNCTURE: CPT | Performed by: INTERNAL MEDICINE

## 2017-05-25 PROCEDURE — 99223 1ST HOSP IP/OBS HIGH 75: CPT | Performed by: CLINICAL NURSE SPECIALIST

## 2017-05-25 RX ORDER — HYDROMORPHONE HYDROCHLORIDE 1 MG/ML
.3-.5 INJECTION, SOLUTION INTRAMUSCULAR; INTRAVENOUS; SUBCUTANEOUS
Status: DISCONTINUED | OUTPATIENT
Start: 2017-05-25 | End: 2017-05-25

## 2017-05-25 RX ORDER — HYDROMORPHONE HCL/0.9% NACL/PF 0.2MG/0.2
0.2 SYRINGE (ML) INTRAVENOUS
Status: DISCONTINUED | OUTPATIENT
Start: 2017-05-25 | End: 2017-06-01 | Stop reason: HOSPADM

## 2017-05-25 RX ORDER — AMLODIPINE BESYLATE 10 MG/1
10 TABLET ORAL DAILY
Status: DISCONTINUED | OUTPATIENT
Start: 2017-05-25 | End: 2017-06-01 | Stop reason: HOSPADM

## 2017-05-25 RX ADMIN — LORAZEPAM 0.5 MG: 2 CONCENTRATE ORAL at 04:53

## 2017-05-25 RX ADMIN — CEFTRIAXONE SODIUM 1 G: 1 INJECTION, POWDER, FOR SOLUTION INTRAMUSCULAR; INTRAVENOUS at 22:06

## 2017-05-25 RX ADMIN — OXYCODONE HYDROCHLORIDE 5 MG: 5 TABLET ORAL at 12:06

## 2017-05-25 RX ADMIN — LORAZEPAM 0.5 MG: 2 CONCENTRATE ORAL at 20:08

## 2017-05-25 RX ADMIN — HYDRALAZINE HYDROCHLORIDE 10 MG: 10 TABLET, FILM COATED ORAL at 13:51

## 2017-05-25 RX ADMIN — POLYETHYLENE GLYCOL 3350 17 G: 17 POWDER, FOR SOLUTION ORAL at 08:44

## 2017-05-25 RX ADMIN — SENNOSIDES AND DOCUSATE SODIUM 2 TABLET: 8.6; 5 TABLET ORAL at 22:12

## 2017-05-25 RX ADMIN — AMLODIPINE BESYLATE 10 MG: 10 TABLET ORAL at 16:37

## 2017-05-25 RX ADMIN — ACETAMINOPHEN 1000 MG: 500 TABLET, FILM COATED ORAL at 13:45

## 2017-05-25 RX ADMIN — HYDRALAZINE HYDROCHLORIDE 10 MG: 10 TABLET, FILM COATED ORAL at 22:10

## 2017-05-25 RX ADMIN — PANTOPRAZOLE SODIUM 40 MG: 40 TABLET, DELAYED RELEASE ORAL at 08:44

## 2017-05-25 RX ADMIN — OXYCODONE HYDROCHLORIDE 5 MG: 5 TABLET ORAL at 08:44

## 2017-05-25 RX ADMIN — SENNOSIDES AND DOCUSATE SODIUM 2 TABLET: 8.6; 5 TABLET ORAL at 08:44

## 2017-05-25 RX ADMIN — SODIUM CHLORIDE: 9 INJECTION, SOLUTION INTRAVENOUS at 18:36

## 2017-05-25 RX ADMIN — LORAZEPAM 0.5 MG: 2 CONCENTRATE ORAL at 00:49

## 2017-05-25 RX ADMIN — ACETAMINOPHEN 1000 MG: 500 TABLET, FILM COATED ORAL at 08:43

## 2017-05-25 RX ADMIN — Medication 25 MG: at 17:04

## 2017-05-25 RX ADMIN — LORAZEPAM 0.5 MG: 2 CONCENTRATE ORAL at 10:40

## 2017-05-25 RX ADMIN — ACETAMINOPHEN 1000 MG: 500 TABLET, FILM COATED ORAL at 22:12

## 2017-05-25 RX ADMIN — Medication 0.5 MG: at 04:54

## 2017-05-25 RX ADMIN — Medication 0.3 MG: at 01:18

## 2017-05-25 RX ADMIN — HYDRALAZINE HYDROCHLORIDE 10 MG: 10 TABLET, FILM COATED ORAL at 07:08

## 2017-05-25 RX ADMIN — OXYCODONE HYDROCHLORIDE 5 MG: 5 TABLET ORAL at 16:38

## 2017-05-25 NOTE — PLAN OF CARE
/82 all other VSS.  LS clear/dim on RA.  Tele: junctional rhythm.  DD1 and thin liquid diet.  Sitter present.  Disoriented x4, moves with a lift.  NS @75mL/hr.  Ortiz with good UOP.  Bolus given on eves, finished at 1205.  Receiving IV Rocephin for UTI.  IV Dilaudid given x2, oral ativan given x2.  Quite agitated for 5-6 hrs overnight.  Resolved with 0.5 dilaudid and ativan.  Will continue to monitor.

## 2017-05-25 NOTE — PLAN OF CARE
Problem: Goal Outcome Summary  Goal: Goal Outcome Summary  Outcome: No Change  vss afebrile. Pt oriented to self at times. Pt sleepy then yelling out at other times. Sitter in room. ivf infusing. Rocephin for uti. Takes pills with applesauce.

## 2017-05-25 NOTE — PROGRESS NOTES
[facility]  Hospitalist Progress Note    Demetrio Hdez MD 05/25/2017  Text Page      Reason for Stay (Diagnosis): ortiz replacement and bradycardia         Assessment and Plan:      Summary of Stay: Edil Lopez is a 85 year old male admitted on 5/22/2017 with need for ortiz replacement by urology, DWAIN and junctional bradycardia in the 30s.  Found to have E coli UTI.  Hx of dementia, HTN, CKD.      Problem List:     1. Bradycardia.  Resolved after stopping Metoprolol.  Troponin level very minimally elevated at 0.064.  Cardiology input appreciated.    2. UTI.  On ceftriaxone since 5/24.  Urine culture with two strains of E coli both of which are resistant to levofloxacin.    3. Urine retention.  Chronic.  Appreciate Urology input.  Ortiz cath in place.    4. Dysphagia.  Speech path consulting but patient had difficulty cooperating due to dementia.  Continue pureed diet for now.  5. Acute on chronic kidney failure.  Likely due to obstruction.   Creatinine 1.69 from 1.95. Baseline around 1.4.  Stopped HCTZ.  Avoid nephrotoxins.  Continue IV fluids to NS at 75 cc/hour for today- likely stop tomorrow.   6. GERD.  Started Protonix.  TUMS PRN.  7. HTN.  Stopped HCTZ.  Started Hydralazine 10 mg TID but BP still elevated.  Will add amlodipine.    8. Dementia- apparently he has an appointed guardian      DVT Prophylaxis: Pneumatic Compression Devices  Code Status: DNR / DNI  Discharge Dispo: back to LTC tomorrow if BP and renal function improving and WBC improving    Plan is for care conference tomorrow at 11:30 with palliative care and guardian to determine care plan.  Apparently there has been consideration for comfort care plan in the past.      Discussed with palliative care.           Interval History (Subjective):      Confused, denies pain.                    Physical Exam:      Last Vital Signs:  /82 (BP Location: Right arm)  Pulse (!) 38  Temp 97.3  F (36.3  C) (Axillary)  Resp 24  Wt  76.6 kg (168 lb 14.4 oz)  SpO2 94%  BMI 27.26 kg/m2      Vital signs reviewed  General:  Alert  CV: regular rate and rhythm, no murmurs or rubs  Lungs:  Clear to ascultation bilaterally, normal respiratory effort  HEENT:  Pupil round, equal, conjuctivae, sclerae and lids normal, neck is supple  Abdomen:  Soft, nontender, nondistended, no masses, normal bowel sounds  Extremities:  No edema  Psychiatric:  confused             Medications:      All current medications were reviewed with changes reflected in problem list.         Data:      All new lab and imaging data was reviewed.   Labs:  Cr 1.6 from 1.9  Wbc 16.9 from 14.7  LA 1.7 from 2.7

## 2017-05-25 NOTE — CONSULTS
[facility]    Palliative Care Consultation   Text Page    Date of Admission:  5/22/2017    Assessment & Plan   Edil Lopez is a 85 year old male who was admitted on 5/22/2017. I was asked to see the patient for goals of care.    Recommendations:  1. Pain - tylenol 975mg q 8 hours. Hydromorphone 0.2mg IV q 3 hours prn. Oxycodone scheduled 5mg 4 times daily scheduled as from PTA. Reposition for comfort.  2. Agitation - Ativan 0.5mg BID as PTA. DC prn ativan. Seroquel 12.5-25 mg PO q 6 hours prn agitation. Promote food and fluids. Appreciate nursing assistance to keep pt awake during daytime.    Goal of Care: DNR/DNI.  MARY Rosen has been in contact with pt's care coordinator and requested legal documents regarding guardian. Confirmed with shahid Richardson, care conference with Jessika HERNANDEZ, Dr. Man and palliative care to meet at 11:30 on 5/26/17. Requested copy of most recent POLST from  [facility].    Disease Process/es & Symptoms:  Edil Lopez is a 85 year old patient admitted for rutledge catheter replacement requiring urology consult to replace in ED. Pt was also noted with symptoms of bradycardia. He has been treated for junctional rhythm, dysphagia, UTI, urinary retention, ARF with CKD and HTN.   Pt has a hx of SP catheter for BPH and urinary retention, but had been converted to rutledge catheter. His catheter had fallen out approximately 2 weeks ago and was successfully replaced by NH staff, and was examined by eZlda BARRIGA from [facility] in [place].     This is in the setting of dementia, hx of CVA, s/p carotid endarterectomy, HTN, CKD, GERD, PVD, Hypothyroidism, Hyperparathyroidism. Guardian reports that pt has had a 9# weight loss over 6 month period. He had been assessed for HOSPICE appropriateness at NH, however did not meet criteria. Guardian believes pt is comfort care at [facility] per POLST.      He has been hospitalized in the ED for rutledge catheter or SP catheter problems  and UTI  2 times in the past 9 months. In Pt's clinical record, his weight is increased 20# since 5/2016.    The following symptoms are noted as concerning to pt quality of life - per nursing staff, providers and guardian.  Pain in low abdomin  Agitation    Psychosocial/Spiritual Needs:    Oriented guardian to Spiritual Health and Social Work Services as part of Palliative Care team.  is following.    Decision-Making & Goals of Care:  Discussion/counseling today about goals of care/decisions:   5/25/17 Per [facility], pt has a guardian. [facility] has been attempting to obtain contact information and documents for much of the day.  Was contacted by shahid Calvert at 3:30 pm today. Per guardinoelle, Pt and his wife were both residents of [facility]. Wife passed away 6 months ago in Hospice. [facility] at [facility] had recommended hospice 9-10 months ago for pt, however, he did not meet criteria at that time.  Guardian states pt's baseline currently is that he is verbal, but forgetful and no short term memory. Pt is in bed when guardian sees him although [facility] staff state he gets up to a WC. Pt is incontinent of bowel. Has indwelling catheter. Requires help for cares and is fed. Shahid believes that pt is comfort care per his most recent POLST. Have requested a copy. Guardian works M-F, and only is available until 1pm on Friday. Have requested a care conference with Shahid, MARY, Dr. Man for 11:30am to review most recent POLST, pt condition, and establish POC. If pt now meets HOSPICE criteria, guardian reports that [facility] is the provider that would be selected.    Patient has decision-making capacity Unreliable  Patient has a court-appointed guardian/conservator for healthcare decisions in a legal document . See name(s) and contact information in Health Care Agent/Legal Guardian section below.    Name: Filemon Calvert, Relationship: guardian, Phone(s): [phone].  MARY Rosen is obtaining legal  guardian documents.   Have also requested most recent POLST from [facility] per Guardian.      Patient has a completed health care directive available in the chart (Y/N): N  Physician orders for life-sustaining treatment (POLST) form is on file.   Code Status: Do not resusitate / Do not intubate     Findings & plan of care discussed with: Dr. Hdez, Bedside RN. Natalie Graham.  Follow-up plan from palliative team: Will continue to follow this pt and guardian for symptom management and goals of care.  Thank you for involving us in the patient's care.     Oxana BARRIGA, CNS  Pain Management and Palliative Care  [facility]  Pgr: [phone]    Time Spent on this Encounter   I spent 60 minutes in assessment of the patient and discussion with the pt's guardian Filemon. Another 30 minutes in review of chart, documentation and discussion with the health care team.  Phone conference 3:30-4:00, 4:15-4:30pm.    Reason for Consult   Reason for consult: I was asked by Dr. Man to evaluate this patient for Goals of care  Decisional support.    Primary Care Physician   Shirley Toscano    Chief Complaint    Accidental rutledge catheter removal and inability to replace  Bradycardia    History is obtained from the electronic health record, staff and pt's guardian.    History of Present Illness   Edil Lopez is a 85 year old male who presents after accidentally removing his chronic indwelling rutledge catheter at his nursing home. The NH staff was unable to replace catheter. The pt required urology consultation in the ED to replace catheter. He was found in the ED to also have bradycardia.     Pt has a history of dementia and has a legal guardian. Pt has a history of suprapubic catheter, and now has chronic indwelling rutledge catheter for BPH and urinary retention. His guardian reports that the patient's catheter had come out approximately 2 weeks ago and was replaced but  patient had  discomfort from the catheter so his guardian took him to see Zelda BARRIGA at [facility] [place], operated by [facility] in [place] to verify that rutledge catheter was placed correctly and there were no other complications.      Past Medical History   I have reviewed this patient's medical history and updated it with pertinent information if needed.   Past Medical History:   Diagnosis Date     Anemia      BPH (benign prostatic hyperplasia)      Chronic kidney disease      CVA (cerebral infarction)      Dementia      Gastro-oesophageal reflux disease      Hyperparathyroidism (H)      Hypertension      Hypothyroidism      Neuromuscular disorder (H)      PVD (peripheral vascular disease) (H)        Past Surgical History   I have reviewed this patient's surgical history and updated it with pertinent information if needed.  Past Surgical History:   Procedure Laterality Date     APPENDECTOMY OPEN       ENDARTERECTOMY CAROTID       HERNIA REPAIR       Suprapubic catheter placement         Prior to Admission Medications   Prior to Admission Medications   Prescriptions Last Dose Informant Patient Reported? Taking?   Acetaminophen (TYLENOL PO) 5/21/2017 at Unknown time  Yes Yes   Sig: Take 1,000 mg by mouth 3 times daily 0800, 1200, 1800   LORazepam (LORAZEPAM INTENSOL) 2 MG/ML (HIGH CONC) solution   Yes Yes   Sig: Take 0.5 mg by mouth every 4 hours as needed for anxiety   LORazepam (LORAZEPAM INTENSOL) 2 MG/ML (HIGH CONC) solution 5/21/2017 at Unknown time  Yes Yes   Sig: Take 0.5 mg by mouth 2 times daily   METOPROLOL SUCCINATE ER PO 5/21/2017 at Unknown time  Yes Yes   Sig: Take 200 mg by mouth 2 times daily Hold & call MD if AP <60 pr SBP <100   OXYCODONE HCL PO   Yes Yes   Sig: Take 5 mg by mouth every 4 hours as needed   OXYCODONE HCL PO 5/21/2017 at Unknown time  Yes Yes   Sig: Take 5 mg by mouth 4 times daily 0100, 0800, 1400, 2000   RANITIDINE HCL PO 5/21/2017 at Unknown time  Yes Yes   Sig: Take 75 mg by mouth 2 times daily   magnesium  hydroxide (MILK OF MAGNESIA) 400 MG/5ML suspension   Yes Yes   Sig: Take 30 mLs by mouth daily as needed for constipation or heartburn   polyethylene glycol (MIRALAX/GLYCOLAX) Packet 5/21/2017 at Unknown time  Yes Yes   Sig: Take 17 g by mouth daily   senna-docusate (SENOKOT-S;PERICOLACE) 8.6-50 MG per tablet 5/21/2017 at Unknown time  Yes Yes   Sig: Take 2 tablets by mouth daily   sennosides (SENOKOT) 8.6 MG tablet   Yes Yes   Sig: Take 2 tablets by mouth every 12 hours as needed for constipation   sennosides (SENOKOT) 8.6 MG tablet 5/21/2017 at Unknown time  Yes Yes   Sig: Take 2 tablets by mouth At Bedtime      Facility-Administered Medications: None     Allergies   Allergies   Allergen Reactions     Lisinopril      Penicillins        Social History   I have updated and reviewed the following Social History Narrative:   Social History     Social History Narrative      Living situation: Pt lives at a nursing home. His wife passed away at the nursing home approximately 6 months ago.  Family system: Pt is . He has a son in [place], and a daughter and niece that live in [place], however pt has assigned a legal guardian to manage his health care.  Functional status (needs help with ADLs or IADLs): Pt is dependent for his cares.  Employment/education: Not assessed.  Use of community resources: Pt has a legal guardian for his health care. He lives at [facility] with full nursing care and sws support.  Activities/interests: Not assessed.  History of substance use/abuse: Not assessed.  Restorationism affiliation: IVAN  Involvement in santino community: [facility]  Impact of illness on patient: Pt is dependent for ADLS.    Family History   I have reviewed this patient's family history and updated it with pertinent information if needed.   No family history on file. Pt is unable to provide information.    Review of Systems   Pt is unable to participate in ROS due to dementia, and current sedation.    Palliative Symptom Review (0=no  symptom/no concern, 1=mild, 2=moderate, 3=severe):      Pain: 0-none      Fatigue: 0-none      Nausea: 0-none      Constipation: 0-none      Diarrhea: 0-none      Depressive Symptoms: IVAN      Anxiety: IVAN      Drowsiness: 2-moderate      Poor Appetite: 1-mild      Shortness of Breath: IVAN      Insomnia: 0-none      Other:agitation 1-mild      Overall (0 good/no concerns, 3 very poor):  2    Physical Exam   Temp:  [96.1  F (35.6  C)-97.6  F (36.4  C)] 97.5  F (36.4  C)  Heart Rate:  [45-75] 75  Resp:  [20-24] 22  BP: (145-182)/(64-97) 182/82  SpO2:  [93 %-98 %] 93 %  168 lbs 14.4 oz  GEN:  Sedated, opens eyes to voice, squeezed my hands, appears comfortable, NAD.  HEENT:  Normocephalic/atraumatic, no scleral icterus, no nasal discharge, mouth moist.  CV: Irregular, Irregular, kelly, S1, S2;  +3 DP/PT pulses bilatererally; +1 edema BLE.  RESP:  Clear to auscultation bilaterally without rales/rhonchi/wheezing/retractions.  Symmetric chest rise on inhalation noted.  Normal respiratory effort.  ABD:  Rounded, soft, non-tender/distended.  +BS  EXT:  Edema & pulses as noted above.  CMS intact x 4.     M/S:   NON-Tender to palpation.    SKIN:  Dry to touch, no exanthems noted in the visualized areas.    NEURO: Symmetric strength +2/2.  Sensation to touch intact all extremities.     Psych:  Sedated affect.  Opens eyes to name. Squeezed my hands to command. Wrinkled face when I stimulated his feet.    Delirium Screen/CAM:  Unable to completed CAM as pt is sedated.    Data   Results for orders placed or performed during the hospital encounter of 17 (from the past 24 hour(s))   Troponin I   Result Value Ref Range    Troponin I ES 0.064 (H) 0.000 - 0.045 ug/L   Echocardiogram Complete    Narrative    [id number]  [id number]  [id number]  [id number]^DAVIE^SHERRELL^[facility]  Echocardiography Laboratory  [address] [place] [address]        Name: KEELY GUILLEN  MRN: [id number]  :  07/21/1931  Study Date: 05/24/2017 09:45 AM  Age: 85 yrs  Gender: Male  Patient Location: [facility]  Reason For Study: Bradycardia - Sinus  Ordering Physician: SHERRELL BRODERICK  Referring Physician: Shirley Toscano  Performed By: Hugo Alves RDCS     BSA: 1.8 m2  Height: 66 in  Weight: 166 lb  HR: 42  BP: 171/66 mmHg  _____________________________________________________________________________  __        Procedure  Complete Portable Echo Adult.  _____________________________________________________________________________  __        Interpretation Summary     1. The left ventricle is normal in structure, function and size. The visual  ejection fraction is estimated at 60%.  2. The right ventricle is mild-moderately dilated. Low-normal RV function.  3. There is moderately severe (3+) tricuspid regurgitation.  4. Pulmonary hypertension The right ventricular systolic pressure is  approximated at 45mmHg plus the right atrial pressure.  5. There is mild (1+) aortic regurgitation.     Echo from 2011 showed mild TR, RVSP 25mmHg.  _____________________________________________________________________________  __        Left Ventricle  The left ventricle is normal in structure, function and size. There is  moderate concentric left ventricular hypertrophy. The visual ejection fraction  is estimated at 60%. Left ventricular diastolic function is indeterminate.  Normal left ventricular wall motion.     Right Ventricle  The right ventricle is mild to moderately dilated. Low-normal RV function.     Atria  The left atrium is moderately dilated. The right atrium is moderately dilated.  There is no atrial shunt seen.     Mitral Valve  There is trace mitral regurgitation.        Tricuspid Valve  There is moderately severe (3+) tricuspid regurgitation. Pulmonary  hypertension. The right ventricular systolic pressure is approximated at  45mmHg plus the right atrial pressure.     Aortic Valve  There is mild (1+) aortic regurgitation. No  aortic stenosis is present.     Pulmonic Valve  The pulmonic valve is normal in structure and function.     Vessels  Normal ascending, transverse (arch), and descending aorta. The IVC is dilated  and fails to change with respiration, suggesting elevated central venous  pressure.     Pericardium  There is no pericardial effusion.        Rhythm  Junctional rhythm.  _____________________________________________________________________________  __  MMode/2D Measurements & Calculations  RVDd: 4.0 cm  IVSd: 1.6 cm     LVIDd: 3.6 cm  LVIDs: 2.0 cm  LVPWd: 1.4 cm  FS: 43.7 %  EDV(Teich): 55.9 ml  ESV(Teich): 13.6 ml  LV mass(C)d: 201.9 grams  LV mass(C)dI: 109.2 grams/m2  Ao root diam: 3.5 cm  LA dimension: 4.8 cm  asc Aorta Diam: 3.4 cm  LA/Ao: 1.4  LVOT diam: 2.0 cm  LVOT area: 3.1 cm2  LA Volume (BP): 86.0 ml  LA Volume Index (BP): 46.5 ml/m2           Doppler Measurements & Calculations  MV E max rogerio: 74.5 cm/sec  MV dec time: 0.23 sec  AI P1/2t: 858.9 msec  TR max rogerio: 322.0 cm/sec  TR max P.5 mmHg  Lateral E/e': 9.0  Medial E/e': 11.2           _____________________________________________________________________________  __           Report approved by: Stiven Clinton 2017 01:14 PM      Lactic acid level STAT   Result Value Ref Range    Lactic Acid 2.7 (H) 0.7 - 2.1 mmol/L   Basic metabolic panel   Result Value Ref Range    Sodium 146 (H) 133 - 144 mmol/L    Potassium 3.7 3.4 - 5.3 mmol/L    Chloride 118 (H) 94 - 109 mmol/L    Carbon Dioxide 18 (L) 20 - 32 mmol/L    Anion Gap 10 3 - 14 mmol/L    Glucose 104 (H) 70 - 99 mg/dL    Urea Nitrogen 71 (H) 7 - 30 mg/dL    Creatinine 1.63 (H) 0.66 - 1.25 mg/dL    GFR Estimate 40 (L) >60 mL/min/1.7m2    GFR Estimate If Black 49 (L) >60 mL/min/1.7m2    Calcium 10.1 8.5 - 10.1 mg/dL   CBC with platelets   Result Value Ref Range    WBC 16.9 (H) 4.0 - 11.0 10e9/L    RBC Count 3.16 (L) 4.4 - 5.9 10e12/L    Hemoglobin 9.5 (L) 13.3 - 17.7 g/dL    Hematocrit 30.7  (L) 40.0 - 53.0 %    MCV 97 78 - 100 fl    MCH 30.1 26.5 - 33.0 pg    MCHC 30.9 (L) 31.5 - 36.5 g/dL    RDW 14.9 10.0 - 15.0 %    Platelet Count 235 150 - 450 10e9/L   Lactic acid whole blood   Result Value Ref Range    Lactic Acid 1.7 0.7 - 2.1 mmol/L   EKG 12-lead, tracing only   Result Value Ref Range    Interpretation ECG Click View Image link to view waveform and result

## 2017-05-25 NOTE — PROGRESS NOTES
SWS:  SW spoke with pt guardian Filemon Calvert ([phone]). Filemon will be faxing guardianship paperwork to SWS and LEYDA will place in pt chart.

## 2017-05-25 NOTE — PROGRESS NOTES
[facility]    Sepsis Evaluation Progress Note    Date of Service: 05/24/2017    I was called to see Edil Lopez due to abnormal vital signs triggering the Sepsis SIRS screening alert. He is known to have an infection.     Physical Exam    Vital Signs:  Temp: 97.6  F (36.4  C) Temp src: Axillary BP: 166/66  Heart Rate: 56 Resp: 22 SpO2: 97 % O2 Device: None (Room air)      Lab:  Lactic Acid   Date Value Ref Range Status   05/24/2017 2.7 (H) 0.7 - 2.1 mmol/L Final       The patient is at baseline mental status.    The rest of their physical exam is significant for nothing in particular.    Assessment and Plan    The SIRS and exam findings are likely due to   sepsis.     ID: The patient is currently on the following antibiotics:  Anti-infectives (Future)    Start     Dose/Rate Route Frequency Ordered Stop    05/24/17 2200  cefTRIAXone (ROCEPHIN) 1 g vial to attach to  mL bag for ADULTS or NS 50 mL bag for PEDS      1 g  over 30 Minutes Intravenous EVERY 24 HOURS 05/24/17 2150          Current antibiotic coverage is appropriate for source of infection.    Fluid: Fluid bolus ordered.    Lab: Repeat lactic acid with am labs    Disposition: The patient will remain on the current unit. We will continue to monitor this patient closely.    Demetrio Abel MD

## 2017-05-25 NOTE — PLAN OF CARE
Problem: Goal Outcome Summary  Goal: Goal Outcome Summary  ST session cancelled. Attempted to see pt x2 for dysphagia tx; first attempt pt asleep and second attempt pt calling out/agitated. RN reports good tolerance of current diet. Will follow up tomorrow as appropriate.

## 2017-05-25 NOTE — PROGRESS NOTES
[facility]    Cardiology Progress Note    ASSESSMENT:  85-year-old male seen for junctional rhythm.  It is unclear how long he was in this rhythm.  He was on Toprol 200 mg twice daily, this was discontinued on admission May 22.  No evidence of hypoperfusion related to his heart rate.  Mild lactate elevation is likely for infection and sepsis related.    He remained in junctional rhythm this morning, but heart rates are slightly higher.  This could be from the Toprol finally wearing off, but also could be some stress from his underlying infection.  Would continue to monitor.  He is not a good candidate for pacemaker at the current time with his infection.    RECOMMENDATIONS:  1.  Junctional rhythm  - Toprol discontinued  - No immediate indication for pacemaker at this time, especially in the context of infection and sepsis  - Continue conservative monitoring for now    Albaro Moser MD  Cardiology - [facility]  Pager:  [phone]  Text Page    SUBJECTIVE: he seemed to be uncomfortable and in some pain overnight, he could not verbalize specific complaints.  Slept poorly.     MEDICATIONS:  Current Facility-Administered Medications   Medication     HYDROmorphone (PF) (DILAUDID) injection 0.3-0.5 mg     calcium carbonate (TUMS) chewable tablet 1,000 mg     pantoprazole (PROTONIX) EC tablet 40 mg     bisacodyl (DULCOLAX) Suppository 10 mg     polyethylene glycol (MIRALAX/GLYCOLAX) Packet 17 g     0.9% sodium chloride infusion     hydrALAZINE (APRESOLINE) tablet 10 mg     cefTRIAXone (ROCEPHIN) 1 g vial to attach to  mL bag for ADULTS or NS 50 mL bag for PEDS     naloxone (NARCAN) injection 0.1-0.4 mg     senna-docusate (SENOKOT-S;PERICOLACE) 8.6-50 MG per tablet 1-2 tablet     ondansetron (ZOFRAN-ODT) ODT tab 4 mg    Or     ondansetron (ZOFRAN) injection 4 mg     acetaminophen (TYLENOL) tablet 1,000 mg     LORazepam (ATIVAN) 2 MG/ML (HIGH CONC) solution 0.5 mg     LORazepam (ATIVAN) 2 MG/ML (HIGH  CONC) solution 0.5 mg     oxyCODONE (ROXICODONE) IR tablet 5 mg     polyethylene glycol (MIRALAX/GLYCOLAX) Packet 17 g       ALLERGIES:  Allergies   Allergen Reactions     Lisinopril      Penicillins        REVIEW OF SYSTEMS:  General: no fatigue, weight loss  Cardiac: per HPI  Respiratory: per HPI  GI: no nausea, emesis, melena  Musculoskeletal: no weakness  Neurologic: no aphasia, paraesthesias  Neuropsychiatric: no depression    PHYSICAL EXAM:  Temp: 97.5  F (36.4  C) Temp src: Axillary BP: 182/82  Heart Rate: 75 Resp: 22 SpO2: 93 % O2 Device: None (Room air)    Vital Signs with Ranges  Temp:  [96.1  F (35.6  C)-97.6  F (36.4  C)] 97.5  F (36.4  C)  Heart Rate:  [45-75] 75  Resp:  [20-24] 22  BP: (145-182)/(64-97) 182/82  SpO2:  [93 %-98 %] 93 %  168 lbs 14.4 oz    Constitutional: awake, alert, no distress  Eyes: PERRL, sclera nonicteric  ENT: trachea midline  Respiratory: clear  Cardiovascular: kelly, regular, 2/6 holosystolic murmur at USB  GI: nondistended, nontender, bowel sounds present  Lymph/Hematologic: no lymphadenopathy  Skin: dry, no rash  Musculoskeletal: good muscle tone, strength 5/5 in upper and lower extremities  Neurologic: no focal deficits  Neuropsychiatric: appropriate affact      Intake/Output Summary (Last 24 hours) at 17 0756  Last data filed at 17 2231   Gross per 24 hour   Intake             1098 ml   Output              700 ml   Net              398 ml       DATA:  Labs: K 3.7, Cr 1.6, WBC 17, Hgb 9.5  Lab Results   Component Value Date    TROPI 0.064 (H) 2017    TROPI 0.044 2017    TROPI 0.012 2011     EK-25 AM: junctional rhythm, rate 66, no acute changes    Tele:  HR 40's yesterday, up to 55-65 today    Echo: : EF 60%, mild RVE with low-normal function, mod-sev TR, RVSP 45mmHg, mild AI

## 2017-05-25 NOTE — PLAN OF CARE
Problem: Goal Outcome Summary  Goal: Goal Outcome Summary  Alert to self only, confused, PSC at bedside, Tele Junctional Rhythm, IVF NS@75ml/hr, UCx resistant to Levaquin, text reese JARVIS, started on Rocephin IV, Ortiz patent, tolerating DD! Pureed diet, BP elevated with Hydralazine po q8h, will continue with POC.

## 2017-05-25 NOTE — PROGRESS NOTES
"Paged MD re: pain  \"pt appears in pain, writhing around and yelling out. Gave Ativan and tried ice with no results.  Pt has no PRN pain meds.  Please advise.\"  MD called and said he would put in an order for IV dilaudid.  "

## 2017-05-25 NOTE — PROGRESS NOTES
I was called because both strains of e coli that grew on UCx are resistant to quinolones (patient is on Levaquin).  I stopped Levaquin and started Rocephin.

## 2017-05-25 NOTE — PROVIDER NOTIFICATION
05/24/17 3062   Significant Event   Significant Event Other (see comments)  (Lactic acid 2.7)   MD notified

## 2017-05-26 ENCOUNTER — APPOINTMENT (OUTPATIENT)
Dept: SPEECH THERAPY | Facility: CLINIC | Age: 82
DRG: 683 | End: 2017-05-26
Payer: COMMERCIAL

## 2017-05-26 LAB
ANION GAP SERPL CALCULATED.3IONS-SCNC: 9 MMOL/L (ref 3–14)
BUN SERPL-MCNC: 47 MG/DL (ref 7–30)
CALCIUM SERPL-MCNC: 10 MG/DL (ref 8.5–10.1)
CHLORIDE SERPL-SCNC: 125 MMOL/L (ref 94–109)
CO2 SERPL-SCNC: 19 MMOL/L (ref 20–32)
CREAT SERPL-MCNC: 1.25 MG/DL (ref 0.66–1.25)
GFR SERPL CREATININE-BSD FRML MDRD: 55 ML/MIN/1.7M2
GLUCOSE SERPL-MCNC: 112 MG/DL (ref 70–99)
INTERPRETATION ECG - MUSE: NORMAL
LACTATE BLD-SCNC: 1 MMOL/L (ref 0.7–2.1)
POTASSIUM SERPL-SCNC: 3.4 MMOL/L (ref 3.4–5.3)
SODIUM SERPL-SCNC: 153 MMOL/L (ref 133–144)
WBC # BLD AUTO: 12 10E9/L (ref 4–11)

## 2017-05-26 PROCEDURE — 99232 SBSQ HOSP IP/OBS MODERATE 35: CPT | Performed by: INTERNAL MEDICINE

## 2017-05-26 PROCEDURE — 40000225 ZZH STATISTIC SLP WARD VISIT

## 2017-05-26 PROCEDURE — 36415 COLL VENOUS BLD VENIPUNCTURE: CPT | Performed by: INTERNAL MEDICINE

## 2017-05-26 PROCEDURE — 25000132 ZZH RX MED GY IP 250 OP 250 PS 637: Performed by: INTERNAL MEDICINE

## 2017-05-26 PROCEDURE — 12000007 ZZH R&B INTERMEDIATE

## 2017-05-26 PROCEDURE — 99356 ZZC PROLONGED SERV,INPATIENT,1ST HR: CPT | Performed by: CLINICAL NURSE SPECIALIST

## 2017-05-26 PROCEDURE — 40000916 ZZH STATISTIC SITTER, NIGHT HOURS

## 2017-05-26 PROCEDURE — 85048 AUTOMATED LEUKOCYTE COUNT: CPT | Performed by: INTERNAL MEDICINE

## 2017-05-26 PROCEDURE — 99233 SBSQ HOSP IP/OBS HIGH 50: CPT | Performed by: CLINICAL NURSE SPECIALIST

## 2017-05-26 PROCEDURE — 40000275 ZZH STATISTIC RCP TIME EA 10 MIN

## 2017-05-26 PROCEDURE — 25000132 ZZH RX MED GY IP 250 OP 250 PS 637: Performed by: PHYSICIAN ASSISTANT

## 2017-05-26 PROCEDURE — 25000128 H RX IP 250 OP 636: Performed by: INTERNAL MEDICINE

## 2017-05-26 PROCEDURE — 83605 ASSAY OF LACTIC ACID: CPT | Performed by: INTERNAL MEDICINE

## 2017-05-26 PROCEDURE — 93005 ELECTROCARDIOGRAM TRACING: CPT

## 2017-05-26 PROCEDURE — 25000125 ZZHC RX 250: Performed by: INTERNAL MEDICINE

## 2017-05-26 PROCEDURE — 92526 ORAL FUNCTION THERAPY: CPT | Mod: GN

## 2017-05-26 PROCEDURE — 93010 ELECTROCARDIOGRAM REPORT: CPT | Mod: 77 | Performed by: INTERNAL MEDICINE

## 2017-05-26 PROCEDURE — 80048 BASIC METABOLIC PNL TOTAL CA: CPT | Performed by: INTERNAL MEDICINE

## 2017-05-26 PROCEDURE — 93010 ELECTROCARDIOGRAM REPORT: CPT | Performed by: INTERNAL MEDICINE

## 2017-05-26 PROCEDURE — 25000132 ZZH RX MED GY IP 250 OP 250 PS 637: Performed by: CLINICAL NURSE SPECIALIST

## 2017-05-26 RX ORDER — METOPROLOL TARTRATE 1 MG/ML
2.5 INJECTION, SOLUTION INTRAVENOUS EVERY 4 HOURS PRN
Status: DISCONTINUED | OUTPATIENT
Start: 2017-05-26 | End: 2017-05-27

## 2017-05-26 RX ORDER — LORAZEPAM 2 MG/ML
.25-.5 CONCENTRATE ORAL 2 TIMES DAILY
Status: DISCONTINUED | OUTPATIENT
Start: 2017-05-26 | End: 2017-06-01 | Stop reason: HOSPADM

## 2017-05-26 RX ORDER — METOPROLOL TARTRATE 1 MG/ML
2.5 INJECTION, SOLUTION INTRAVENOUS EVERY 4 HOURS PRN
Status: DISCONTINUED | OUTPATIENT
Start: 2017-05-26 | End: 2017-05-26

## 2017-05-26 RX ORDER — LORAZEPAM 2 MG/ML
0.25 CONCENTRATE ORAL EVERY 8 HOURS PRN
Status: DISCONTINUED | OUTPATIENT
Start: 2017-05-26 | End: 2017-06-01 | Stop reason: HOSPADM

## 2017-05-26 RX ADMIN — ACETAMINOPHEN 1000 MG: 500 TABLET, FILM COATED ORAL at 21:06

## 2017-05-26 RX ADMIN — CEFTRIAXONE SODIUM 1 G: 1 INJECTION, POWDER, FOR SOLUTION INTRAMUSCULAR; INTRAVENOUS at 21:06

## 2017-05-26 RX ADMIN — HYDRALAZINE HYDROCHLORIDE 10 MG: 10 TABLET, FILM COATED ORAL at 13:50

## 2017-05-26 RX ADMIN — OXYCODONE HYDROCHLORIDE 2.5 MG: 5 TABLET ORAL at 21:06

## 2017-05-26 RX ADMIN — LORAZEPAM 0.5 MG: 2 CONCENTRATE ORAL at 08:51

## 2017-05-26 RX ADMIN — PANTOPRAZOLE SODIUM 40 MG: 40 TABLET, DELAYED RELEASE ORAL at 08:51

## 2017-05-26 RX ADMIN — LORAZEPAM 0.26 MG: 2 CONCENTRATE ORAL at 21:07

## 2017-05-26 RX ADMIN — METOPROLOL TARTRATE 2.5 MG: 1 INJECTION, SOLUTION INTRAVENOUS at 05:19

## 2017-05-26 RX ADMIN — HYDRALAZINE HYDROCHLORIDE 10 MG: 10 TABLET, FILM COATED ORAL at 21:42

## 2017-05-26 RX ADMIN — OXYCODONE HYDROCHLORIDE 5 MG: 5 TABLET ORAL at 08:51

## 2017-05-26 RX ADMIN — AMLODIPINE BESYLATE 10 MG: 10 TABLET ORAL at 08:51

## 2017-05-26 RX ADMIN — ACETAMINOPHEN 1000 MG: 500 TABLET, FILM COATED ORAL at 08:51

## 2017-05-26 RX ADMIN — ACETAMINOPHEN 1000 MG: 500 TABLET, FILM COATED ORAL at 13:50

## 2017-05-26 RX ADMIN — HYDRALAZINE HYDROCHLORIDE 10 MG: 10 TABLET, FILM COATED ORAL at 06:26

## 2017-05-26 RX ADMIN — SENNOSIDES AND DOCUSATE SODIUM 2 TABLET: 8.6; 5 TABLET ORAL at 21:06

## 2017-05-26 NOTE — PLAN OF CARE
Problem: Arrhythmia/Dysrhythmia (Symptomatic) (Adult)  Goal: Signs and Symptoms of Listed Potential Problems Will be Absent or Manageable (Arrhythmia/Dysrhythmia)  Signs and symptoms of listed potential problems will be absent or manageable by discharge/transition of care (reference Arrhythmia/Dysrhythmia (Symptomatic) (Adult) CPG).   Outcome: No Change  Hypertensive-continues on norvasc and hydralazine. Cardiology following for JR and Afib. Tele: Afib with PVC's, ST depression. Disoriented to place, time, situation. Up with lift. Total feed. Na+ 153- IVF dc'd. Angel in place for retention.

## 2017-05-26 NOTE — PROGRESS NOTES
[facility]  Hospitalist Progress Note  Hardeep Man, DO 05/26/2017    Reason for Stay (Diagnosis): Bradycardia         Assessment and Plan:      Summary of Stay: Edil Lopez is a 85 year old male admitted on 5/22/2017 with need for rutledge replacement by urology, DWAIN and junctional bradycardia in the 30s.  Found to have E coli UTI.  Hx of dementia, HTN, CKD.       Problem List:      1. Bradycardia.  Resolved after stopping Metoprolol.  Troponin level very minimally elevated at 0.064.  Cardiology input appreciated.  Will need Cardiology follow up as outpatient.  2. UTI.  On ceftriaxone since 5/24.  Urine culture with two strains of E coli both of which are resistant to levofloxacin.    3. Urine retention.  Chronic.  Appreciate Urology input.  Rutledge cath in place.    4. Dysphagia.  Speech path consulting but patient had difficulty cooperating due to dementia.  Continue pureed diet for now.  5. Acute on chronic kidney failure.  Likely due to obstruction.   Creatinine 1.25 today and back to baseline.  Stop IV fluids.  6. Hypernatremia.  Stop NS IV fluids.  Encourage free water intake.  Will need to remain in the hospital to ensure this improves by tomorrow.    7. GERD.  Protonix.  TUMS PRN.  8. HTN.  Norvasc 10 mg/day.  Hydralazine 10 mg TID but BP still elevated.    9. Chronic pain syndrome.  Pain team following.    10. Dementia.  Chronic.        DVT Prophylaxis: Pneumatic Compression Devices  Code Status: DNR / DNI  Discharge Dispo: LTC in 1-2 days        Interval History (Subjective):      Denies CP, SOB, F/C, N/V, or diarrhea.                  Physical Exam:      Last Vital Signs:  /74 (BP Location: Right arm)  Pulse 92  Temp 97.2  F (36.2  C) (Axillary)  Resp 22  Wt 76.6 kg (168 lb 14.4 oz)  SpO2 92%  BMI 27.26 kg/m2    Gen:  NAD, A&Ox1 to self.  Trouble with time and place.  Eyes:  PERRL, sclera anicteric.  OP:  MMM, no lesions.  Neck:  Supple.  CV:  Regular, no murmurs.  Lung:  CTA  b/l, normal effort.  Ab:  +BS, soft.  Skin:  Warm, dry to touch.  No rash.  Ext:  No pitting edema LE b/l.           Medications:      All current medications were reviewed with changes reflected in problem list.         Data:      All new lab and imaging data was reviewed.   Labs:    Recent Labs  Lab 05/26/17  0455   *   POTASSIUM 3.4   CHLORIDE 125*   CO2 19*   ANIONGAP 9   *   BUN 47*   CR 1.25   GFRESTIMATED 55*   GFRESTBLACK 66   JENNA 10.0       Recent Labs  Lab 05/26/17 0455 05/25/17  0640   WBC 12.0* 16.9*   HGB  --  9.5*   HCT  --  30.7*   MCV  --  97   PLT  --  235      Imaging:   No results found for this or any previous visit (from the past 24 hour(s)).

## 2017-05-26 NOTE — PLAN OF CARE
Problem: Goal Outcome Summary  Goal: Goal Outcome Summary  Outcome: No Change  Pt has significant dementia.  Fecal incontinence x 2. PCA was d/c about 0300.  Alarms on, pt remained calm. Alert to self.  Has had junctional rhythm that was kelly at times.  Converted to possible afib w/ rvr.  Notified physician, received prn order for 2.5 mg IV metoprolol.

## 2017-05-26 NOTE — PROGRESS NOTES
"[facility]    Cardiology Progress Note    ASSESSMENT:  85-year-old male seen for junctional rhythm.  It is unclear how long he was in this rhythm.  He was on Toprol 200 mg twice daily, this was discontinued on admission May 22.      Overnight he converted to what appears to be afib with RVR.  HR decreased with metoprolol IV x1.      RECOMMENDATIONS:  1.  Junctional rhythm  - Toprol discontinued  - No immediate indication for pacemaker at this time, especially in the context of infection and sepsis    2. Atrial fibrillation  - cont metoprolol IV PRN for rate control, goal HR < ~90  - would be hesitant at this point to start PO scheduled rate controlling meds with junctional rhythm as above  - combination of junctional/rapid afib presents a \"tachy-kelly\" scenario; sometimes pacemaker with rate controlling meds is the optimal treatment; however with his moderate dementia, would not rush into this yet    Albaro Moser MD  Cardiology - [facility]  Pager:  [phone]  Text Page    SUBJECTIVE: he seemed to be uncomfortable and in some pain overnight, he could not verbalize specific complaints.  Slept poorly.     MEDICATIONS:  Current Facility-Administered Medications   Medication     metoprolol (LOPRESSOR) injection 2.5 mg     HYDROmorphone (DILAUDID) injection 0.2 mg     QUEtiapine (SEROquel) half-tab 12.5-25 mg     amLODIPine (NORVASC) tablet 10 mg     calcium carbonate (TUMS) chewable tablet 1,000 mg     pantoprazole (PROTONIX) EC tablet 40 mg     bisacodyl (DULCOLAX) Suppository 10 mg     polyethylene glycol (MIRALAX/GLYCOLAX) Packet 17 g     0.9% sodium chloride infusion     hydrALAZINE (APRESOLINE) tablet 10 mg     cefTRIAXone (ROCEPHIN) 1 g vial to attach to  mL bag for ADULTS or NS 50 mL bag for PEDS     naloxone (NARCAN) injection 0.1-0.4 mg     senna-docusate (SENOKOT-S;PERICOLACE) 8.6-50 MG per tablet 1-2 tablet     ondansetron (ZOFRAN-ODT) ODT tab 4 mg    Or     ondansetron (ZOFRAN) " injection 4 mg     acetaminophen (TYLENOL) tablet 1,000 mg     LORazepam (ATIVAN) 2 MG/ML (HIGH CONC) solution 0.5 mg     oxyCODONE (ROXICODONE) IR tablet 5 mg     polyethylene glycol (MIRALAX/GLYCOLAX) Packet 17 g       ALLERGIES:  Allergies   Allergen Reactions     Lisinopril      Penicillins        REVIEW OF SYSTEMS:  General: no fatigue, weight loss  Cardiac: per HPI  Respiratory: per HPI  GI: no nausea, emesis, melena  Musculoskeletal: no weakness  Neurologic: no aphasia, paraesthesias  Neuropsychiatric: no depression    PHYSICAL EXAM:  Temp: 98  F (36.7  C) Temp src: Axillary BP: 151/90 Pulse: 138 Heart Rate: 137 Resp: 22 SpO2: 93 % O2 Device: None (Room air)    Vital Signs with Ranges  Temp:  [97.3  F (36.3  C)-98.1  F (36.7  C)] 98  F (36.7  C)  Pulse:  [138] 138  Heart Rate:  [] 137  Resp:  [20-24] 22  BP: (145-186)/(69-92) 151/90  SpO2:  [93 %-100 %] 93 %  168 lbs 14.4 oz    Constitutional: awake, alert, no distress  Eyes: PERRL, sclera nonicteric  ENT: trachea midline  Respiratory: clear  Cardiovascular: kelly, regular, 2/6 holosystolic murmur at USB  GI: nondistended, nontender, bowel sounds present  Lymph/Hematologic: no lymphadenopathy  Skin: dry, no rash  Musculoskeletal: good muscle tone, strength 5/5 in upper and lower extremities  Neurologic: no focal deficits  Neuropsychiatric: appropriate affact    Intake/Output Summary (Last 24 hours) at 05/25/17 0756  Last data filed at 05/24/17 2231   Gross per 24 hour   Intake             1098 ml   Output              700 ml   Net              398 ml     DATA:  Labs: Na 153, Cr 1.2, WBC 12  Lab Results   Component Value Date    TROPI 0.064 (H) 05/24/2017    TROPI 0.044 05/22/2017    TROPI 0.012 06/02/2011     Tele:  Yesterday was in junctional rhythm, rate 60-70, at ~4AM he went into what is probably rapid afib, rates up to 140    Echo: 5-24: EF 60%, mild RVE with low-normal function, mod-sev TR, RVSP 45mmHg, mild AI

## 2017-05-26 NOTE — PLAN OF CARE
Problem: Goal Outcome Summary  Goal: Goal Outcome Summary  Outcome: Therapy, progress toward functional goals as expected  Discharge Planner   Patient plan for D/C: LTC with ongoing ST for dysphagia  Current status: Recommend continue dysphagia diet 1 and thin liquids w/ 1:1 supervision. Pt should be fully upright and alert for all PO, take small single sips/bites, alternate consistencies, and pace self. Caregivers to encourage PO intake.   Barriers to return to prior living situation: Pts current cognitive status and inability to follow safe swallow strategies independently   Recommendations for D/C: Ongoing ST for dysphagia upon discharge  Rationale for D/C recommendations: Pt is not yet at baseline diet level        Entered by: Sheridan Perez 05/26/2017 2:01 PM

## 2017-05-26 NOTE — PROGRESS NOTES
[facility]  Palliative Care Progress Note  Text Page     Assessment & Plan   I was asked to see the patient for goals of care.     Recommendations:  1. Pain - tylenol 975mg q 8 hours. Decrease Oxycodone scheduled (from PTA) to 2.5 mg 4 times daily and hold for sedation .Hydromorphone 0.2mg IV q 3 hours prn pain not relieved with oxycodone. Reposition for comfort.  2. Agitation - Decrease Ativan to 0.25mg BID. Prn ativan 0.25mg q 8 hours prn. DC seroquel. Promote food and fluids. Appreciate nursing assistance to keep pt awake during daytime.     Goal of Care: DNR/DNI.  Per guardian amina Richardson for IV or oral medications to control pt's heart rate. NO ICU transfers, no pressors.   He  would like to talk with pt's children prior to making him comfort care. If pt is discharged back to [facility], he will follow up there and update pt's POLST to comfort care. Filemon requests [facility] to assess pt for Hospice and would like him enrolled if he meets criteria. If pt can not be assessed for Hospice prior to discharge, Filemon would like it arranged for them to have this done at [facility] after discharge. Emergency number for guardian if issues arise is [phone] Raulito Flores this weekend.     Disease Process/es & Symptoms:  Edil Lopez is a 85 year old patient admitted for rutledge catheter replacement requiring urology consult to replace in ED. Pt was also noted with symptoms of bradycardia. He has been treated for junctional rhythm, dysphagia, UTI, urinary retention, ARF with CKD and HTN.   Pt has a hx of SP catheter for BPH and urinary retention, but had been converted to rutledge catheter. His catheter had fallen out approximately 2 weeks ago and was successfully replaced by NH staff, and was examined by Zelda BARRIGA from [facility] in [place].      This is in the setting of dementia, hx of CVA, s/p carotid endarterectomy, HTN, CKD, GERD, PVD, Hypothyroidism, Hyperparathyroidism.  Guardian reports that pt has had a 9# weight loss over 6 month period. He had been assessed for HOSPICE appropriateness at NH, however did not meet criteria. Guardian believes pt is comfort care at [facility] per POLST.       He has been hospitalized in the ED for rutledge catheter or SP catheter problems and UTI  2 times in the past 9 months. In Pt's clinical record, his weight is increased 20# since 5/2016.     The following symptoms are noted as concerning to pt quality of life - per nursing staff, providers and guardian.  Pain in low abdomin  Agitation     Psychosocial/Spiritual Needs:     Oriented guardian to Spiritual Health and Social Work Services as part of Palliative Care team.  is following.     Decision-Making & Goals of Care:  Discussion/counseling today about goals of care/decisions:   5/26/2017 Met with pt's guardian Filemon Calvert and Dr. Man.  Reviewed pt's current status with heart rhythm issues, UTI with underlying dementia. Risks and benefits of various medical and procedural interventions in pt with dementia explained. Guardian shares that pt's son, dtr and niece are not involved with pt, do not visit. Pt does not interact when taken to special events at [facility]. Pt is always in bed, often sleeping, incont of bowel. No pictures on the wall, no apparent belgica or pleasures. Pt does speak. Is total care for feeding, bathing and other ADLS. He does not walk. He does not sit up independently. Filemon is comfortable with a hospice, palliative approach but would like to talk with pt's children before making decision for comfort care as he knows pt will stop his heart medication. Filemon requests for SWS to assist with [facility] assessment for Hospice support for pt at discharge back to [facility]. Filemon states not to take pt to ICU, or offer pressors. IV or oral medications to help with heart rhythm are ok. Filemon understands that pt will be discharged back to [facility] when MDs feel  that his UTI is improving and he is stable with his heart rhythm and blood pressure. POLST from 10/10/2014 is still accurate and [facility] has a copy of this.  5/25/17 Per [facility], pt has a guardian. [facility] has been attempting to obtain contact information and documents for much of the day.  Was contacted by natalie Calvert at 3:30 pm today. Per guardian, Pt and his wife were both residents of [facility]. Wife passed away 6 months ago in Hospice. [facility] at [facility] had recommended hospice 9-10 months ago for pt, however, he did not meet criteria at that time.  Guardian states pt's baseline currently is that he is verbal, but forgetful and no short term memory. Pt is in bed when guardian sees him although [facility] staff state he gets up to a WC. Pt is incontinent of bowel. Has indwelling catheter. Requires help for cares and is fed. Natalie believes that pt is comfort care per his most recent POLST. Have requested a copy. Guardian works M-F, and only is available until 1pm on Friday. Have requested a care conference with Natalie, MARY, Dr. Man for 11:30am to review most recent POLST, pt condition, and establish POC. If pt now meets HOSPICE criteria, guardian reports that [facility] is the provider that would be selected.     Patient has decision-making capacity Unreliable  Patient has a court-appointed guardian/conservator for healthcare decisions in a legal document . See name(s) and contact information in Health Care Agent/Legal Guardian section below.     Name: Filemon Calvert, Relationship: guardian, Phone(s): [phone]. Emergency number for weekends and holidays [phone].  Appeciate [facility] help obtaining legal guardian documents.        Patient has a completed health care directive available in the chart (Y/N): N  Physician orders for life-sustaining treatment (POLST) form is on file dated 10/10/14.  Have confirmed that this is the most updated POLST with natalie Colbert.  Code Status:                      Do not resusitate / Do not intubate     Oxana BARRIGA, CNS  Pain Management and Palliative Care  [facility]  Pgr: [phone]    Time Spent on this Encounter   I spent 60 minutes in assessment of the patient and discussion with the patient and family. Another 30 minutes in review of chart, documentation and discussion with the health care team.  Care conference with pt's guardinoelle Colbert 12:00-12:45pm    Interval History   Pt is sedated, difficult for him to open his eyes. Nursing reports that he woke up enough to eat breakfast this am. R hand puffy, +1 LE edema. Ortiz intact and draining ada urine. Guardian Filemon is present for care conference.    Review of Systems  unable to assess.  Nursing reports periods of restlessness and agitation with ADLS.      Palliative Symptom Review (0=no symptom/no concern, 1=mild, 2=moderate, 3=severe):    Unable to assess pt.    Pain - Pt with known abdominal pain. Does not appear to be in pain at present per nursing.   Agitation     Physical Exam   Temp:  [97.2  F (36.2  C)-98.1  F (36.7  C)] 97.2  F (36.2  C)  Pulse:  [] 92  Heart Rate:  [] 137  Resp:  [20-24] 22  BP: (145-186)/(69-94) 172/94  SpO2:  [93 %-100 %] 93 %  168 lbs 14.4 oz  GEN:  Sedated. Opens eyes with difficulty and wrinkles his nose to voice.   HEENT:  Normocephalic/atraumatic, no scleral icterus, no nasal discharge, mouth moist.  CV:  RRR, S1, S2; no murmurs or other irregularities noted.  +3 DP/PT pulses bilatererally; 2+ edema BLE.  RESP:  Diminished bilat anteriorly.  Symmetric chest rise on inhalation noted.  Normal respiratory effort.  ABD:  Rounded, soft, non-tender/non-distended.  +BS  EXT:  Edema & pulses as noted above.  Pale.  M/S:   Deferred.  SKIN:  Dry to touch, no exanthems noted in the visualized areas.    NEURO: Symmetric strength +5/5. Withdraws feet to noxious stimuli.  Unable to assess.  Psych:  Sedated.    Medications        amLODIPine  10 mg Oral Daily      pantoprazole  40 mg Oral QAM     hydrALAZINE  10 mg Oral Q8H LOLY     cefTRIAXone  1 g Intravenous Q24H     senna-docusate  1-2 tablet Oral BID     acetaminophen (TYLENOL) tablet 1,000 mg  1,000 mg Oral TID     LORazepam  0.5 mg Oral BID     oxyCODONE (ROXICODONE) IR tablet 5 mg  5 mg Oral 4x Daily     polyethylene glycol  17 g Oral Daily       Data   Results for orders placed or performed during the hospital encounter of 05/22/17 (from the past 24 hour(s))   EKG 12-lead, complete   Result Value Ref Range    Interpretation ECG Click View Image link to view waveform and result    EKG 12-lead, complete   Result Value Ref Range    Interpretation ECG Click View Image link to view waveform and result    Basic metabolic panel   Result Value Ref Range    Sodium 153 (H) 133 - 144 mmol/L    Potassium 3.4 3.4 - 5.3 mmol/L    Chloride 125 (H) 94 - 109 mmol/L    Carbon Dioxide 19 (L) 20 - 32 mmol/L    Anion Gap 9 3 - 14 mmol/L    Glucose 112 (H) 70 - 99 mg/dL    Urea Nitrogen 47 (H) 7 - 30 mg/dL    Creatinine 1.25 0.66 - 1.25 mg/dL    GFR Estimate 55 (L) >60 mL/min/1.7m2    GFR Estimate If Black 66 >60 mL/min/1.7m2    Calcium 10.0 8.5 - 10.1 mg/dL   WBC count   Result Value Ref Range    WBC 12.0 (H) 4.0 - 11.0 10e9/L   Lactic acid level STAT   Result Value Ref Range    Lactic Acid 1.0 0.7 - 2.1 mmol/L   EKG 12-lead, tracing only   Result Value Ref Range    Interpretation ECG Click View Image link to view waveform and result

## 2017-05-26 NOTE — PROGRESS NOTES
I was called for HR up to 130's with concern that he had gone into AFIB.  12 lead ECG was obtained and showed what looks like an accelerated junctional rhythm to me.  Computer reads ECG as sinus tachycardia.  Review of chart shows that he had been in junctional rhythm with rate of 40's until about 36 hours ago.  He was on Metoprolol 200 mg BID prior to admission.  I will order IV Metoprolol for use as needed- 2.5 mg IV q 4 hours prn HR > 100. Continue to monitor.

## 2017-05-26 NOTE — PLAN OF CARE
Problem: Arrhythmia/Dysrhythmia (Symptomatic) (Adult)  Goal: Signs and Symptoms of Listed Potential Problems Will be Absent or Manageable (Arrhythmia/Dysrhythmia)  Signs and symptoms of listed potential problems will be absent or manageable by discharge/transition of care (reference Arrhythmia/Dysrhythmia (Symptomatic) (Adult) CPG).   Pt alert to self, prn Seroquel for agitation,Ortiz patent, tolerating DD1 pureed with thin liquid diet, up with A-2 with lift, has scheduled Ativan, Tele Accelerated JR/Afib/Aflutter, HR 70's, EKG done Accelerated Junctional Rhythm, text paged MD, will continue with POC .

## 2017-05-26 NOTE — PLAN OF CARE
Problem: Goal Outcome Summary  Goal: Goal Outcome Summary  SLP: Attempted to see pt for dysphagia tx; however per chart review and discussion with nursing pt agitated and awake most of the night, pt finally sleeping and did not wake. Will re-attempt later as able, otherwise will follow up tomorrow.

## 2017-05-27 LAB
ANION GAP SERPL CALCULATED.3IONS-SCNC: 9 MMOL/L (ref 3–14)
BUN SERPL-MCNC: 28 MG/DL (ref 7–30)
CALCIUM SERPL-MCNC: 9.8 MG/DL (ref 8.5–10.1)
CHLORIDE SERPL-SCNC: 123 MMOL/L (ref 94–109)
CO2 SERPL-SCNC: 18 MMOL/L (ref 20–32)
CREAT SERPL-MCNC: 0.99 MG/DL (ref 0.66–1.25)
GFR SERPL CREATININE-BSD FRML MDRD: 72 ML/MIN/1.7M2
GLUCOSE SERPL-MCNC: 92 MG/DL (ref 70–99)
LACTATE BLD-SCNC: 2 MMOL/L (ref 0.7–2.1)
POTASSIUM SERPL-SCNC: 3.1 MMOL/L (ref 3.4–5.3)
POTASSIUM SERPL-SCNC: 3.7 MMOL/L (ref 3.4–5.3)
SODIUM SERPL-SCNC: 150 MMOL/L (ref 133–144)

## 2017-05-27 PROCEDURE — 25000128 H RX IP 250 OP 636: Performed by: CLINICAL NURSE SPECIALIST

## 2017-05-27 PROCEDURE — 99233 SBSQ HOSP IP/OBS HIGH 50: CPT | Performed by: INTERNAL MEDICINE

## 2017-05-27 PROCEDURE — 83605 ASSAY OF LACTIC ACID: CPT | Performed by: INTERNAL MEDICINE

## 2017-05-27 PROCEDURE — 36415 COLL VENOUS BLD VENIPUNCTURE: CPT | Performed by: INTERNAL MEDICINE

## 2017-05-27 PROCEDURE — 25000128 H RX IP 250 OP 636: Performed by: INTERNAL MEDICINE

## 2017-05-27 PROCEDURE — 25000132 ZZH RX MED GY IP 250 OP 250 PS 637: Performed by: INTERNAL MEDICINE

## 2017-05-27 PROCEDURE — 84132 ASSAY OF SERUM POTASSIUM: CPT | Performed by: INTERNAL MEDICINE

## 2017-05-27 PROCEDURE — 93010 ELECTROCARDIOGRAM REPORT: CPT | Performed by: INTERNAL MEDICINE

## 2017-05-27 PROCEDURE — 25000132 ZZH RX MED GY IP 250 OP 250 PS 637: Performed by: CLINICAL NURSE SPECIALIST

## 2017-05-27 PROCEDURE — 80048 BASIC METABOLIC PNL TOTAL CA: CPT | Performed by: INTERNAL MEDICINE

## 2017-05-27 PROCEDURE — 25000125 ZZHC RX 250: Performed by: INTERNAL MEDICINE

## 2017-05-27 PROCEDURE — 12000007 ZZH R&B INTERMEDIATE

## 2017-05-27 PROCEDURE — 40000275 ZZH STATISTIC RCP TIME EA 10 MIN

## 2017-05-27 PROCEDURE — 25000132 ZZH RX MED GY IP 250 OP 250 PS 637: Performed by: PHYSICIAN ASSISTANT

## 2017-05-27 PROCEDURE — 93005 ELECTROCARDIOGRAM TRACING: CPT

## 2017-05-27 RX ORDER — POTASSIUM CHLORIDE 29.8 MG/ML
20 INJECTION INTRAVENOUS
Status: DISCONTINUED | OUTPATIENT
Start: 2017-05-27 | End: 2017-06-01 | Stop reason: HOSPADM

## 2017-05-27 RX ORDER — POTASSIUM CHLORIDE 1500 MG/1
20-40 TABLET, EXTENDED RELEASE ORAL
Status: DISCONTINUED | OUTPATIENT
Start: 2017-05-27 | End: 2017-06-01 | Stop reason: HOSPADM

## 2017-05-27 RX ORDER — ISOSORBIDE DINITRATE 10 MG/1
10 TABLET ORAL 3 TIMES DAILY
Status: DISCONTINUED | OUTPATIENT
Start: 2017-05-27 | End: 2017-06-01 | Stop reason: HOSPADM

## 2017-05-27 RX ORDER — HYDRALAZINE HYDROCHLORIDE 25 MG/1
25 TABLET, FILM COATED ORAL EVERY 8 HOURS SCHEDULED
Status: DISCONTINUED | OUTPATIENT
Start: 2017-05-27 | End: 2017-06-01 | Stop reason: HOSPADM

## 2017-05-27 RX ORDER — POTASSIUM CL/LIDO/0.9 % NACL 10MEQ/0.1L
10 INTRAVENOUS SOLUTION, PIGGYBACK (ML) INTRAVENOUS
Status: DISCONTINUED | OUTPATIENT
Start: 2017-05-27 | End: 2017-06-01 | Stop reason: HOSPADM

## 2017-05-27 RX ORDER — POTASSIUM CHLORIDE 1.5 G/1.58G
20-40 POWDER, FOR SOLUTION ORAL
Status: DISCONTINUED | OUTPATIENT
Start: 2017-05-27 | End: 2017-06-01 | Stop reason: HOSPADM

## 2017-05-27 RX ORDER — POTASSIUM CHLORIDE 7.45 MG/ML
10 INJECTION INTRAVENOUS
Status: DISCONTINUED | OUTPATIENT
Start: 2017-05-27 | End: 2017-06-01 | Stop reason: HOSPADM

## 2017-05-27 RX ADMIN — AMIODARONE HYDROCHLORIDE 1 MG/MIN: 50 INJECTION, SOLUTION INTRAVENOUS at 11:22

## 2017-05-27 RX ADMIN — AMLODIPINE BESYLATE 10 MG: 10 TABLET ORAL at 08:27

## 2017-05-27 RX ADMIN — POLYETHYLENE GLYCOL 3350 17 G: 17 POWDER, FOR SOLUTION ORAL at 08:28

## 2017-05-27 RX ADMIN — CALCIUM CARBONATE (ANTACID) CHEW TAB 500 MG 1000 MG: 500 CHEW TAB at 17:27

## 2017-05-27 RX ADMIN — SENNOSIDES AND DOCUSATE SODIUM 1 TABLET: 8.6; 5 TABLET ORAL at 08:27

## 2017-05-27 RX ADMIN — ACETAMINOPHEN 1000 MG: 500 TABLET, FILM COATED ORAL at 08:27

## 2017-05-27 RX ADMIN — OXYCODONE HYDROCHLORIDE 2.5 MG: 5 TABLET ORAL at 08:27

## 2017-05-27 RX ADMIN — Medication 0.2 MG: at 06:28

## 2017-05-27 RX ADMIN — POTASSIUM CHLORIDE 40 MEQ: 1500 TABLET, EXTENDED RELEASE ORAL at 09:24

## 2017-05-27 RX ADMIN — POTASSIUM CHLORIDE 20 MEQ: 1500 TABLET, EXTENDED RELEASE ORAL at 11:20

## 2017-05-27 RX ADMIN — LORAZEPAM 0.26 MG: 2 CONCENTRATE ORAL at 08:49

## 2017-05-27 RX ADMIN — CEFTRIAXONE SODIUM 1 G: 1 INJECTION, POWDER, FOR SOLUTION INTRAMUSCULAR; INTRAVENOUS at 23:05

## 2017-05-27 RX ADMIN — SENNOSIDES AND DOCUSATE SODIUM 1 TABLET: 8.6; 5 TABLET ORAL at 19:23

## 2017-05-27 RX ADMIN — AMIODARONE HYDROCHLORIDE 0.5 MG/MIN: 50 INJECTION, SOLUTION INTRAVENOUS at 16:17

## 2017-05-27 RX ADMIN — AMIODARONE HYDROCHLORIDE 0.5 MG/MIN: 50 INJECTION, SOLUTION INTRAVENOUS at 23:26

## 2017-05-27 RX ADMIN — HYDRALAZINE HYDROCHLORIDE 10 MG: 10 TABLET, FILM COATED ORAL at 06:13

## 2017-05-27 RX ADMIN — LORAZEPAM 0.5 MG: 2 CONCENTRATE ORAL at 19:25

## 2017-05-27 RX ADMIN — ISOSORBIDE DINITRATE 10 MG: 10 TABLET ORAL at 17:19

## 2017-05-27 RX ADMIN — PANTOPRAZOLE SODIUM 40 MG: 40 TABLET, DELAYED RELEASE ORAL at 08:27

## 2017-05-27 RX ADMIN — HYDRALAZINE HYDROCHLORIDE 25 MG: 25 TABLET ORAL at 22:05

## 2017-05-27 RX ADMIN — ISOSORBIDE DINITRATE 10 MG: 10 TABLET ORAL at 22:06

## 2017-05-27 RX ADMIN — OXYCODONE HYDROCHLORIDE 2.5 MG: 5 TABLET ORAL at 19:23

## 2017-05-27 RX ADMIN — ISOSORBIDE DINITRATE 10 MG: 10 TABLET ORAL at 10:05

## 2017-05-27 RX ADMIN — LORAZEPAM 0.26 MG: 2 CONCENTRATE ORAL at 01:33

## 2017-05-27 RX ADMIN — OXYCODONE HYDROCHLORIDE 2.5 MG: 5 TABLET ORAL at 12:02

## 2017-05-27 RX ADMIN — ACETAMINOPHEN 1000 MG: 500 TABLET, FILM COATED ORAL at 19:23

## 2017-05-27 RX ADMIN — ACETAMINOPHEN 1000 MG: 500 TABLET, FILM COATED ORAL at 14:12

## 2017-05-27 RX ADMIN — AMIODARONE HYDROCHLORIDE 0.5 MG/MIN: 50 INJECTION, SOLUTION INTRAVENOUS at 14:31

## 2017-05-27 NOTE — PLAN OF CARE
Problem: Goal Outcome Summary  Goal: Goal Outcome Summary  Outcome: No Change  Total care.  Tele: Afib w/cvr with brief jumps into 110-120s.  Asked for a bedpan last night and had a BM.  Angel in place for retention.  Liq ativan PO given x 1 d/t elevated BP and agitation.  Too early for scheduled hydralazine and HR not high enough for metoprolol.  Palliative care following.  See notes.

## 2017-05-27 NOTE — PROVIDER NOTIFICATION
Tele tech called this writer to inform me of hr 140-150's. Vss, hr 150's. Pt denies chest pain. Oral k given for low level. ekg ordered. And Dr Man paged with update.   Attempted to have pt bear down to slow hr down. Pt unable to follow directions.

## 2017-05-27 NOTE — PLAN OF CARE
Problem: Goal Outcome Summary  Goal: Goal Outcome Summary  Outcome: No Change  Patient needs total care, turn Q 2hour. F/C intact with cares given.Alert to name.  Skin intact. Denies pain but routine pain meds given. Poor appetite, ate 15%, drank small amt of cold H2O. Tele accelerated junction rhythm. +UTI IV Rocephin.Palliative nurse following pt, DNR/DNI-no transfer to ICU.

## 2017-05-27 NOTE — PLAN OF CARE
Problem: Goal Outcome Summary  Goal: Goal Outcome Summary  Outcome: No Change  Orientation: Sleepy but will respond to his name. More alert this morning.  Vs: bp elevated this morning. Am meds given. Isosorbide ordered and given. Pt noted to have fast hr at approx 0930. ekg obtained. MD leigh and cardiologist made aware. Orders for amio iv received. No bolus given. Started amiol gtt at 60mg/hr. Hr noticed to be slower. ekg obtained at 1400 which showed junctional. amio gtt decreased to 0.5mg/min. Hydralazine 1400 dose held d/t soft bp.  02: ra 94-96%  Tele:junctional, afib rvr, then junctional.  LS:clear  GI:bs+.   : rutledge with 380cc output  Skin:intact, dry  Activity: turned q2hrs  Pain: c/o all over when asked at times. Pt on scheduled tylenol and oxycodone  Plan: amio gtt. k 3.1, replaced with 1400 recheck. Dd level 1 diet with thin liq. Few coughs this morning none at lunch meal time.

## 2017-05-27 NOTE — PROGRESS NOTES
[facility]    Cardiology Progress Note    SUBJECTIVE: The patient denies any cardiac complaints such as CP or SOB.  He does not communicate much due to his dementia and delerium.    MEDICATIONS:  Current Facility-Administered Medications   Medication     potassium chloride SA (K-DUR/KLOR-CON M) CR tablet 20-40 mEq     potassium chloride (KLOR-CON) Packet 20-40 mEq     potassium chloride 10 mEq in 100 mL intermittent infusion     potassium chloride 10 mEq in 100 mL intermittent infusion with 10 mg lidocaine     potassium chloride 20 mEq in 50 mL intermittent infusion     hydrALAZINE (APRESOLINE) tablet 25 mg     isosorbide dinitrate (ISORDIL) tablet 10 mg     amiodarone (NEXTERONE) 250 mg in D5W 250 mL infusion     amiodarone (NEXTERONE) 250 mg in D5W 250 mL infusion     oxyCODONE (ROXICODONE) IR half-tab 2.5 mg     LORazepam (ATIVAN) 2 MG/ML (HIGH CONC) solution 0.26-0.5 mg     LORazepam (ATIVAN) 2 MG/ML (HIGH CONC) solution 0.26 mg     sodium chloride (PF) 0.9% PF flush 3 mL     sodium chloride (PF) 0.9% PF flush 3 mL     HYDROmorphone (DILAUDID) injection 0.2 mg     amLODIPine (NORVASC) tablet 10 mg     calcium carbonate (TUMS) chewable tablet 1,000 mg     pantoprazole (PROTONIX) EC tablet 40 mg     bisacodyl (DULCOLAX) Suppository 10 mg     polyethylene glycol (MIRALAX/GLYCOLAX) Packet 17 g     cefTRIAXone (ROCEPHIN) 1 g vial to attach to  mL bag for ADULTS or NS 50 mL bag for PEDS     naloxone (NARCAN) injection 0.1-0.4 mg     senna-docusate (SENOKOT-S;PERICOLACE) 8.6-50 MG per tablet 1-2 tablet     acetaminophen (TYLENOL) tablet 1,000 mg     polyethylene glycol (MIRALAX/GLYCOLAX) Packet 17 g       ALLERGIES:  Allergies   Allergen Reactions     Lisinopril      Penicillins        PHYSICAL EXAM:  Temp: 98.3  F (36.8  C) Temp src: Oral BP: (!) 149/105 Pulse: 153 Heart Rate: 71 Resp: 20 SpO2: 94 % O2 Device: None (Room air)    Vital Signs with Ranges  Temp:  [95.6  F (35.3  C)-98.3  F (36.8  C)]  98.3  F (36.8  C)  Pulse:  [153] 153  Heart Rate:  [71-97] 71  Resp:  [16-20] 20  BP: (149-198)/() 149/105  SpO2:  [94 %-96 %] 94 %  168 lbs 9.6 oz    Constitutional: awake, alert, no distress  Eyes: sclera nonicteric  ENT: trachea midline  Respiratory: clear  Cardiovascular: Tachycardic  GI: nondistended, nontender, bowel sounds present  Lymph/Hematologic: no lymphadenopathy  Skin: dry, no rash  Musculoskeletal: good muscle tone  Neurologic: no focal deficits  Neuropsychiatric: blunted affect      Tele:  Af/FLUTTER with HR's around 150 bpm.    Echo: 5-24: EF 60%, mild RVE with low-normal function, mod-sev TR, RVSP 45mmHg, mild AI      ASSESSMENT:  85-year-old male seen for junctional rhythm and AF with RVR consistent with tachy/kelly syndrome.  Now in AF with RVR.  I reviewed his tele and there was no significant pauses since admission.        RECOMMENDATIONS:  1.  AF with RVR in the setting of tachy/kelly syndrome -  I have started an amiodarone gtt at this time as the patient would clearly benefit from being in SR given his other comorbid conditions.  Even when he was in a junctional rhythm I do not see evidence that he requires a PPM so the addition of amiodarone should be safe at this time.  I would favor not starting AC on this patient given his comorbid conditions and significant dementia.  He is not a good long term AC candidate.    Aniceto Hagen MD

## 2017-05-27 NOTE — PROVIDER NOTIFICATION
DR Hagen paged. Pt's appears to be in sr. EKG showed junctional. DR Hagen wanted amio gtt to be decreased to 0.5mg/min and leave at this rate until pt is seen my him in the morning.

## 2017-05-27 NOTE — PROGRESS NOTES
[facility]  Hospitalist Progress Note  Hardeep Man, DO 05/27/2017    Reason for Stay (Diagnosis): bradycardia         Assessment and Plan:      Summary of Stay: Edil Lopez is a 85 year old male admitted on 5/22/2017 with need for rutledge replacement by urology, DWAIN and junctional bradycardia in the 30s.  Found to have E coli UTI.  Hx of dementia, HTN, CKD.        Problem List:       1. Tachy/Jw syndrome.  Cardiology input appreciated.  Start on Amiodarone drip today.  Continue telemetry monitoring.  2. UTI.  On ceftriaxone since 5/24.  Urine culture with two strains of E coli both of which are resistant to levofloxacin.  Will plan for 7 days of antibiotics in total, with continued IV while inpatient.  3. Urine retention.  Chronic.  Appreciate Urology input.  Rutledge cath in place.    4. Dysphagia.  Speech path consulting but patient had difficulty cooperating due to dementia.  Continue pureed diet for now.  5. Acute on chronic kidney failure.  Likely due to obstruction.   Creatinine 0.99 today and back to baseline.  Avoid nephrotoxins as much as possible.  6. Hypernatremia.  Improved to 150 today.  Encourage free water intake.  Continue to monitor.   7. Hypokalemia.  Start Potassium replacement protocol.  8. GERD.  Protonix.  TUMS PRN.  9. HTN.  Norvasc 10 mg/day.  Increase Hydralazine to 25 mg TID.  Add Isordil 10 mg TID.  10. Chronic pain syndrome.  Pain team following.    11. Dementia.  Chronic.          DVT Prophylaxis: Pneumatic Compression Devices  Code Status: DNR / DNI  Discharge Dispo: LTC in 2-3 days        Interval History (Subjective):      Having some shoulder pain on right.  No CP, SOB, F/C, N/V, or diarrhea.                  Physical Exam:      Last Vital Signs:  /45 (BP Location: Right arm)  Pulse 100  Temp 98.3  F (36.8  C) (Oral)  Resp 22  Wt 76.5 kg (168 lb 9.6 oz)  SpO2 94%  BMI 27.21 kg/m2    Gen:  NAD, A&Ox1 to self.  Not oriented to place or time.  Eyes:   PERRL, sclera anicteric.  OP:  MMM, no lesions.  Neck:  Supple.  CV:  Regular, mildly tachycardic, no murmurs.  Lung:  CTA b/l, normal effort.  Ab:  +BS, soft.  Skin:  Warm, dry to touch.  No rash.  Ext:  Mild non pitting edema LE b/l.           Medications:      All current medications were reviewed with changes reflected in problem list.         Data:      All new lab and imaging data was reviewed.   Labs:    Recent Labs  Lab 05/27/17  0656   *   POTASSIUM 3.1*   CHLORIDE 123*   CO2 18*   ANIONGAP 9   GLC 92   BUN 28   CR 0.99   GFRESTIMATED 72   GFRESTBLACK 87   JENNA 9.8       Recent Labs  Lab 05/26/17  0455 05/25/17  0640   WBC 12.0* 16.9*   HGB  --  9.5*   HCT  --  30.7*   MCV  --  97   PLT  --  235      Imaging:   No results found for this or any previous visit (from the past 24 hour(s)).

## 2017-05-27 NOTE — PLAN OF CARE
Problem: Goal Outcome Summary  Goal: Goal Outcome Summary  SLP Note: Attempted to see pt for dysphagia tx. RN reports that patient is resting after elevated HR this morning. Per nursing, pt had coughing with liquids during breakfast but not with lunch. Pt was sleeping soundly upon SLP arrival and did not rouse. Will follow-up as able/appropriate.

## 2017-05-27 NOTE — PROGRESS NOTES
SP[facility]UAL [facility]  SPIRITUAL ASSESSMENT Progress Note  [facility] [address]    Visited this pt at the request of the palliative team.  During our visit, Don's eyes were closed and his body was mildly agitated.  Per the suggestion of his guardian, prayed over him.      Will notify the unit  of our visit.    Juanpablo Espinosa M.Div.  Staff   Pager [phone]

## 2017-05-28 LAB
ANION GAP SERPL CALCULATED.3IONS-SCNC: 8 MMOL/L (ref 3–14)
BUN SERPL-MCNC: 19 MG/DL (ref 7–30)
CALCIUM SERPL-MCNC: 9.5 MG/DL (ref 8.5–10.1)
CHLORIDE SERPL-SCNC: 118 MMOL/L (ref 94–109)
CO2 SERPL-SCNC: 20 MMOL/L (ref 20–32)
CREAT SERPL-MCNC: 0.88 MG/DL (ref 0.66–1.25)
GFR SERPL CREATININE-BSD FRML MDRD: 82 ML/MIN/1.7M2
GLUCOSE SERPL-MCNC: 117 MG/DL (ref 70–99)
INTERPRETATION ECG - MUSE: NORMAL
POTASSIUM SERPL-SCNC: 3.5 MMOL/L (ref 3.4–5.3)
SODIUM SERPL-SCNC: 146 MMOL/L (ref 133–144)

## 2017-05-28 PROCEDURE — 25000132 ZZH RX MED GY IP 250 OP 250 PS 637: Performed by: INTERNAL MEDICINE

## 2017-05-28 PROCEDURE — 99231 SBSQ HOSP IP/OBS SF/LOW 25: CPT | Performed by: INTERNAL MEDICINE

## 2017-05-28 PROCEDURE — 25000125 ZZHC RX 250: Performed by: INTERNAL MEDICINE

## 2017-05-28 PROCEDURE — 25000128 H RX IP 250 OP 636: Performed by: INTERNAL MEDICINE

## 2017-05-28 PROCEDURE — 99232 SBSQ HOSP IP/OBS MODERATE 35: CPT | Performed by: INTERNAL MEDICINE

## 2017-05-28 PROCEDURE — 25000132 ZZH RX MED GY IP 250 OP 250 PS 637: Performed by: PHYSICIAN ASSISTANT

## 2017-05-28 PROCEDURE — 12000007 ZZH R&B INTERMEDIATE

## 2017-05-28 PROCEDURE — 25000132 ZZH RX MED GY IP 250 OP 250 PS 637: Performed by: CLINICAL NURSE SPECIALIST

## 2017-05-28 PROCEDURE — 80048 BASIC METABOLIC PNL TOTAL CA: CPT | Performed by: INTERNAL MEDICINE

## 2017-05-28 PROCEDURE — 36415 COLL VENOUS BLD VENIPUNCTURE: CPT | Performed by: INTERNAL MEDICINE

## 2017-05-28 PROCEDURE — 25000128 H RX IP 250 OP 636: Performed by: CLINICAL NURSE SPECIALIST

## 2017-05-28 RX ORDER — OXYCODONE HYDROCHLORIDE 5 MG/1
5 TABLET ORAL ONCE
Status: COMPLETED | OUTPATIENT
Start: 2017-05-28 | End: 2017-05-28

## 2017-05-28 RX ADMIN — ISOSORBIDE DINITRATE 10 MG: 10 TABLET ORAL at 22:01

## 2017-05-28 RX ADMIN — AMIODARONE HYDROCHLORIDE 0.5 MG/MIN: 50 INJECTION, SOLUTION INTRAVENOUS at 18:06

## 2017-05-28 RX ADMIN — HYDRALAZINE HYDROCHLORIDE 25 MG: 25 TABLET ORAL at 05:19

## 2017-05-28 RX ADMIN — POLYETHYLENE GLYCOL 3350 17 G: 17 POWDER, FOR SOLUTION ORAL at 08:40

## 2017-05-28 RX ADMIN — Medication 0.2 MG: at 02:46

## 2017-05-28 RX ADMIN — HYDRALAZINE HYDROCHLORIDE 25 MG: 25 TABLET ORAL at 14:16

## 2017-05-28 RX ADMIN — ISOSORBIDE DINITRATE 10 MG: 10 TABLET ORAL at 16:34

## 2017-05-28 RX ADMIN — ACETAMINOPHEN 1000 MG: 500 TABLET, FILM COATED ORAL at 08:40

## 2017-05-28 RX ADMIN — ACETAMINOPHEN 1000 MG: 500 TABLET, FILM COATED ORAL at 14:07

## 2017-05-28 RX ADMIN — OXYCODONE HYDROCHLORIDE 5 MG: 5 TABLET ORAL at 05:19

## 2017-05-28 RX ADMIN — CEFTRIAXONE SODIUM 1 G: 1 INJECTION, POWDER, FOR SOLUTION INTRAMUSCULAR; INTRAVENOUS at 21:24

## 2017-05-28 RX ADMIN — OXYCODONE HYDROCHLORIDE 2.5 MG: 5 TABLET ORAL at 12:09

## 2017-05-28 RX ADMIN — LORAZEPAM 0.26 MG: 2 CONCENTRATE ORAL at 00:52

## 2017-05-28 RX ADMIN — OXYCODONE HYDROCHLORIDE 2.5 MG: 5 TABLET ORAL at 16:33

## 2017-05-28 RX ADMIN — OXYCODONE HYDROCHLORIDE 2.5 MG: 5 TABLET ORAL at 20:20

## 2017-05-28 RX ADMIN — ISOSORBIDE DINITRATE 10 MG: 10 TABLET ORAL at 08:40

## 2017-05-28 RX ADMIN — HYDRALAZINE HYDROCHLORIDE 25 MG: 25 TABLET ORAL at 22:01

## 2017-05-28 RX ADMIN — OXYCODONE HYDROCHLORIDE 2.5 MG: 5 TABLET ORAL at 08:40

## 2017-05-28 RX ADMIN — SENNOSIDES AND DOCUSATE SODIUM 1 TABLET: 8.6; 5 TABLET ORAL at 08:40

## 2017-05-28 RX ADMIN — PANTOPRAZOLE SODIUM 40 MG: 40 TABLET, DELAYED RELEASE ORAL at 08:41

## 2017-05-28 RX ADMIN — LORAZEPAM 0.5 MG: 2 CONCENTRATE ORAL at 19:31

## 2017-05-28 RX ADMIN — LORAZEPAM 0.5 MG: 2 CONCENTRATE ORAL at 08:41

## 2017-05-28 RX ADMIN — AMLODIPINE BESYLATE 10 MG: 10 TABLET ORAL at 08:41

## 2017-05-28 RX ADMIN — SENNOSIDES AND DOCUSATE SODIUM 1 TABLET: 8.6; 5 TABLET ORAL at 19:28

## 2017-05-28 RX ADMIN — ACETAMINOPHEN 1000 MG: 500 TABLET, FILM COATED ORAL at 19:28

## 2017-05-28 RX ADMIN — AMIODARONE HYDROCHLORIDE 0.5 MG/MIN: 50 INJECTION, SOLUTION INTRAVENOUS at 08:27

## 2017-05-28 NOTE — PROGRESS NOTES
[facility]    Cardiology Progress Note    SUBJECTIVE: The patient was sleeping this AM.  He appears to be in atrial flutter with an improved rate this AM on tele.    MEDICATIONS:  Current Facility-Administered Medications   Medication     potassium chloride SA (K-DUR/KLOR-CON M) CR tablet 20-40 mEq     potassium chloride (KLOR-CON) Packet 20-40 mEq     potassium chloride 10 mEq in 100 mL intermittent infusion     potassium chloride 10 mEq in 100 mL intermittent infusion with 10 mg lidocaine     potassium chloride 20 mEq in 50 mL intermittent infusion     hydrALAZINE (APRESOLINE) tablet 25 mg     isosorbide dinitrate (ISORDIL) tablet 10 mg     amiodarone (NEXTERONE) 250 mg in D5W 250 mL infusion     oxyCODONE (ROXICODONE) IR half-tab 2.5 mg     LORazepam (ATIVAN) 2 MG/ML (HIGH CONC) solution 0.26-0.5 mg     LORazepam (ATIVAN) 2 MG/ML (HIGH CONC) solution 0.26 mg     sodium chloride (PF) 0.9% PF flush 3 mL     sodium chloride (PF) 0.9% PF flush 3 mL     HYDROmorphone (DILAUDID) injection 0.2 mg     amLODIPine (NORVASC) tablet 10 mg     calcium carbonate (TUMS) chewable tablet 1,000 mg     pantoprazole (PROTONIX) EC tablet 40 mg     bisacodyl (DULCOLAX) Suppository 10 mg     polyethylene glycol (MIRALAX/GLYCOLAX) Packet 17 g     cefTRIAXone (ROCEPHIN) 1 g vial to attach to  mL bag for ADULTS or NS 50 mL bag for PEDS     naloxone (NARCAN) injection 0.1-0.4 mg     senna-docusate (SENOKOT-S;PERICOLACE) 8.6-50 MG per tablet 1-2 tablet     acetaminophen (TYLENOL) tablet 1,000 mg     polyethylene glycol (MIRALAX/GLYCOLAX) Packet 17 g       ALLERGIES:  Allergies   Allergen Reactions     Lisinopril      Penicillins        PHYSICAL EXAM:  Temp: 98.6  F (37  C) Temp src: Oral BP: 147/72 Pulse: 100 Heart Rate: 115 Resp: 22 SpO2: 96 % O2 Device: None (Room air)    Vital Signs with Ranges  Temp:  [97.1  F (36.2  C)-98.6  F (37  C)] 98.6  F (37  C)  Pulse:  [100] 100  Heart Rate:  [] 115  Resp:  [18-22]  22  BP: ()/() 147/72  SpO2:  [94 %-96 %] 96 %  174 lbs 11.2 oz    Constitutional: no distress  Eyes: sclera nonicteric  ENT: trachea midline  Respiratory: clear  Cardiovascular: Tachycardic  GI: nondistended, nontender, bowel sounds present  Lymph/Hematologic: no lymphadenopathy  Skin: dry, no rash  Musculoskeletal: good muscle tone  Neurologic: no focal deficits  Neuropsychiatric: blunted affect      Tele:  Af/FLUTTER with HR's around 150 bpm.    Echo: 5-24: EF 60%, mild RVE with low-normal function, mod-sev TR, RVSP 45mmHg, mild AI      ASSESSMENT:  85-year-old male seen for junctional rhythm and AF with RVR consistent with tachy/kelly syndrome.  Now in AF with RVR.  I reviewed his tele and there was no significant pauses since admission.        RECOMMENDATIONS:  1.  AF with RVR in the setting of tachy/kelly syndrome -  Continue IV amiodarone for another 24 hours and as per my note yesterday the patient is not a good AC candidate.    Aniceto Hagen MD

## 2017-05-28 NOTE — PLAN OF CARE
Problem: Goal Outcome Summary  Goal: Goal Outcome Summary  Outcome: No Change  Patient total care, turn q 2 hours, fed 25% of dinner, taking cold H2O well.  Tele accelerated junctional, occ appears afib, Rate 80's, amiodarone gtt cont at 0.5mg/hr. BP stable. F/C intact. Pain in control. Had BM on bedpan.Special code-no transfer to ICU.

## 2017-05-28 NOTE — PROGRESS NOTES
[facility]  Hospitalist Progress Note  Hardeep Man,  05/28/2017    Reason for Stay (Diagnosis): Bradycardia         Assessment and Plan:      Summary of Stay: Edil Lopez is a 85 year old male admitted on 5/22/2017 with need for rutledge replacement by urology, DWAIN and junctional bradycardia in the 30s.  Found to have E coli UTI.  Hx of dementia, HTN, CKD.        Problem List:       1. Tachy/Jw syndrome.  Cardiology input appreciated.  Continue Amiodarone drip.  Continue telemetry monitoring.  2. UTI.  On ceftriaxone since 5/24.  Urine culture with two strains of E coli both of which are resistant to levofloxacin.  Will plan for 7 days of antibiotics in total, with continued IV while inpatient.  Today's dose will be day 5.  3. Urine retention.  Chronic.  Appreciate Urology input.  Rutledge cath in place.    4. Dysphagia.  Speech path consulting but patient had difficulty cooperating due to dementia.  Continue Dysphagia level 1 diet for now.  5. Acute on chronic kidney failure.  Likely due to obstruction.   Creatinine 0.88 today and back to baseline.  Avoid nephrotoxins as much as possible.  6. Hypernatremia.  Improved to 146 today.  Continue to encourage free water intake.  No planned BMP tomorrow.   7. Hypokalemia.  Potassium replacement protocol.  8. GERD.  Protonix.  TUMS PRN.  9. HTN.  Continue Norvasc, Hydralazine, and Isordil.  10. Chronic pain syndrome.  Pain team following.    11. Dementia.  Chronic.          DVT Prophylaxis: Pneumatic Compression Devices  Code Status: DNR / DNI  Discharge Dispo: LTC in 1-3 days        Interval History (Subjective):      Denies CP, SOB, F/C, N/V, or diarrhea.                  Physical Exam:      Last Vital Signs:  /53 (BP Location: Right arm)  Pulse 100  Temp 98.1  F (36.7  C) (Oral)  Resp 20  Wt 79.2 kg (174 lb 11.2 oz)  SpO2 96%  BMI 28.2 kg/m2    Gen:  NAD, A&Ox1 to self.  Trouble with place and time.  Eyes:  PERRL, sclera anicteric.  OP:   MMM, no lesions.  Neck:  Supple.  CV:  Irregular, no murmurs.  Lung:  CTA b/l, normal effort.  Ab:  +BS, soft.  Skin:  Warm, dry to touch.  No rash.  Ext:  Mild non pitting edema LE b/l.           Medications:      All current medications were reviewed with changes reflected in problem list.         Data:      All new lab and imaging data was reviewed.   Labs:    Recent Labs  Lab 05/28/17  0620   *   POTASSIUM 3.5   CHLORIDE 118*   CO2 20   ANIONGAP 8   *   BUN 19   CR 0.88   GFRESTIMATED 82   GFRESTBLACK >90African American GFR Calc   JENNA 9.5       Recent Labs  Lab 05/26/17  0455 05/25/17  0640   WBC 12.0* 16.9*   HGB  --  9.5*   HCT  --  30.7*   MCV  --  97   PLT  --  235      Imaging:   No results found for this or any previous visit (from the past 24 hour(s)).

## 2017-05-28 NOTE — PLAN OF CARE
Problem: Goal Outcome Summary  Goal: Goal Outcome Summary  Outcome: No Change  Patient total care, turn Q2hour. F/ C intact, draining well. Fed meals 25% appetite. Amiodarone gtt cont at 0.5mg/hr-per cards-keep 24 hour more. Tele Afib/flutter HR 70-occ 120. BP stable.

## 2017-05-28 NOTE — PLAN OF CARE
Problem: Goal Outcome Summary  Goal: Goal Outcome Summary  SLP Note: Attempted to see pt for dysphagia tx. Patient was roused from sleep, but declined to participate in PO trials. Will follow-up tomorrow as able/appropriate.

## 2017-05-28 NOTE — PROVIDER NOTIFICATION
"Pt experiencing increased confusion, agitation, and uncomfortable.  Paged nocturnist: \"Increased agitation I think d/t uncomfortable/pain.BP increased, HR in 70s and 80s  Increased confusion.  Already gave prn Ativan and dilaudid. Would it be possible to try a one time po oxy or??\"  "

## 2017-05-28 NOTE — PLAN OF CARE
Problem: Goal Outcome Summary  Goal: Goal Outcome Summary  Outcome: No Change  Increased confusion, calling out, grimacing throughout the night.  More hypertensive.  Removed his left IV (amio gtt).  Attempted to remove rutledge.  PRN ativan and dilaudid given.  Still agitated.  Requested one time dose of oxy for break through pain.

## 2017-05-29 ENCOUNTER — APPOINTMENT (OUTPATIENT)
Dept: SPEECH THERAPY | Facility: CLINIC | Age: 82
DRG: 683 | End: 2017-05-29
Payer: COMMERCIAL

## 2017-05-29 LAB — POTASSIUM SERPL-SCNC: 4.2 MMOL/L (ref 3.4–5.3)

## 2017-05-29 PROCEDURE — 92526 ORAL FUNCTION THERAPY: CPT | Mod: GN | Performed by: SPEECH-LANGUAGE PATHOLOGIST

## 2017-05-29 PROCEDURE — 25000132 ZZH RX MED GY IP 250 OP 250 PS 637: Performed by: INTERNAL MEDICINE

## 2017-05-29 PROCEDURE — 99232 SBSQ HOSP IP/OBS MODERATE 35: CPT | Performed by: INTERNAL MEDICINE

## 2017-05-29 PROCEDURE — 12000007 ZZH R&B INTERMEDIATE

## 2017-05-29 PROCEDURE — 25000128 H RX IP 250 OP 636: Performed by: INTERNAL MEDICINE

## 2017-05-29 PROCEDURE — 25000132 ZZH RX MED GY IP 250 OP 250 PS 637: Performed by: PHYSICIAN ASSISTANT

## 2017-05-29 PROCEDURE — 36416 COLLJ CAPILLARY BLOOD SPEC: CPT | Performed by: HOSPITALIST

## 2017-05-29 PROCEDURE — 84132 ASSAY OF SERUM POTASSIUM: CPT | Performed by: HOSPITALIST

## 2017-05-29 PROCEDURE — 40000225 ZZH STATISTIC SLP WARD VISIT: Performed by: SPEECH-LANGUAGE PATHOLOGIST

## 2017-05-29 PROCEDURE — 25000132 ZZH RX MED GY IP 250 OP 250 PS 637: Performed by: CLINICAL NURSE SPECIALIST

## 2017-05-29 PROCEDURE — 25000128 H RX IP 250 OP 636: Performed by: CLINICAL NURSE SPECIALIST

## 2017-05-29 PROCEDURE — 25000125 ZZHC RX 250: Performed by: INTERNAL MEDICINE

## 2017-05-29 RX ORDER — AMIODARONE HYDROCHLORIDE 200 MG/1
400 TABLET ORAL DAILY
Status: DISCONTINUED | OUTPATIENT
Start: 2017-05-29 | End: 2017-05-31

## 2017-05-29 RX ADMIN — HYDRALAZINE HYDROCHLORIDE 25 MG: 25 TABLET ORAL at 06:08

## 2017-05-29 RX ADMIN — Medication 0.2 MG: at 03:43

## 2017-05-29 RX ADMIN — ISOSORBIDE DINITRATE 10 MG: 10 TABLET ORAL at 16:11

## 2017-05-29 RX ADMIN — OXYCODONE HYDROCHLORIDE 2.5 MG: 5 TABLET ORAL at 20:46

## 2017-05-29 RX ADMIN — AMIODARONE HYDROCHLORIDE 0.5 MG/MIN: 50 INJECTION, SOLUTION INTRAVENOUS at 09:00

## 2017-05-29 RX ADMIN — AMIODARONE HYDROCHLORIDE 400 MG: 200 TABLET ORAL at 13:54

## 2017-05-29 RX ADMIN — Medication 0.2 MG: at 00:26

## 2017-05-29 RX ADMIN — SENNOSIDES AND DOCUSATE SODIUM 1 TABLET: 8.6; 5 TABLET ORAL at 09:43

## 2017-05-29 RX ADMIN — ACETAMINOPHEN 1000 MG: 500 TABLET, FILM COATED ORAL at 13:54

## 2017-05-29 RX ADMIN — LORAZEPAM 0.5 MG: 2 CONCENTRATE ORAL at 09:43

## 2017-05-29 RX ADMIN — LORAZEPAM 0.26 MG: 2 CONCENTRATE ORAL at 16:52

## 2017-05-29 RX ADMIN — OXYCODONE HYDROCHLORIDE 2.5 MG: 5 TABLET ORAL at 16:11

## 2017-05-29 RX ADMIN — ISOSORBIDE DINITRATE 10 MG: 10 TABLET ORAL at 21:34

## 2017-05-29 RX ADMIN — HYDRALAZINE HYDROCHLORIDE 25 MG: 25 TABLET ORAL at 13:54

## 2017-05-29 RX ADMIN — PANTOPRAZOLE SODIUM 40 MG: 40 TABLET, DELAYED RELEASE ORAL at 09:43

## 2017-05-29 RX ADMIN — OXYCODONE HYDROCHLORIDE 2.5 MG: 5 TABLET ORAL at 13:47

## 2017-05-29 RX ADMIN — ACETAMINOPHEN 1000 MG: 500 TABLET, FILM COATED ORAL at 20:46

## 2017-05-29 RX ADMIN — SENNOSIDES AND DOCUSATE SODIUM 1 TABLET: 8.6; 5 TABLET ORAL at 20:46

## 2017-05-29 RX ADMIN — LORAZEPAM 0.26 MG: 2 CONCENTRATE ORAL at 22:28

## 2017-05-29 RX ADMIN — AMLODIPINE BESYLATE 10 MG: 10 TABLET ORAL at 09:52

## 2017-05-29 RX ADMIN — HYDRALAZINE HYDROCHLORIDE 25 MG: 25 TABLET ORAL at 21:34

## 2017-05-29 RX ADMIN — LORAZEPAM 0.26 MG: 2 CONCENTRATE ORAL at 04:32

## 2017-05-29 RX ADMIN — ACETAMINOPHEN 1000 MG: 500 TABLET, FILM COATED ORAL at 09:43

## 2017-05-29 RX ADMIN — ISOSORBIDE DINITRATE 10 MG: 10 TABLET ORAL at 09:43

## 2017-05-29 RX ADMIN — CEFTRIAXONE SODIUM 1 G: 1 INJECTION, POWDER, FOR SOLUTION INTRAMUSCULAR; INTRAVENOUS at 21:26

## 2017-05-29 RX ADMIN — OXYCODONE HYDROCHLORIDE 2.5 MG: 5 TABLET ORAL at 09:51

## 2017-05-29 RX ADMIN — AMIODARONE HYDROCHLORIDE 0.5 MG/MIN: 50 INJECTION, SOLUTION INTRAVENOUS at 01:25

## 2017-05-29 NOTE — PROGRESS NOTES
Cardiology follow up note:    No new recommendations at this time.  I will plan to change the patient to PO amiodarone which appears to be controlling his HR reasonably well without significant bradycardia.    Aniceto Hagen MD

## 2017-05-29 NOTE — PLAN OF CARE
Problem: Goal Outcome Summary  Goal: Goal Outcome Summary  Outcome: No Change  Pt. Alert to self, yells out at times, up with assist of 2/lift, Turned/repositioned Q2, Tele: Antonina, Pt. On amiodarone drip at 0.5mg/min, (30cc/hr), Pt. C/o left arm/side pain and given Dilaudid with decrease in pain/discomfort. Ativan given x1 this shift for increased confusion. Lung sounds diminished, room air sat's at 95-97%.Ortiz draining to gravity,  Plan: Abx (Ceftriaxone) for UTI, Amiodarone drip for antonina, per MD notes DC to LTC in 1-3 days.Pt. Denies any needs at this time. Will continue with POC.

## 2017-05-29 NOTE — PLAN OF CARE
Problem: Goal Outcome Summary  Goal: Goal Outcome Summary  Outcome: Therapy, progress towards functional goals is fair  SLP;  Pt continues with slow A-P movement of DD1 texture due to oral weakness.  Not ready for upgrade to DD2.  Tolerating thin liquids without overt s/s aspiration.  Full assist feeding.     Discharge Planner   Patient plan for D/C: LTC for SLP Dysphagia treatment  Current status: tolerating current diet, not ready for upgrade.  Needs full assist 1:1 for eating.  Small bites/sips. Alternate textures, fully upright for oral intake and upright 45 minutes after  Barriers to return to prior living situation: Impaired cognition, not safe independently  Recommendations for D/C: Ongoing SLP services for dysphagia  Rationale for D/C recommendations: Not at his baseline for swallowing       Entered by: Lorna Wolfe 05/29/2017 9:14 AM

## 2017-05-29 NOTE — PLAN OF CARE
Problem: Goal Outcome Summary  Goal: Goal Outcome Summary  Outcome: Therapy, progress towards functional goals is fair  Ambulatory Status:  Pt up with Ax2 to reposition and use of lift.  VS:  Stable, afebrile  Pain:  Very sensitive to pain- oxy given scheduled along with tylenol  Resp: LS Diminished and shallow  Cardiac: TELE: A Flutter- changed from amiodarone drip to oral.  GI:  denies nausea.  fair appetite and on DD1 puree/ thin liquid diet.  BS active.  Passing flatus.  Last BM today.  :  Catheter in place, patent and flowing  Skin:  Scabs, bruising, dry  Tx:  Abx, ativan, heart meds  Labs:  K 4.2  Consults:  cardiology  Disposition:  TBD- 1-3days back to [facility] will continue to monitor.

## 2017-05-29 NOTE — PROGRESS NOTES
Patient seen and examined by me this morning.  He is a 85-year-old male with multiple medical problems including dementia, chronic kidney disease and currently having tachybradycardia syndrome on amiodarone drip.  Patient is also on ceftriaxone for UTI.  Patient is very weak and confused requiring total care.  Plan is to continue to monitor, cardiology to follow patient and recommend medications on discharge.  Discharge planning.

## 2017-05-29 NOTE — PROGRESS NOTES
[facility]  Hospitalist Progress Note  Stanislaw Franco MD 05/29/2017    Reason for Stay (Diagnosis): Bradycardia         Assessment and Plan:      Summary of Stay: Edil Lopez is a 85 year old male admitted on 5/22/2017 with need for rutledge replacement by urology, DWAIN and junctional bradycardia in the 30s.  Found to have E coli UTI.  Hx of dementia, HTN, CKD.        Problem List:       1. Tachy/Jw syndrome.  Cardiology input appreciated. On  Amiodarone drip.cardiology following   Continue telemetry monitoring.  2. UTI.  On ceftriaxone since 5/24.  Urine culture with two strains of E coli both of which are resistant to levofloxacin.  Will plan for 7 days of antibiotics in total, with continued IV while inpatient.  Today's dose will be day 6.  3. Urine retention.  Chronic.  Appreciate Urology input.  Rutledge cath in place.    4. Dysphagia.  Speech path consulting but patient had difficulty cooperating due to dementia.  Continue Dysphagia level 1 diet for now.  5. Acute on chronic kidney failure.  Likely due to obstruction.   Creatinine normal ay and back to baseline.  Avoid nephrotoxins as much as possible.  6. Hypernatremia.: Improved , monitor .   7. Hypokalemia.  Potassium replacement protocol.  8. GERD.  Protonix.  TUMS PRN.  9. HTN.  Continue Norvasc, Hydralazine, and Isordil.  10. Chronic pain syndrome.  Pain team following.    11. Dementia.  Chronic.          DVT Prophylaxis: Pneumatic Compression Devices  Code Status: DNR / DNI  Discharge Dispo: LTC in  Two  days        Interval History (Subjective):      Patient seen and examined by me this morning.  He is a 85-year-old male with multiple medical problems including dementia, chronic kidney disease and currently having tachybradycardia syndrome on amiodarone drip.  Patient is also on ceftriaxone for UTI.  Patient is very weak and confused requiring total care.                  Physical Exam:      Last Vital Signs:  /79 (BP Location:  Right arm)  Pulse 100  Temp 98.3  F (36.8  C) (Oral)  Resp 16  Wt 76.6 kg (168 lb 12.8 oz)  SpO2 94%  BMI 27.25 kg/m2    Gen:  NAD, A&Ox1 to self.  Trouble with place and time.  Eyes:  PERRL, sclera anicteric.  OP:  MMM, no lesions.  Neck:  Supple.  CV:  Irregular, no murmurs.  Lung:  CTA b/l, normal effort.  Ab:  +BS, soft.  Skin:  Warm, dry to touch.  No rash.  Ext:  Mild non pitting edema LE b/l.           Medications:      All current medications were reviewed with changes reflected in problem list.         Data:      All new lab and imaging data was reviewed.   Labs:    Recent Labs  Lab 05/29/17  0654 05/28/17  0620   NA  --  146*   POTASSIUM 4.2 3.5   CHLORIDE  --  118*   CO2  --  20   ANIONGAP  --  8   GLC  --  117*   BUN  --  19   CR  --  0.88   GFRESTIMATED  --  82   GFRESTBLACK  --  >90African American GFR Calc   JENNA  --  9.5       Recent Labs  Lab 05/26/17  0455 05/25/17  0640   WBC 12.0* 16.9*   HGB  --  9.5*   HCT  --  30.7*   MCV  --  97   PLT  --  235      Imaging:   No results found for this or any previous visit (from the past 24 hour(s)).

## 2017-05-30 ENCOUNTER — APPOINTMENT (OUTPATIENT)
Dept: SPEECH THERAPY | Facility: CLINIC | Age: 82
DRG: 683 | End: 2017-05-30
Payer: COMMERCIAL

## 2017-05-30 PROCEDURE — 25000132 ZZH RX MED GY IP 250 OP 250 PS 637: Performed by: INTERNAL MEDICINE

## 2017-05-30 PROCEDURE — 25000132 ZZH RX MED GY IP 250 OP 250 PS 637: Performed by: CLINICAL NURSE SPECIALIST

## 2017-05-30 PROCEDURE — 92526 ORAL FUNCTION THERAPY: CPT | Mod: GN

## 2017-05-30 PROCEDURE — 40000225 ZZH STATISTIC SLP WARD VISIT

## 2017-05-30 PROCEDURE — 25000132 ZZH RX MED GY IP 250 OP 250 PS 637: Performed by: HOSPITALIST

## 2017-05-30 PROCEDURE — 99232 SBSQ HOSP IP/OBS MODERATE 35: CPT | Performed by: PHYSICIAN ASSISTANT

## 2017-05-30 PROCEDURE — 12000007 ZZH R&B INTERMEDIATE

## 2017-05-30 PROCEDURE — 99232 SBSQ HOSP IP/OBS MODERATE 35: CPT | Performed by: CLINICAL NURSE SPECIALIST

## 2017-05-30 PROCEDURE — 25000132 ZZH RX MED GY IP 250 OP 250 PS 637: Performed by: PHYSICIAN ASSISTANT

## 2017-05-30 PROCEDURE — 99232 SBSQ HOSP IP/OBS MODERATE 35: CPT | Performed by: INTERNAL MEDICINE

## 2017-05-30 RX ADMIN — POLYETHYLENE GLYCOL 3350 17 G: 17 POWDER, FOR SOLUTION ORAL at 08:26

## 2017-05-30 RX ADMIN — AMLODIPINE BESYLATE 10 MG: 10 TABLET ORAL at 08:27

## 2017-05-30 RX ADMIN — LORAZEPAM 0.5 MG: 2 CONCENTRATE ORAL at 08:25

## 2017-05-30 RX ADMIN — OXYCODONE HYDROCHLORIDE 2.5 MG: 5 TABLET ORAL at 12:12

## 2017-05-30 RX ADMIN — AMIODARONE HYDROCHLORIDE 400 MG: 200 TABLET ORAL at 08:27

## 2017-05-30 RX ADMIN — LORAZEPAM 0.26 MG: 2 CONCENTRATE ORAL at 01:26

## 2017-05-30 RX ADMIN — PANTOPRAZOLE SODIUM 40 MG: 40 TABLET, DELAYED RELEASE ORAL at 08:26

## 2017-05-30 RX ADMIN — ISOSORBIDE DINITRATE 10 MG: 10 TABLET ORAL at 08:27

## 2017-05-30 RX ADMIN — ISOSORBIDE DINITRATE 10 MG: 10 TABLET ORAL at 22:10

## 2017-05-30 RX ADMIN — SENNOSIDES AND DOCUSATE SODIUM 1 TABLET: 8.6; 5 TABLET ORAL at 08:27

## 2017-05-30 RX ADMIN — ACETAMINOPHEN 975 MG: 160 SUSPENSION ORAL at 17:41

## 2017-05-30 RX ADMIN — ACETAMINOPHEN 1000 MG: 500 TABLET, FILM COATED ORAL at 08:26

## 2017-05-30 RX ADMIN — BISACODYL 10 MG: 10 SUPPOSITORY RECTAL at 20:44

## 2017-05-30 RX ADMIN — HYDRALAZINE HYDROCHLORIDE 25 MG: 25 TABLET ORAL at 14:17

## 2017-05-30 RX ADMIN — OXYCODONE HYDROCHLORIDE 2.5 MG: 5 TABLET ORAL at 17:43

## 2017-05-30 RX ADMIN — ACETAMINOPHEN 1000 MG: 500 TABLET, FILM COATED ORAL at 14:17

## 2017-05-30 RX ADMIN — ISOSORBIDE DINITRATE 10 MG: 10 TABLET ORAL at 17:43

## 2017-05-30 RX ADMIN — HYDRALAZINE HYDROCHLORIDE 25 MG: 25 TABLET ORAL at 05:42

## 2017-05-30 RX ADMIN — LORAZEPAM 0.5 MG: 2 CONCENTRATE ORAL at 20:44

## 2017-05-30 RX ADMIN — OXYCODONE HYDROCHLORIDE 2.5 MG: 5 TABLET ORAL at 08:27

## 2017-05-30 RX ADMIN — HYDRALAZINE HYDROCHLORIDE 25 MG: 25 TABLET ORAL at 22:10

## 2017-05-30 NOTE — PROGRESS NOTES
[facility]  Hospitalist Progress Note  Stanislaw Franco MD 05/30/2017    Reason for Stay (Diagnosis): Bradycardia         Assessment and Plan:      Summary of Stay: Edil Lopez is a 85 year old male admitted on 5/22/2017 with need for rutledge replacement by urology, DWAIN and junctional bradycardia in the 30s.  Found to have E coli UTI.  Hx of dementia, HTN, CKD.        Problem List:       1. Tachy/Jw syndrome.  Cardiology input appreciated. On  Amiodarone cardiology following   Continue telemetry monitoring.  2. UTI.  On ceftriaxone since 5/24.  Urine culture with two strains of E coli both of which are resistant to levofloxacin.  Will plan for 7 days of antibiotics in total, with continued IV while inpatient.  Today's dose will be day 7, will d/c abx   3. Urine retention.  Chronic.  Appreciate Urology input.  Rutledge cath in place.    4. Dysphagia.  Speech path consulting but patient had difficulty cooperating due to dementia.  Continue Dysphagia level 1 diet for now.Still main concern and patient is at risk of recurrent aspiration and pneumonia   5. Acute on chronic kidney failure.  Likely due to obstruction.   Creatinine normal  and back to baseline.  Avoid nephrotoxins as much as possible.  6. Hypernatremia.: Improved , monitor .   7. Hypokalemia.  Potassium replacement protocol.  8. GERD.  Protonix.  TUMS PRN.  9. HTN.  Continue Norvasc, Hydralazine, and Isordil.  10. Chronic pain syndrome.  Pain team following.    11. Dementia.  Chronic.  12. General goal of care: Patient may benefit from hospice care and palliative approach than restorative care.I spoke with TLC team and to be discussed with family           DVT Prophylaxis: Pneumatic Compression Devices  Code Status: DNR / DNI  Discharge Dispo: LTC  After seen by hospice team . Patient is at high risk of readmission and will get care coordination team consult as well           Interval History (Subjective):      No new changes , no complaints    Patient had difficulty with swallowing and Speech therapy to se ritter again                     Physical Exam:      Last Vital Signs:  /61 (BP Location: Right arm)  Pulse 100  Temp 98.4  F (36.9  C) (Axillary)  Resp 20  Wt 76.3 kg (168 lb 4.8 oz)  SpO2 97%  BMI 27.16 kg/m2    Gen:  NAD, A&Ox1 to self.  Trouble with place and time.  Eyes:  PERRL, sclera anicteric.  OP:  MMM, no lesions.  Neck:  Supple.  CV:  Irregular, no murmurs.  Lung:  CTA b/l, normal effort.  Ab:  +BS, soft.  Skin:  Warm, dry to touch.  No rash.  Ext:  Mild non pitting edema LE b/l.           Medications:      All current medications were reviewed with changes reflected in problem list.         Data:      All new lab and imaging data was reviewed.   Labs:    Recent Labs  Lab 05/29/17  0654 05/28/17  0620   NA  --  146*   POTASSIUM 4.2 3.5   CHLORIDE  --  118*   CO2  --  20   ANIONGAP  --  8   GLC  --  117*   BUN  --  19   CR  --  0.88   GFRESTIMATED  --  82   GFRESTBLACK  --  >90African American GFR Calc   JENNA  --  9.5       Recent Labs  Lab 05/26/17  0455 05/25/17  0640   WBC 12.0* 16.9*   HGB  --  9.5*   HCT  --  30.7*   MCV  --  97   PLT  --  235      Imaging:   No results found for this or any previous visit (from the past 24 hour(s)).

## 2017-05-30 NOTE — PLAN OF CARE
Problem: Goal Outcome Summary  Goal: Goal Outcome Summary  SLP: Attempted to see pt for dysphagia tx however pt sleeping soundly and did not wake despite encouragement. RN reports pt noted to cough with PO this AM. Will re-attempt later as able.

## 2017-05-30 NOTE — PLAN OF CARE
Problem: Goal Outcome Summary  Goal: Goal Outcome Summary  Outcome: No Change  Patient up to chair with lift. Patient says he always has pain somewhere. He is on scheduled oxycodone, ativan, and tylenol. Ortiz in place. Appetite good, diet changed by speech to nectar thick clear liquids due to aspiration. Took several naps throughout day.

## 2017-05-30 NOTE — PLAN OF CARE
Problem: Goal Outcome Summary  Goal: Goal Outcome Summary  Outcome: Improving  BP elevated. See below. Oriented to self only. Tele A-flutter CVR/RVR. Had HR in 120's for short period of time when patient was anxious. Ativan given and HR returned to 70's. Patient anxious at times but cooperative and redirectable. Lung sounds diminished. Transferred to chair with mechanical lift. Ortiz remains in place. Turned and repositioned q2h. Anticipates discharge to [facility] this week.            05/29/17 2039 05/29/17 2134   Vital Signs   /83 161/66

## 2017-05-30 NOTE — PROGRESS NOTES
[facility]  Palliative Care Progress Note  Text Page    5/30 1:50pm left message for Filemon Calvert for update and goals of care planning. He is expected back to the office after 3:30 today.   5:30pm - no return call today. Will call in am.       Assessment & Plan   I was asked to see the patient for goals of care.     Recommendations:  1. Pain - tylenol 975mg q 8 hours. Decrease Oxycodone scheduled (from PTA) to 2.5 mg 4 times daily and hold for sedation .Hydromorphone 0.2mg IV q 3 hours prn pain not relieved with oxycodone. Reposition for comfort.  2. Agitation - Decrease Ativan to 0.25mg BID. Prn ativan 0.25mg q 8 hours prn. DC seroquel. Promote food and fluids. Appreciate nursing assistance to keep pt awake during daytime.  following.  3. Constipation - miralax daily scheduled, and miralax daily prn, senna-docusate 1-2 tabs po BID, dulcolax suppository prn.      Goal of Care: DNR/DNI.  Per guardian Filemon Calvert, ok for IV or oral medications to control pt's heart rate. NO ICU transfers, no pressors.   He  would like to talk with pt's children prior to making him comfort care. If pt is discharged back to [facility], he will follow up there and update pt's POLST to comfort care. Filemon requests [facility] to assess pt for Hospice and would like him enrolled if he meets criteria. If pt can not be assessed for Hospice prior to discharge, Filemon would like it arranged for them to have this done at [facility] after discharge. Emergency number for guardian if issues arise is [phone] Raulito Flores.     Disease Process/es & Symptoms:  Edil Lopez is a 85 year old patient admitted for rutledge catheter replacement requiring urology consult to replace in ED. Pt was also noted with symptoms of bradycardia. He has been treated for junctional rhythm, dysphagia, UTI, urinary retention, ARF with CKD and HTN.   Pt has a hx of SP catheter for BPH and urinary retention, but had been converted to  rutledge catheter. His catheter had fallen out approximately 2 weeks ago and was successfully replaced by NH staff, and was examined by Zelda BARRIGA from [facility] in [place].      This is in the setting of dementia, hx of CVA, s/p carotid endarterectomy, HTN, CKD, GERD, PVD, Hypothyroidism, Hyperparathyroidism. Guardian reports that pt has had a 9# weight loss over 6 month period. He had been assessed for HOSPICE appropriateness at NH, however did not meet criteria. Guardian believes pt is comfort care at [facility] per POLST.       He has been hospitalized in the ED for rutledge catheter or SP catheter problems and UTI  2 times in the past 9 months. In Pt's clinical record, his weight is increased 20# since 5/2016.     The following symptoms are noted as concerning to pt quality of life - per nursing staff, providers and guardian.  Pain in low abdomin  Agitation     Psychosocial/Spiritual Needs:     Oriented guardian to Spiritual Health and Social Work Services as part of Palliative Care team.  is following.     Decision-Making & Goals of Care:  Discussion/counseling today about goals of care/decisions:   5/30/2017 Message left with shahid Colbert. Message left for  Karl to follow pt on 6/1/17. Discussed with SWS regarding discharge plan with possible assessment by [facility] after return back to [facility].  5/26/2017 Met with pt's shahid Calvert and Dr. Man.  Reviewed pt's current status with heart rhythm issues, UTI with underlying dementia. Risks and benefits of various medical and procedural interventions in pt with dementia explained. Guardian shares that pt's son, dtr and niece are not involved with pt, do not visit. Pt does not interact when taken to special events at [facility]. Pt is always in bed, often sleeping, incont of bowel. No pictures on the wall, no apparent belgica or pleasures. Pt does speak. Is total care for feeding, bathing and other ADLS. He does not  walk. He does not sit up independently. Filemon is comfortable with a hospice, palliative approach but would like to talk with pt's children before making decision for comfort care as he knows pt will stop his heart medication. Filemon requests for [facility] to assist with [facility] assessment for Hospice support for pt at discharge back to [facility]. Filemon states not to take pt to ICU, or offer pressors. IV or oral medications to help with heart rhythm are ok. Filemon understands that pt will be discharged back to [facility] when MDs feel that his UTI is improving and he is stable with his heart rhythm and blood pressure. POLST from 10/10/2014 is still accurate and [facility] has a copy of this.  5/25/17 Per MARY, pt has a guardian. [facility] has been attempting to obtain contact information and documents for much of the day.  Was contacted by shahid Calvert at 3:30 pm today. Per guardian, Pt and his wife were both residents of [facility]. Wife passed away 6 months ago in Hospice. [facility] at [facility] had recommended hospice 9-10 months ago for pt, however, he did not meet criteria at that time.  Guardian states pt's baseline currently is that he is verbal, but forgetful and no short term memory. Pt is in bed when guardian sees him although [facility] staff state he gets up to a WC. Pt is incontinent of bowel. Has indwelling catheter. Requires help for cares and is fed. Shahid believes that pt is comfort care per his most recent POLST. Have requested a copy. Guardian works M-F, and only is available until 1pm on Friday. Have requested a care conference with Shahid, MARY, Dr. Man for 11:30am to review most recent POLST, pt condition, and establish POC. If pt now meets HOSPICE criteria, guardian reports that [facility] is the provider that would be selected.     Patient has decision-making capacity Unreliable  Patient has a court-appointed guardian/conservator for healthcare decisions in a legal document . See  name(s) and contact information in Health Care Agent/Legal Guardian section below.     Name: Filemon Calvert, Relationship: guardian, Phone(s): [phone]. Emergency number for weekends and holidays [phone].  Appeciate SWS help obtaining legal guardian documents.        Patient has a completed health care directive available in the chart (Y/N): N  Physician orders for life-sustaining treatment (POLST) form is on file dated 10/10/14.  Have confirmed that this is the most updated POLST with guardinoelle Colbert.  Code Status:                     Do not resusitate / Do not intubate     Oxana BARRIGA, CNS  Pain Management and Palliative Care  [facility]  Pgr: [phone]    Time Spent on this Encounter   I spent 15 minutes in assessment of the patient and discussion with the patient and family. Another 10 minutes in review of chart, documentation and discussion with the health care team.      Interval History   Pt awakes to name. More alert. Appears [place]. Squeezed my hands to command. More verbal. Nurses report pt having more coughing after eating. ST decreased dysphagia diet. Ortiz draining light yellow urine. Pedal edema less.    Review of Systems    Pain - nurses feel controlled with current pain regimen.  Agitation - pt drowsy, cooperative with simple commands, more verbal. No longer requiring an attendant at bedside.  Constipation      Palliative Symptom Review (0=no symptom/no concern, 1=mild, 2=moderate, 3=severe):        Pain - denies at present.   Agitation - calm at present.        Physical Exam   Temp:  [97.2  F (36.2  C)-98.4  F (36.9  C)] 98.4  F (36.9  C)  Heart Rate:  [] 77  Resp:  [16-20] 20  BP: (137-179)/(61-92) 137/61  SpO2:  [97 %-98 %] 97 %  168 lbs 4.8 oz  GEN:  Awakens to voice. Opens eyes. Short sentences and responses.  HEENT:  Normocephalic/atraumatic, no scleral icterus, no nasal discharge, mouth moist.  CV:  Occas irreg beat, 70's, S1, S2; .  +3 DP/PT pulses  "bilatererally; 1+ edema BLE.  RESP:  Diminished bilat anteriorly.  Symmetric chest rise on inhalation noted.  Normal respiratory effort.  ABD:  Rounded, soft, non-tender/non-distended.  +BS  EXT:  Edema & pulses as noted above.  Pale.  M/S:   Deferred.  SKIN:  Dry to touch, no exanthems noted in the visualized areas.    NEURO: Symmetric strength +5/5.   Psych:  Calm. Smiles. \"Tired\".    Medications        amiodarone  400 mg Oral Daily     hydrALAZINE  25 mg Oral Q8H LOLY     isosorbide dinitrate  10 mg Oral TID     oxyCODONE (ROXICODONE) IR half-tab 2.5 mg  2.5 mg Oral 4x Daily     LORazepam  0.26-0.5 mg Oral BID     sodium chloride (PF)  3 mL Intracatheter Q8H     amLODIPine  10 mg Oral Daily     pantoprazole  40 mg Oral QAM     cefTRIAXone  1 g Intravenous Q24H     senna-docusate  1-2 tablet Oral BID     acetaminophen (TYLENOL) tablet 1,000 mg  1,000 mg Oral TID     polyethylene glycol  17 g Oral Daily       Data   No results found for this or any previous visit (from the past 24 hour(s)).  "

## 2017-05-30 NOTE — PROGRESS NOTES
[facility]    Cardiology Progress Note    SUBJECTIVE: Sleeping    MEDICATIONS:  Current Facility-Administered Medications   Medication     amiodarone (PACERONE/CODARONE) tablet 400 mg     potassium chloride SA (K-DUR/KLOR-CON M) CR tablet 20-40 mEq     potassium chloride (KLOR-CON) Packet 20-40 mEq     potassium chloride 10 mEq in 100 mL intermittent infusion     potassium chloride 10 mEq in 100 mL intermittent infusion with 10 mg lidocaine     potassium chloride 20 mEq in 50 mL intermittent infusion     hydrALAZINE (APRESOLINE) tablet 25 mg     isosorbide dinitrate (ISORDIL) tablet 10 mg     oxyCODONE (ROXICODONE) IR half-tab 2.5 mg     LORazepam (ATIVAN) 2 MG/ML (HIGH CONC) solution 0.26-0.5 mg     LORazepam (ATIVAN) 2 MG/ML (HIGH CONC) solution 0.26 mg     sodium chloride (PF) 0.9% PF flush 3 mL     sodium chloride (PF) 0.9% PF flush 3 mL     HYDROmorphone (DILAUDID) injection 0.2 mg     amLODIPine (NORVASC) tablet 10 mg     calcium carbonate (TUMS) chewable tablet 1,000 mg     pantoprazole (PROTONIX) EC tablet 40 mg     bisacodyl (DULCOLAX) Suppository 10 mg     polyethylene glycol (MIRALAX/GLYCOLAX) Packet 17 g     cefTRIAXone (ROCEPHIN) 1 g vial to attach to  mL bag for ADULTS or NS 50 mL bag for PEDS     naloxone (NARCAN) injection 0.1-0.4 mg     senna-docusate (SENOKOT-S;PERICOLACE) 8.6-50 MG per tablet 1-2 tablet     acetaminophen (TYLENOL) tablet 1,000 mg     polyethylene glycol (MIRALAX/GLYCOLAX) Packet 17 g       ALLERGIES:  Allergies   Allergen Reactions     Lisinopril      Penicillins        PHYSICAL EXAM:  Temp: 98.4  F (36.9  C) Temp src: Axillary BP: (!) 172/92   Heart Rate: 101 Resp: 20 SpO2: 97 % O2 Device: None (Room air)    Vital Signs with Ranges  Temp:  [97.2  F (36.2  C)-98.4  F (36.9  C)] 98.4  F (36.9  C)  Heart Rate:  [] 101  Resp:  [16-20] 20  BP: (143-179)/(66-92) 172/92  SpO2:  [94 %-98 %] 97 %  168 lbs 4.8 oz    Constitutional: no distress, sleeping  Eyes:  sleeping, eyes closed  Respiratory: clear  Cardiovascular: irr, S1S2, no edema  GI: nondistended, nontender, bowel sounds present  Lymph/Hematologic: no lymphadenopathy  Skin: dry, no rash  Musculoskeletal: good muscle tone  Neurologic: no focal deficits      Tele:  Afib/aflutter rates 70s, up to 120s when anxious, but came back down to 70s with Ativan. Occasional PVCs.     Echo 5/24/17: EF 60%, mild RVE with low-normal function, mod-sev TR, RVSP 45mmHg, mild AI      ASSESSMENT:  85-year-old male  With history of at least moderate dementia, history of urinary retention and had a supra pubic catheter. This became dislodged which is why he presented to the emergency department for this issue.  He was found to be in a junctional rhythm with heart rate around 40, blood pressure was 160/100, there was no evidence of hypo-perfusion.  There is no reported lightheadedness, dizziness, or syncope. Cardiology consulted.   He had been on Toprol 200 mg b.i.d. since at least 2014, help since admission on May 22. 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 No significant pauses since admission.      RECOMMENDATIONS:  1.  Afib/aflutter with RVR and junctional rhythm c/w tachy/kelly syndrome -  Was on high dose Toprol XL which has been discontinued. Using amiodarone for rate control, seems to have reasonable rate control without causing significant bradycardia. Plan 400mg daily x 1 week, then 200 mg daily thereafter. The patient is not a good AC candidate.  Note plans for Palliative Care team to revisit. If he is Palliative/hospice, would continue with amiodarone for rate control. If not, and family wants to be aggressive, we would need to discuss possible PPM implantation (though not a good candidate).       Daron Cardoso PA-C

## 2017-05-30 NOTE — PLAN OF CARE
Problem: Goal Outcome Summary  Goal: Goal Outcome Summary  Outcome: Therapy, progress toward functional goals as expected  Discharge Planner SLP   Patient plan for discharge: return to LTC vs hospice?  Current status: Pt with consistent overt s/sx of aspiration marked by coughing on pureed and thin liquid trials. Pt tolerated nectar thick liquids via straw with no overt s/sx of aspiration. Recommend downgrade to nectar thick clear liquids with 1:1 supervision. Pt should be fully upright for all PO, take small single sips/bites, and pace self. Recommend medical team discuss pts overall POC with pts guardian given pts ongoing oropharyngeal dysphagia and suspect inability to meet nutritional needs orally  Barriers to return to prior living situation: Pts current level of deconditioning; suspected inability to meet nutritionally needs orally  Recommendations for discharge: Ongoing ST for dysphagia pending pts POC vs hospice  Rationale for recommendations: Pt w/ongoing dysphagia and is not yet at baseline diet level       Entered by: Sheridan Perez 05/30/2017 2:23 PM

## 2017-05-30 NOTE — PROGRESS NOTES
Patient seen and examined by me this morning.  Edil is a 85-year-old male with multiple medical problems including dementia and urinary retention admitted and currently treated for urinary tract infection and generalized weakness.  He has tachybradycardia syndrome for which amiodarone was started.  Patient's condition is not significantly improved and he recommended total care and given multiple medical problems patient may benefit from palliative approach including hospice care.  TLC team will be reconsulted to discuss with family if hospice care is acceptable to them.  He needs a long-term facility placement.  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  He has also left upper extremity swelling and tenderness likely from phlebitis from IV access which needs supportive care.

## 2017-05-30 NOTE — PLAN OF CARE
Problem: Goal Outcome Summary  Goal: Goal Outcome Summary  Outcome: No Change  Pt. Alert to self only, very confused, in need of lots of reminders and redirection. VSS, complaint of pain to left AC, noted there to be redness/swelling, elevated extremity. [facility]. Ortiz cath in place for urinary retention. Abx- Rocephin for UTI. PRN ativan administered x1 for increased restlessness/anxiety, yelling out. Loss of IV access x1, new one placed. Plan for d/c to LTC in 2 days.

## 2017-05-30 NOTE — PROGRESS NOTES
"CLINICAL NUTRITION SERVICES  -  ASSESSMENT NOTE    REASON FOR ASSESSMENT  Edil Lopez is a 85 year old male seen by the dietitian for [facility]    NUTRITION HISTORY  - Information obtained from chart review - unable to obtain diet hx from patient  - Patient is on a ?regular vs dysphagia diet at home.  - Food allergies/intolerances: NKFA  - Hx of dementia     CURRENT NUTRITION ORDERS  - Diet: DD1 with thin liquids per SLP  - Supplement:  none  Current Intake/Tolerance:  - Per flow sheet review, % intake for documented meals.  - Factors affecting nutrition intake include: weakness, dysphagia, coughing    PHYSICAL FINDINGS  Observed  Mild to moderate temporal wasting, shoulder/acromion area looks square, scapular pronounced. Hollow upper orbital area with loose skin and dark circles. Upper arm region with loose skin, some pinch depth  Obtained from Chart/Interdisciplinary Team  BM: 5/29  Total feed  Fluid status: +1 L/R foot edema    ANTHROPOMETRICS  Height: 5'6\"  Weight: 76.3 kg   Body mass index is 27.16 kg/(m^2).  Weight Status:  Overweight BMI 25-29.9  IBW: 64.5 kg +/- 10%  % IBW:  118%  Weight History:  Weight has increased, as noted below  Wt Readings from Last 10 Encounters:   05/30/17 76.3 kg (168 lb 4.8 oz)   10/28/16 65.8 kg (145 lb)   12/03/14 68 kg (150 lb)       LABS  Labs reviewed    MEDICATIONS  Medications reviewed    ASSESSED NUTRITION NEEDS (PER APPROVED PRACTICE GUIDELINES, Dosing weight: 76.3 kg):  Estimated Energy Needs: 1930-4786 kcals (25-30 Kcal/Kg)  Justification: maintenance  Estimated Protein Needs: 76-92 grams protein (1.2-1.5 g pro/Kg)  Justification: CKD and preservation of lean body mass  Estimated Fluid Needs: >1 mL/Kcal  Justification: maintenance    MALNUTRITION:  % Weight Loss:None noted  % Intake:Decreased intake does not meet criteria for malnutrition   Subcutaneous Fat Loss: None noted   Muscle Loss: Mild to moderate, as noted above  Fluid Retention: Trace, does not meet " criteria    Malnutrition Diagnosis: Patient does not meet two of the above criteria necessary for diagnosing malnutrition     NUTRITION DIAGNOSIS:  Swallowing Difficulty  related to disease progression as evidenced by total feeding assistance, DD1 and coughing with intake noted.    INTERVENTIONS  Recommendations / Nutrition Prescription  Diet consistency/texture per SLP + Diet appropriate oral nutrition supplements to increase calorie and protein intake    Palliative involved, consideration for comfort cares    Implementation  Nutrition education: Not appropriate at this time due to patient condition  Medical food supplement: Magic cup with meals  Collaboration and Referral of care: Discussed patient during interdisciplinary care rounds this morning    Goals  Patient to consume >/= 50% of meals TID with no s/s of aspiration    MONITORING AND EVALUATION:  Progress towards goals will be monitored and evaluated per protocol and Practice Guidelines      Maria Dolores Davis RDN   3rd floor/ICU: [phone]  All other floors: [phone]  Weekend/holiday: [phone]  Office: [phone]

## 2017-05-31 ENCOUNTER — APPOINTMENT (OUTPATIENT)
Dept: SPEECH THERAPY | Facility: CLINIC | Age: 82
DRG: 683 | End: 2017-05-31
Payer: COMMERCIAL

## 2017-05-31 LAB
ANION GAP SERPL CALCULATED.3IONS-SCNC: 8 MMOL/L (ref 3–14)
BUN SERPL-MCNC: 13 MG/DL (ref 7–30)
CALCIUM SERPL-MCNC: 9.9 MG/DL (ref 8.5–10.1)
CHLORIDE SERPL-SCNC: 112 MMOL/L (ref 94–109)
CO2 SERPL-SCNC: 21 MMOL/L (ref 20–32)
CREAT SERPL-MCNC: 0.96 MG/DL (ref 0.66–1.25)
ERYTHROCYTE [DISTWIDTH] IN BLOOD BY AUTOMATED COUNT: 16.7 % (ref 10–15)
GFR SERPL CREATININE-BSD FRML MDRD: 74 ML/MIN/1.7M2
GLUCOSE SERPL-MCNC: 89 MG/DL (ref 70–99)
HCT VFR BLD AUTO: 29.7 % (ref 40–53)
HGB BLD-MCNC: 9.2 G/DL (ref 13.3–17.7)
MCH RBC QN AUTO: 29.1 PG (ref 26.5–33)
MCHC RBC AUTO-ENTMCNC: 31 G/DL (ref 31.5–36.5)
MCV RBC AUTO: 94 FL (ref 78–100)
PLATELET # BLD AUTO: 311 10E9/L (ref 150–450)
POTASSIUM SERPL-SCNC: 4.1 MMOL/L (ref 3.4–5.3)
RBC # BLD AUTO: 3.16 10E12/L (ref 4.4–5.9)
SODIUM SERPL-SCNC: 141 MMOL/L (ref 133–144)
WBC # BLD AUTO: 12 10E9/L (ref 4–11)

## 2017-05-31 PROCEDURE — 25000132 ZZH RX MED GY IP 250 OP 250 PS 637: Performed by: INTERNAL MEDICINE

## 2017-05-31 PROCEDURE — 25000132 ZZH RX MED GY IP 250 OP 250 PS 637: Performed by: CLINICAL NURSE SPECIALIST

## 2017-05-31 PROCEDURE — 99233 SBSQ HOSP IP/OBS HIGH 50: CPT | Performed by: CLINICAL NURSE SPECIALIST

## 2017-05-31 PROCEDURE — 99232 SBSQ HOSP IP/OBS MODERATE 35: CPT | Performed by: INTERNAL MEDICINE

## 2017-05-31 PROCEDURE — 40000225 ZZH STATISTIC SLP WARD VISIT

## 2017-05-31 PROCEDURE — 85027 COMPLETE CBC AUTOMATED: CPT | Performed by: INTERNAL MEDICINE

## 2017-05-31 PROCEDURE — 99356 ZZC PROLONGED SERV,INPATIENT,1ST HR: CPT | Performed by: CLINICAL NURSE SPECIALIST

## 2017-05-31 PROCEDURE — 12000007 ZZH R&B INTERMEDIATE

## 2017-05-31 PROCEDURE — 25000132 ZZH RX MED GY IP 250 OP 250 PS 637: Performed by: PHYSICIAN ASSISTANT

## 2017-05-31 PROCEDURE — 92526 ORAL FUNCTION THERAPY: CPT | Mod: GN

## 2017-05-31 PROCEDURE — 25000132 ZZH RX MED GY IP 250 OP 250 PS 637: Performed by: HOSPITALIST

## 2017-05-31 PROCEDURE — 36415 COLL VENOUS BLD VENIPUNCTURE: CPT | Performed by: INTERNAL MEDICINE

## 2017-05-31 PROCEDURE — 80048 BASIC METABOLIC PNL TOTAL CA: CPT | Performed by: INTERNAL MEDICINE

## 2017-05-31 RX ORDER — AMIODARONE HYDROCHLORIDE 200 MG/1
400 TABLET ORAL DAILY
Status: DISCONTINUED | OUTPATIENT
Start: 2017-06-01 | End: 2017-06-01 | Stop reason: HOSPADM

## 2017-05-31 RX ORDER — AMIODARONE HYDROCHLORIDE 200 MG/1
200 TABLET ORAL DAILY
Status: DISCONTINUED | OUTPATIENT
Start: 2017-06-05 | End: 2017-06-01 | Stop reason: HOSPADM

## 2017-05-31 RX ADMIN — ACETAMINOPHEN 1000 MG: 160 SUSPENSION ORAL at 22:19

## 2017-05-31 RX ADMIN — HYDRALAZINE HYDROCHLORIDE 25 MG: 25 TABLET ORAL at 22:20

## 2017-05-31 RX ADMIN — OXYCODONE HYDROCHLORIDE 2.5 MG: 5 TABLET ORAL at 16:07

## 2017-05-31 RX ADMIN — POLYETHYLENE GLYCOL 3350 17 G: 17 POWDER, FOR SOLUTION ORAL at 08:55

## 2017-05-31 RX ADMIN — LORAZEPAM 0.5 MG: 2 CONCENTRATE ORAL at 20:48

## 2017-05-31 RX ADMIN — HYDRALAZINE HYDROCHLORIDE 25 MG: 25 TABLET ORAL at 06:18

## 2017-05-31 RX ADMIN — AMIODARONE HYDROCHLORIDE 400 MG: 200 TABLET ORAL at 08:55

## 2017-05-31 RX ADMIN — AMLODIPINE BESYLATE 10 MG: 10 TABLET ORAL at 08:55

## 2017-05-31 RX ADMIN — ISOSORBIDE DINITRATE 10 MG: 10 TABLET ORAL at 16:08

## 2017-05-31 RX ADMIN — OXYCODONE HYDROCHLORIDE 2.5 MG: 5 TABLET ORAL at 11:16

## 2017-05-31 RX ADMIN — OXYCODONE HYDROCHLORIDE 2.5 MG: 5 TABLET ORAL at 20:47

## 2017-05-31 RX ADMIN — ISOSORBIDE DINITRATE 10 MG: 10 TABLET ORAL at 08:55

## 2017-05-31 RX ADMIN — PANTOPRAZOLE SODIUM 40 MG: 40 TABLET, DELAYED RELEASE ORAL at 08:55

## 2017-05-31 RX ADMIN — ISOSORBIDE DINITRATE 10 MG: 10 TABLET ORAL at 22:20

## 2017-05-31 RX ADMIN — ACETAMINOPHEN 1000 MG: 160 SUSPENSION ORAL at 16:08

## 2017-05-31 RX ADMIN — ACETAMINOPHEN 1000 MG: 160 SUSPENSION ORAL at 08:55

## 2017-05-31 RX ADMIN — LORAZEPAM 0.5 MG: 2 CONCENTRATE ORAL at 08:56

## 2017-05-31 RX ADMIN — HYDRALAZINE HYDROCHLORIDE 25 MG: 25 TABLET ORAL at 13:34

## 2017-05-31 NOTE — PLAN OF CARE
Problem: Goal Outcome Summary  Goal: Goal Outcome Summary  Outcome: No Change  Pt. Alert to self, confused, in need of lots of reminders and redirection. VSS, complaint of pain to left AC, redness/swelling, elevated extremity. [facility] q2[facility]. Ortiz cath in place for urinary retention, output 1000cc. Minimal yelling out tonight, slept 75% of night. Plan for Palliative consult.

## 2017-05-31 NOTE — PLAN OF CARE
Problem: Goal Outcome Summary  Goal: Goal Outcome Summary  Outcome: Therapy, progress toward functional goals as expected  Discharge Planner SLP   Patient plan for discharge: LTC w/hospice pending palliative care conference  Current status: Recommend advance to dysphagia diet 1 and nectar thick liquids with 1:1 supervision. Straws OK. Pt should be fully upright for all PO, take small single sips/bites, alternate consistencies, and pace self. Pills to be crushed and served in small amount of puree.  Barriers to return to prior living situation: Pts current level of deconditioning; ongoing oropharyngeal dysphagia         Recommendations for discharge: Ongoing ST for dysphagia if pt does not elect for hospice cares  Rationale for recommendations: Pt is not yet at baseline diet level       Entered by: Sheridan Perez 05/31/2017 10:43 AM

## 2017-05-31 NOTE — PLAN OF CARE
Problem: Goal Outcome Summary  Goal: Goal Outcome Summary  Outcome: No Change  VSS, tele afib CVR.  Pt up with lift.  Given suppository and had large BM this shift.  Pt denies pain, nasuea.  Continue POC.

## 2017-05-31 NOTE — PROGRESS NOTES
[facility]  Hospitalist Progress Note  Stanislaw Franco MD 05/31/2017    Reason for Stay (Diagnosis): Bradycardia         Assessment and Plan:      Summary of Stay: Edil Lopez is a 85 year old male admitted on 5/22/2017 with need for rutledge replacement by urology, DWAIN and junctional bradycardia in the 30s.  Found to have E coli UTI.  Hx of dementia, HTN, CKD.        Problem List:       1. Tachy/Jw syndrome.  Cardiology input appreciated. On  Amiodarone cardiology following   Continue telemetry monitoring.  2. UTI.  On ceftriaxone since 5/24.  Urine culture with two strains of E coli both of which are resistant to levofloxacin.  completed  Antibiotics therapy    3. Urine retention.  Chronic.  Appreciate Urology input.  Rutledge cath in place.    4. Dysphagia.  Speech path consulting but patient had difficulty cooperating due to dementia.  Continue Dysphagia level 1 diet for now.Still main concern and patient is at risk of recurrent aspiration and pneumonia   5. Acute on chronic kidney failure.  Likely due to obstruction.   Creatinine normal  and back to baseline.  Avoid nephrotoxins as much as possible.  6. Hypernatremia.:resolved , monitor .   7. Hypokalemia.  Potassium replacement protocol.  8. GERD.  Protonix.  TUMS PRN.  9. HTN.  Continue Norvasc, Hydralazine, and Isordil.  10. Chronic pain syndrome.  Pain team following.    11. Dementia.  Chronic.  12. General goal of care: Patient may benefit from hospice care and palliative approach than restorative care.I spoke with TLC team and to be discussed with family -plan to meet with patient guardian today           DVT Prophylaxis: Pneumatic Compression Devices  Code Status: DNR / DNI  Discharge Dispo: LT tomorrow     Addendum   -- had a meeting with guardian and care plan discussed.Comfort measures elected and hospice team to see him at [facility].  -- D/C in am         Interval History (Subjective):      Patient is seen and examined by me today and  medical record reviewed.Overnight events noted and care discussed with nursing staff.    no new complaints                         Physical Exam:      Last Vital Signs:  /78 (BP Location: Right arm)  Pulse 94  Temp 98.8  F (37.1  C) (Oral)  Resp 20  Wt 76.3 kg (168 lb 4.8 oz)  SpO2 98%  BMI 27.16 kg/m2    Gen:  NAD, A&Ox1 to self.  Trouble with place and time.  Eyes:  PERRL, sclera anicteric.  OP:  MMM, no lesions.  Neck:  Supple.  CV:  Irregular, no murmurs.  Lung:  CTA b/l, normal effort.  Ab:  +BS, soft.  Skin:  Warm, dry to touch.  No rash.  Ext:  Mild non pitting edema LE b/l.           Medications:      All current medications were reviewed with changes reflected in problem list.         Data:      All new lab and imaging data was reviewed.   Labs:    Recent Labs  Lab 05/31/17  0618      POTASSIUM 4.1   CHLORIDE 112*   CO2 21   ANIONGAP 8   GLC 89   BUN 13   CR 0.96   GFRESTIMATED 74   GFRESTBLACK 89   JENNA 9.9       Recent Labs  Lab 05/31/17  0618   WBC 12.0*   HGB 9.2*   HCT 29.7*   MCV 94         Imaging:   No results found for this or any previous visit (from the past 24 hour(s)).

## 2017-05-31 NOTE — PROGRESS NOTES
[facility]    Cardiology Progress Note    SUBJECTIVE: Awake today. No complaints.     MEDICATIONS:  Current Facility-Administered Medications   Medication     acetaminophen (TYLENOL) solution 1,000 mg     pantoprazole (PROTONIX) suspension 40 mg     amiodarone (PACERONE/CODARONE) tablet 400 mg     potassium chloride SA (K-DUR/KLOR-CON M) CR tablet 20-40 mEq     potassium chloride (KLOR-CON) Packet 20-40 mEq     potassium chloride 10 mEq in 100 mL intermittent infusion     potassium chloride 10 mEq in 100 mL intermittent infusion with 10 mg lidocaine     potassium chloride 20 mEq in 50 mL intermittent infusion     hydrALAZINE (APRESOLINE) tablet 25 mg     isosorbide dinitrate (ISORDIL) tablet 10 mg     oxyCODONE (ROXICODONE) IR half-tab 2.5 mg     LORazepam (ATIVAN) 2 MG/ML (HIGH CONC) solution 0.26-0.5 mg     LORazepam (ATIVAN) 2 MG/ML (HIGH CONC) solution 0.26 mg     sodium chloride (PF) 0.9% PF flush 3 mL     sodium chloride (PF) 0.9% PF flush 3 mL     HYDROmorphone (DILAUDID) injection 0.2 mg     amLODIPine (NORVASC) tablet 10 mg     calcium carbonate (TUMS) chewable tablet 1,000 mg     bisacodyl (DULCOLAX) Suppository 10 mg     polyethylene glycol (MIRALAX/GLYCOLAX) Packet 17 g     naloxone (NARCAN) injection 0.1-0.4 mg     senna-docusate (SENOKOT-S;PERICOLACE) 8.6-50 MG per tablet 1-2 tablet     polyethylene glycol (MIRALAX/GLYCOLAX) Packet 17 g       ALLERGIES:  Allergies   Allergen Reactions     Lisinopril      Penicillins        PHYSICAL EXAM:  Temp: 98.8  F (37.1  C) Temp src: Oral BP: 183/85 Pulse: 94 Heart Rate: 71 Resp: 20 SpO2: 98 % O2 Device: None (Room air)    Vital Signs with Ranges  Temp:  [97.1  F (36.2  C)-98.8  F (37.1  C)] 98.8  F (37.1  C)  Pulse:  [94] 94  Heart Rate:  [68-77] 71  Resp:  [20] 20  BP: (126-183)/(61-85) 183/85  SpO2:  [94 %-98 %] 98 %  168 lbs 4.8 oz    Constitutional: no distress  Eyes: anicteric  Respiratory: clear  Cardiovascular: irr, S1S2, no edema  GI:  nondistended, nontender, bowel sounds present  Lymph/Hematologic: no lymphadenopathy  Skin: dry, no rash  Musculoskeletal: good muscle tone  Neurologic: no focal deficits, alert, not fully oriented       Tele:  Afib/aflutter rates controlled. Occasional PVCs.     Echo 5/24/17: EF 60%, mild RVE with low-normal function, mod-sev TR, RVSP 45mmHg, mild AI      ASSESSMENT:  85-year-old male with history of at least moderate dementia, history of urinary retention and had a supra pubic catheter. This became dislodged which is why he presented to the emergency department for this issue.  He was found to be in a junctional rhythm with heart rate around 40, blood pressure was 160/100, there was no evidence of hypo-perfusion.  There is no reported lightheadedness, dizziness, or syncope. Cardiology consulted.   He had been on Toprol 200 mg b.i.d. since at least 2014, held since admission on May 22. 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 Started on amiodarone. No significant pauses since admission.      RECOMMENDATIONS:  1.  Afib/aflutter with RVR and junctional rhythm c/w tachy/kelly syndrome -  Was on high dose Toprol XL which has been discontinued. Using amiodarone for rate control, seems to have reasonable rate control without causing significant bradycardia. Plan 400mg daily x 1 week, then 200 mg daily thereafter. The patient is not a good AC candidate.  Note Palliative Care team saw again 5/30/17. Guardian would like to talk to patient's children/family about comfort care/hospice. If he does go to comfort care/hospice, would continue with amiodarone for rate control. If not going to comfort care and family wants to be aggressive, we can discuss possible PPM implantation (though not a good candidate).     No changes at present. Please call us back when a decision has been made.     Daron Cardoso PA-C

## 2017-05-31 NOTE — PROGRESS NOTES
Discharge Planner   Discharge Plans in progress: Pt will return to [facility] with [facility].  Barriers to discharge plan: None         Entered by: GISELA Courtney 05/31/2017 2:54 PM

## 2017-05-31 NOTE — PROGRESS NOTES
SWS;  D: discharge planning  A/P: SW notified that pt has been made comfort care and will return to [facility] tomorrow. SW notified that guardian would like to use [facility]. SW contacted [facility] and faxed referral information. [facility] plans to contact pt guardian to arrange admission meeting. SW also notified [facility] of pt return tomorrow. Pt will need transport. SW will continue to follow.
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
## 2017-05-31 NOTE — PLAN OF CARE
Problem: Goal Outcome Summary  Goal: Goal Outcome Summary  Outcome: No Change  Patient has dementia, total care. Up to chair for 2 hours via lift, lift to commode, had small BM. Diet advance to pureed, at 75%. Palliative conference, pt is nowcomfort cares.

## 2017-06-01 VITALS
HEART RATE: 94 BPM | BODY MASS INDEX: 27.16 KG/M2 | WEIGHT: 168.3 LBS | OXYGEN SATURATION: 96 % | TEMPERATURE: 97.1 F | DIASTOLIC BLOOD PRESSURE: 72 MMHG | SYSTOLIC BLOOD PRESSURE: 168 MMHG | RESPIRATION RATE: 20 BRPM

## 2017-06-01 LAB — INTERPRETATION ECG - MUSE: NORMAL

## 2017-06-01 PROCEDURE — 25000132 ZZH RX MED GY IP 250 OP 250 PS 637: Performed by: INTERNAL MEDICINE

## 2017-06-01 PROCEDURE — 25000132 ZZH RX MED GY IP 250 OP 250 PS 637

## 2017-06-01 PROCEDURE — 99239 HOSP IP/OBS DSCHRG MGMT >30: CPT | Performed by: INTERNAL MEDICINE

## 2017-06-01 PROCEDURE — 25000132 ZZH RX MED GY IP 250 OP 250 PS 637: Performed by: PHYSICIAN ASSISTANT

## 2017-06-01 PROCEDURE — 99233 SBSQ HOSP IP/OBS HIGH 50: CPT | Performed by: CLINICAL NURSE SPECIALIST

## 2017-06-01 PROCEDURE — 25000132 ZZH RX MED GY IP 250 OP 250 PS 637: Performed by: HOSPITALIST

## 2017-06-01 PROCEDURE — 25000132 ZZH RX MED GY IP 250 OP 250 PS 637: Performed by: CLINICAL NURSE SPECIALIST

## 2017-06-01 RX ORDER — AMIODARONE HYDROCHLORIDE 200 MG/1
200 TABLET ORAL DAILY
Qty: 30 TABLET | Refills: 0 | DISCHARGE
Start: 2017-06-05 | End: 2017-06-01

## 2017-06-01 RX ORDER — AMOXICILLIN 250 MG
2 CAPSULE ORAL DAILY
DISCHARGE
Start: 2017-06-01

## 2017-06-01 RX ORDER — LORAZEPAM 2 MG/ML
.25-.5 CONCENTRATE ORAL 2 TIMES DAILY
Qty: 30 ML | Refills: 0 | Status: ON HOLD | OUTPATIENT
Start: 2017-06-01 | End: 2018-09-27

## 2017-06-01 RX ORDER — LORAZEPAM 2 MG/ML
0.25 CONCENTRATE ORAL EVERY 8 HOURS PRN
Refills: 0 | Status: ON HOLD | DISCHARGE
Start: 2017-06-01 | End: 2018-09-27

## 2017-06-01 RX ORDER — ACETAMINOPHEN 500 MG
1000 TABLET ORAL 3 TIMES DAILY
Qty: 1 BOTTLE | Refills: 0 | DISCHARGE
Start: 2017-06-01

## 2017-06-01 RX ORDER — HYDRALAZINE HYDROCHLORIDE 25 MG/1
25 TABLET, FILM COATED ORAL EVERY 8 HOURS
Qty: 90 TABLET | Refills: 0 | Status: ON HOLD | DISCHARGE
Start: 2017-06-01 | End: 2018-09-27

## 2017-06-01 RX ORDER — OXYCODONE HYDROCHLORIDE 5 MG/1
2.5 TABLET ORAL 4 TIMES DAILY
Qty: 5 TABLET | Refills: 0 | Status: ON HOLD | OUTPATIENT
Start: 2017-06-01 | End: 2018-09-28

## 2017-06-01 RX ORDER — CALCIUM CARBONATE 500 MG/1
1 TABLET, CHEWABLE ORAL 2 TIMES DAILY PRN
DISCHARGE
Start: 2017-06-01

## 2017-06-01 RX ORDER — BISACODYL 10 MG
10 SUPPOSITORY, RECTAL RECTAL DAILY PRN
Qty: 30 SUPPOSITORY | Status: ON HOLD | DISCHARGE
Start: 2017-06-01 | End: 2018-09-27

## 2017-06-01 RX ORDER — AMLODIPINE BESYLATE 10 MG/1
10 TABLET ORAL DAILY
Qty: 60 TABLET | DISCHARGE
Start: 2017-06-01

## 2017-06-01 RX ORDER — AMIODARONE HYDROCHLORIDE 200 MG/1
200 TABLET ORAL DAILY
Qty: 30 TABLET | Refills: 0 | Status: ON HOLD | DISCHARGE
Start: 2017-06-01 | End: 2018-09-27

## 2017-06-01 RX ORDER — POLYETHYLENE GLYCOL 3350 17 G/17G
17 POWDER, FOR SOLUTION ORAL DAILY
Qty: 7 PACKET | DISCHARGE
Start: 2017-06-01

## 2017-06-01 RX ADMIN — ACETAMINOPHEN 1000 MG: 160 SUSPENSION ORAL at 08:19

## 2017-06-01 RX ADMIN — AMLODIPINE BESYLATE 10 MG: 10 TABLET ORAL at 08:19

## 2017-06-01 RX ADMIN — ISOSORBIDE DINITRATE 10 MG: 10 TABLET ORAL at 08:19

## 2017-06-01 RX ADMIN — POLYETHYLENE GLYCOL 3350 17 G: 17 POWDER, FOR SOLUTION ORAL at 08:19

## 2017-06-01 RX ADMIN — OXYCODONE HYDROCHLORIDE 2.5 MG: 5 TABLET ORAL at 12:53

## 2017-06-01 RX ADMIN — SENNOSIDES AND DOCUSATE SODIUM 1 TABLET: 8.6; 5 TABLET ORAL at 08:20

## 2017-06-01 RX ADMIN — LORAZEPAM 0.5 MG: 2 CONCENTRATE ORAL at 08:23

## 2017-06-01 RX ADMIN — PANTOPRAZOLE SODIUM 20 MG: 40 TABLET, DELAYED RELEASE ORAL at 12:49

## 2017-06-01 RX ADMIN — AMIODARONE HYDROCHLORIDE 400 MG: 200 TABLET ORAL at 08:20

## 2017-06-01 RX ADMIN — OXYCODONE HYDROCHLORIDE 2.5 MG: 5 TABLET ORAL at 08:20

## 2017-06-01 NOTE — PLAN OF CARE
Problem: Goal Outcome Summary  Goal: Goal Outcome Summary  Outcome: No Change  A& Ox2.  Pleasant and cooperative.  VSS, 96% sat on RA.  Denies pain at present.  Up to chair with assist 2/lift for dinner. Requires staff to feed.  Ortiz patent.  Up to BSC per request, no BM, passing flatus.  LUE edema, elevated on pillows, T Pump for comfort.  Plan for discharge to [facility] with hospice services tomorrow. Continue with plan of care.

## 2017-06-01 NOTE — DISCHARGE INSTRUCTIONS
Pt will admit to [facility] [place].     May apply heat therapy to L antecubital and R wrist painful IV sites prn.  See also order for voltaren gel topically to same areas.

## 2017-06-01 NOTE — PLAN OF CARE
Problem: Goal Outcome Summary  Goal: Goal Outcome Summary  Outcome: No Change  Patient up to chair with lift. Patient reclines in recliner with support of pillows as patient unable to support himself enough to sit on his own. Scheduled pains given for wrist and general pain. Appetite fair. Needs to be fed due to no motor strength.

## 2017-06-01 NOTE — PLAN OF CARE
Problem: Goal Outcome Summary  Goal: Goal Outcome Summary  Outcome: No Change  Patient resting comfortably this shift. Denies pain/discomfort. Alert to self only. Large incontinent bowel movement. Plan for comfort care. Discharge to [facility] with hospice. Tele is A. Fib CVR.

## 2017-06-01 NOTE — PROGRESS NOTES
SWS:  D: discharge planning   A/P: [facility].  [facility] contacted and they are able to transport at 1:00pm. [facility] updated and orders faxed.     ADDENDUM- LEYDA notified that pt unable to sit in a wc for transport. LEYDA contacted [facility] and stretcher transport arranged and able to transport around 1:30pm.

## 2017-06-01 NOTE — DISCHARGE SUMMARY
Physician Discharge Summary     Name: Edil Lopez    MRN: [id number]     YOB: 1931    Age: 85 year old                                                 Primary care provider: Shirley Toscano      Admit date:  5/22/2017      Discharge date and time: 6/1/2017       Discharge Physician:  Stanislaw Franco        Discharge Diagnosis:       #1.  Tachybradycardia syndrome seen by cardiologist admission was treated with amiodarone drip which was changed to oral amiodarone therapy.    #2.  Urinary tract infection with Escherichia coli and treated and completed treatment here in the hospital    #3.  Dysphagia    #4.  Urinary retention    #5.  Electrolyte abnormalities including hyponatremia and hypokalemia    #6.  Dementia, chronic pain syndrome, dysphagia, requiring total care: Patient's guardian contacted and meeting was  held and the guardian recommended that patient will go on comfort measures and hospice consultation on his long-term facility.  Patient's medications were continued and can be given if patient is able to take it.  Overall goal is comfort measures focusing on quality of life.        Past Medical History and comorbid conditions:     Past Medical History:   Diagnosis Date     Anemia      BPH (benign prostatic hyperplasia)      Chronic kidney disease      CVA (cerebral infarction)      Dementia      Gastro-oesophageal reflux disease      Hyperparathyroidism (H)      Hypertension      Hypothyroidism      Neuromuscular disorder (H)      PVD (peripheral vascular disease) (H)        Past Surgical History:  Past Surgical History:   Procedure Laterality Date     APPENDECTOMY OPEN       ENDARTERECTOMY CAROTID       HERNIA REPAIR       Suprapubic catheter placement                     Brief Summary of Hospital stay :       Patient is a 85-year-old male with multiple medical problems with baseline dementia who was admitted with problems with his Ortiz catheter placement.  Patient developed junctional  bradycardia into the 30s and cardiologist consulted and patient was started with amiodarone drip and this was changed to oral amiodarone.  Patient has multiple issues including urinary tract infection dysphagia acute kidney injury and given the patient's poor prognosis family is contacted and patient's guardian decided the patient needs comfortable measures with focus on quality of life measures rather than prolonging life or restorative care.  On the day of discharge, patient is comfortable, alert and was  discharged to long-term care with anticipation of hospice care at the facility.  If patient's needs urgent care, discomfort addressed at emergency department level but since patient is that patient is not to be admitted to the hospital and requires hospice to follow.              Consultations during hospital stay       TLC         Major procedure performed/  Significant Diagnostic Studies             Results for orders placed or performed during the hospital encounter of 05/22/17   Chest  XR, 1 view PORTABLE    Narrative    XR CHEST PORT 1 VW 5/22/2017 8:46 AM    COMPARISON: 6/2/2011    HISTORY: Bradycardia      Impression    IMPRESSION: Mildly enlarged cardiac silhouette. No focal airspace  disease, pleural effusion or pneumothorax.    [facility] RIGGS   Abd/pelvis CT no contrast - Stone Protocol    Narrative    CT ABDOMEN AND PELVIS WITHOUT CONTRAST   5/22/2017 9:43 AM     HISTORY: Unable to urinate. Evaluate for stones.    TECHNIQUE: Volumetric helical sections were acquired from the lung  bases through the ischial tuberosities without IV contrast. Coronal  images were also reconstructed. Radiation dose for this scan was  reduced using automated exposure control, adjustment of the mA and/or  kV according to patient size, or iterative reconstruction technique.    COMPARISON: None.    FINDINGS: There is a large nonobstructing bladder stone posteriorly,  measuring 3.9 x 2.1 cm. A few smaller adjacent bladder stones  are also  noted. Small amount of air within the urinary bladder may be related  to recent instrumentation, although a colovesical fistula cannot be  excluded. There is mild prostatic enlargement and central  calcification. Nonobstructing 1.2 cm stone in the lower pole of the  right kidney. Cyst in the lower pole of the left kidney measures 2.2  cm. No hydronephrosis.    No bowel obstruction. Colonic diverticulosis. Mild fat stranding  adjacent to the proximal sigmoid colon could represent acute  diverticulitis. No associated fluid collections are identified to  suggest abscess. There is a small area of extraluminal air extending  from the rectosigmoid region along the superior aspect of the prostate  gland (series 3 image 90; series 2 image 70). No free fluid in the  pelvis. Moderate atherosclerotic aortoiliac calcification. Ectasia of  the infrarenal abdominal aorta measures up to 2.8 cm AP. The liver,  gallbladder, spleen, adrenal glands, and pancreas have unremarkable  noncontrast appearances. Cardiac enlargement. Coronary artery  calcification. Mild fibrotic changes about the periphery of both lung  bases. Indeterminate 0.8 cm pulmonary nodule at the left lung base  posteriorly (series 2 image 17). Degenerative changes are noted  throughout the visualized thoracolumbar spine and in both hips.      Impression    IMPRESSION:   1. Large nonobstructing bladder stone measures 3.9 cm, with a few  smaller bladder stones also noted.  2. Nonobstructing 1.2 cm stone in the lower pole of the right kidney.  3. Mild bowel wall thickening and fat stranding involving the proximal  sigmoid colon could represent a mild diverticulitis. Please clinically  correlate.  4. Small amount of extraluminal air extends anteriorly from the  rectosigmoid region along the anterior aspect of the prostate gland.  There is also a small amount of air within the urinary bladder. A  colovesical fistula in this location cannot be excluded.  Clinical  correlation is again requested.  5. Indeterminate 0.8 cm pulmonary nodule at the left lung base  posteriorly.    MARGARITO LYONS MD         Recent Labs  Lab 05/31/17  0618 05/26/17  0455   WBC 12.0* 12.0*   HGB 9.2*  --    HCT 29.7*  --    MCV 94  --      --      No results for input(s): CULT in the last 168 hours.    Recent Labs  Lab 05/31/17  0618 05/29/17  0654 05/28/17  0620 05/27/17  0656     --  146*  --  150*   POTASSIUM 4.1 4.2 3.5  < > 3.1*   CHLORIDE 112*  --  118*  --  123*   CO2 21  --  20  --  18*   ANIONGAP 8  --  8  --  9   GLC 89  --  117*  --  92   BUN 13  --  19  --  28   CR 0.96  --  0.88  --  0.99   GFRESTIMATED 74  --  82  --  72   GFRESTBLACK 89  --  >90African American GFR Calc  --  87   JENNA 9.9  --  9.5  --  9.8   < > = values in this interval not displayed.      Recent Labs  Lab 05/31/17  0618 05/28/17  0620 05/27/17  0656 05/26/17  0455   GLC 89 117* 92 112*           No results for input(s): INR in the last 168 hours.        No results for input(s): TROPONIN, TROPI, TROPR in the last 168 hours.    Invalid input(s): TROP, TROPONINIES              Pending Results           Unresulted Labs Ordered in the Past 30 Days of this Admission     No orders found from 3/23/2017 to 5/23/2017.              Disposition         long term care facility      Allergies       Allergies   Allergen Reactions     Lisinopril      Penicillins             Patient Instructions and Discharge Medications              Review of your medicines      START taking       Dose / Directions    amiodarone 200 MG tablet   Commonly known as:  PACERONE/CODARONE        Dose:  200 mg   Take 1 tablet (200 mg) by mouth daily   Quantity:  30 tablet   Refills:  0       amLODIPine 10 MG tablet   Commonly known as:  NORVASC        Dose:  10 mg   Take 1 tablet (10 mg) by mouth daily   Quantity:  60 tablet   Refills:  0       bisacodyl 10 MG Suppository   Commonly known as:  DULCOLAX   Used for:  Other chronic pain         Dose:  10 mg   Place 1 suppository (10 mg) rectally daily as needed for constipation   Quantity:  30 suppository   Refills:  0       calcium carbonate 500 MG chewable tablet   Commonly known as:  TUMS   Used for:  Gastroesophageal reflux disease, esophagitis presence not specified        Dose:  1 chew tab   Take 1 tablet (500 mg) by mouth 2 times daily as needed for heartburn   Refills:  0       diclofenac 1 % Gel topical gel   Commonly known as:  VOLTAREN   Used for:  Other chronic pain        Apply  2 grams to R antecubital and L wrist old IV sites four times daily using enclosed dosing card.   Quantity:  100 g   Refills:  0       hydrALAZINE 25 MG tablet   Commonly known as:  APRESOLINE   Used for:  Other chronic pain        Dose:  25 mg   Take 1 tablet (25 mg) by mouth every 8 hours   Quantity:  90 tablet   Refills:  0       pantoprazole Susp suspension   Commonly known as:  PROTONIX   Used for:  Gastroesophageal reflux disease, esophagitis presence not specified        Dose:  40 mg   Take 20 mLs (40 mg) by mouth every morning   Refills:  0         CONTINUE these medicines which may have CHANGED, or have new prescriptions. If we are uncertain of the size of tablets/capsules you have at home, strength may be listed as something that might have changed.       Dose / Directions    acetaminophen 500 MG tablet   Commonly known as:  TYLENOL   This may have changed:    - medication strength  - additional instructions   Used for:  Other chronic pain        Dose:  1000 mg   Take 2 tablets (1,000 mg) by mouth 3 times daily 0800, 1400, 2200   Quantity:  1 Bottle   Refills:  0       * LORazepam 2 MG/ML (HIGH CONC) solution   Commonly known as:  LORazepam INTENSOL   This may have changed:    - how much to take  - additional instructions   Used for:  Agitation        Dose:  0.25-0.5 mg   Take 0.13-0.25 mLs (0.26-0.5 mg) by mouth 2 times daily See also prn dosing   Quantity:  30 mL   Refills:  0       * LORazepam 2  MG/ML (HIGH CONC) solution   Commonly known as:  ATIVAN   This may have changed:    - how much to take  - when to take this  - reasons to take this  - additional instructions   Used for:  Agitation        Dose:  0.25 mg   Take 0.13 mLs (0.26 mg) by mouth every 8 hours as needed See also scheduled doses. Hold for sedation.   Refills:  0       oxyCODONE 5 MG IR tablet   Commonly known as:  ROXICODONE   This may have changed:    - medication strength  - how much to take  - additional instructions  - Another medication with the same name was removed. Continue taking this medication, and follow the directions you see here.   Used for:  Other chronic pain        Dose:  2.5 mg   Take 0.5 tablets (2.5 mg) by mouth 4 times daily Hold for sedation   Quantity:  5 tablet   Refills:  0       polyethylene glycol Packet   Commonly known as:  MIRALAX/GLYCOLAX   This may have changed:  additional instructions   Used for:  Drug-induced constipation        Dose:  17 g   Take 17 g by mouth daily Hold for loose stools   Quantity:  7 packet   Refills:  0       senna-docusate 8.6-50 MG per tablet   Commonly known as:  SENOKOT-S;PERICOLACE   This may have changed:  additional instructions   Used for:  Drug-induced constipation        Dose:  2 tablet   Take 2 tablets by mouth daily Hold for loose stools   Refills:  0       * Notice:  This list has 2 medication(s) that are the same as other medications prescribed for you. Read the directions carefully, and ask your doctor or other care provider to review them with you.      CONTINUE these medicines which have NOT CHANGED       Dose / Directions    magnesium hydroxide 400 MG/5ML suspension   Commonly known as:  MILK OF MAGNESIA   Used for:  Drug-induced constipation        Dose:  30 mL   Take 30 mLs by mouth daily as needed for constipation or heartburn   Quantity:  105 mL   Refills:  0         STOP taking          METOPROLOL SUCCINATE ER PO           RANITIDINE HCL PO           sennosides  8.6 MG tablet   Commonly known as:  SENOKOT                Where to get your medicines      Some of these will need a paper prescription and others can be bought over the counter. Ask your nurse if you have questions.     Bring a paper prescription for each of these medications      LORazepam 2 MG/ML (HIGH CONC) solution     oxyCODONE 5 MG IR tablet       You don't need a prescription for these medications      acetaminophen 500 MG tablet     amiodarone 200 MG tablet     amLODIPine 10 MG tablet     bisacodyl 10 MG Suppository     calcium carbonate 500 MG chewable tablet     diclofenac 1 % Gel topical gel     hydrALAZINE 25 MG tablet     magnesium hydroxide 400 MG/5ML suspension     pantoprazole Susp suspension     polyethylene glycol Packet     senna-docusate 8.6-50 MG per tablet         Information about where to get these medications is not yet available     ! Ask your nurse or doctor about these medications      LORazepam 2 MG/ML (HIGH CONC) solution              Discharge diet:  Active Diet Order      Combination Diet Dysphagia Diet Level 1: Pureed; Nectar Thickened Liquids (pre-thickened or use instant food thickener)      Advance Diet as Tolerated          Discharge activity:Activity as tolerated        Discharge follow-up:    Follow up with primary care provider in 7 days or earlier if symptoms return or gets worse.    Follow up with consultant as instructed     Other instructions:    We discussed with Patient/family about detail discharge instructions as well as discharge medications above including potential risks,side effects and benefits.Patient/family understood benefits and potential serious side effects of taking these medications and need to follow up with PCP if the patient develops complications.  Patient is also advised to see a doctor immediately for severe symptoms.        I saw and evaluated the patient today and I also reviewed the discharge instructions and answered all the patient  questions.Over 30 minutes spend on discharge and coordination of discharge process for this patient.          Disclaimer: This note consists of symbols derived from keyboarding, dictation and/or voice recognition software. As a result, there may be errors in the script that have gone undetected. Please consider this when interpreting information found in this lynnette

## 2017-06-01 NOTE — PROGRESS NOTES
Discharge instructions given to patient. Patient unable to sign. IV removed. Transport arrived to take patient to [facility]. Ortiz in place.

## 2017-06-01 NOTE — PROGRESS NOTES
[facility]  Palliative Care Progress Note  Text Page           Assessment & Plan   I was asked to see the patient for goals of care.     Recommendations:  1. Pain - tylenol 975mg q 8 hours. Decrease Oxycodone scheduled (from PTA) to 2.5 mg 4 times daily and hold for sedation . Reposition for comfort.  Also with painful, erythematous old IV sites L antecubital and R inner wrist. Discussed with Dr. Franco. Will order voltaren gel and heat therapy prn for discharge.  2. Agitation - Decrease Ativan to 0.25mg BID. Prn ativan 0.25mg q 8 hours prn. DC seroquel. Promote food and fluids. Appreciate nursing assistance to keep pt awake during daytime.  following.  3. Constipation - miralax daily scheduled, and miralax daily prn, senna-docusate 1-2 tabs po BID, dulcolax suppository prn.      Goal of Care: DNR/DNI.  Conference with Dr. Franco and guardian Filemon Calvert 5/31/17. Pt is now comfort care. Will continue amiodarone orally at discharge for pt's comfort. Pt to be assessed for hospice when discharged back to [facility]. Filemon would like it arranged for [facility] to have this done at [facility] after discharge. Emergency number for guardian if issues arise is [phone] Raulito Flores.     Disease Process/es & Symptoms:  Edil Lopez is a 85 year old patient admitted for rutledge catheter replacement requiring urology consult to replace in ED. Pt was also noted with symptoms of bradycardia. He has been treated for junctional rhythm, dysphagia, UTI, urinary retention, ARF with CKD and HTN.   Pt has a hx of SP catheter for BPH and urinary retention, but had been converted to rutledge catheter. His catheter had fallen out approximately 2 weeks ago and was successfully replaced by NH staff, and was examined by Zelda BARRIGA from [facility] in [place].      This is in the setting of dementia, hx of CVA, s/p carotid endarterectomy, HTN, CKD, GERD, PVD, Hypothyroidism, Hyperparathyroidism.  "Guardian reports that pt has had a 9# weight loss over 6 month period. He had been assessed for HOSPICE appropriateness at NH, however did not meet criteria. Guardian believes pt is comfort care at [facility] per POLST.       He has been hospitalized in the ED for rutledge catheter or SP catheter problems and UTI  2 times in the past 9 months. In Pt's clinical record, his weight is increased 20# since 5/2016.     The following symptoms are noted as concerning to pt quality of life - per nursing staff, providers and guardian.  Pain in low abdomin  Agitation     Psychosocial/Spiritual Needs:     Oriented guardian to Spiritual Health and Social Work Services as part of Palliative Care team.  is following.     Decision-Making & Goals of Care:  Discussion/counseling today about goals of care/decisions:   6/1/2017 Pt to discharge today back to LTC with comfort care status with [facility] to assess after discharge for hospice support and follow up with pt's shahid Filemon. Orders for symptom management completed for discharge. Pt informed. \"I knew I was going somewhere\". Discussed with [facility] who is arranging transport. Shahid Colbert is aware of transfer today.  5/31/2017 Updated pt's shahid Colbert this am by phone. Conference with Filemon and Dr. Franco at 1pm. 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 Recommendation for comfort care approach. Pt is not ambulatory, incontinent of bowel, unable to sit up independently. Dr. Franco will review med list prior to discharge, and keep medications that will promote pt comfort including amiodarone and pt will take until he is not able to. Filemon is agreeable to this plan. POL Updated. Filemon has the original copy. Pt to be assessed for appropriateness for Hospice by [facility] after return to [facility].  5/30/2017 Message left with shahid Colbert. Message left for lisa Geronimo to follow pt on 6/1/17. Discussed with [facility] regarding discharge plan with possible assessment by [facility] after return back to [facility].  5/26/2017 Met with pt's shahid Calvert and Dr. Man.  Reviewed pt's current status with heart rhythm issues, UTI with underlying dementia. Risks and benefits of various medical and procedural interventions in pt with dementia explained. Guardian shares that pt's son, dtr and niece are not involved with pt, do not visit. Pt does not interact when taken to special events at [facility]. Pt is always in bed, often sleeping, incont of bowel. No pictures on the wall, no apparent belgica or pleasures. Pt does speak. Is total care for feeding, bathing and other ADLS. He does not walk. He does not sit up independently. Filemon is comfortable with a hospice, palliative approach but would like to talk with pt's children before making decision for comfort care as he knows pt will stop his heart medication. Filemon requests for [facility] to assist with [facility] assessment for Hospice support for pt at discharge back to [facility]. Fileomn states not to take pt to ICU, or offer pressors. IV or oral medications to help with heart rhythm are ok. Filemon understands that pt will be discharged back to [facility] when MDs feel that his UTI is improving and he is stable with his heart rhythm and blood pressure. POLST from 10/10/2014 is still accurate and [facility] has a copy of this.  5/25/17 Per SWS, pt has a guardian. [facility] has been attempting to obtain contact information and documents for much of the day.  Was contacted by shahid Calvert at 3:30 pm today. Per guardian, Pt and his wife were both residents of [facility]. Wife passed away 6 months ago in Hospice. [facility] at [facility] had recommended hospice 9-10 months ago for pt, however, he did not meet criteria at that time.   Guardian states pt's baseline currently is that he is verbal, but forgetful and no short term memory. Pt is in bed when guardian sees him although [facility] staff state he gets up to a WC. Pt is incontinent of bowel. Has indwelling catheter. Requires help for cares and is fed. Natalie believes that pt is comfort care per his most recent POLST. Have requested a copy. Guardian works M-F, and only is available until 1pm on Friday. Have requested a care conference with Natalie, [facility], Dr. Man for 11:30am to review most recent POLST, pt condition, and establish POC. If pt now meets HOSPICE criteria, guardian reports that [facility] is the provider that would be selected.     Patient has decision-making capacity Unreliable  Patient has a court-appointed guardian/conservator for healthcare decisions in a legal document . See name(s) and contact information in Health Care Agent/Legal Guardian section below.     Name: Filemon Calvert, Relationship: guardian, Phone(s): [phone]. Emergency number for weekends and holidays [phone].  Appeciate [facility] help obtaining legal guardian documents.        Patient has a completed health care directive available in the chart (Y/N): N  Physician orders for life-sustaining treatment (POLST) form is on file dated 10/10/14.  Have confirmed that this is the most updated POLST with mayonoelle Braswelln.  Code Status:                     Do not resusitate / Do not intubate     Oxana BARRIGA, CNS  Pain Management and Palliative Care  [facility]  Pgr: [phone]    Time Spent on this Encounter   I spent 10 minutes in assessment of the patient and discussion with the patient. Another 25 minutes in review of chart, documentation and discussion with the health care team.  Care conference 1:00-1:30pm.    Interval History   Pt sitting up in chair. Eating with diet restrictions. Had BMS. Chronic abdominal pain appears controlled. Pt complaints that his wrist hurts. He  has inflammed, reddened swollen areas L antecubital and R inner wrist from old IV sites.Pt does not have pain with joint movement. Ortiz draining ada urine.     Review of Systems    Pain - nurses feel controlled with current pain regimen. See above regarding wrist and antecubital area.   Agitation - Controlled with ativan and activity   Dysphagia - not currently able to take in enough nutrition to meet his caloric needs.on thicked liquid diet and sitting upright. Is fed by nursing.      Palliative Symptom Review (0=no symptom/no concern, 1=mild, 2=moderate, 3=severe):        Pain - L antecubital and R innter wrist.   Agitation - calm at present.        Physical Exam   Temp:  [96.4  F (35.8  C)-97.1  F (36.2  C)] 97.1  F (36.2  C)  Heart Rate:  [] 76  Resp:  [20] 20  BP: (119-170)/(67-83) 170/83  SpO2:  [96 %-98 %] 97 %  168 lbs 4.8 oz  GEN:  Alert. Oriented to self. Smiling, talking.   HEENT:  Normocephalic/atraumatic, no scleral icterus, no nasal discharge, mouth moist.  CV:  Occas irreg beat, 100, S1, S2; .  +3 DP/PT pulses bilatererally; 1+ edema BLE.  RESP:  Diminished bilat anteriorly.  Symmetric chest rise on inhalation noted.  Normal respiratory effort.  ABD:  Rounded, soft, sl tender with palpation non-focal/non-distended.  +BS + BMs  EXT:  Edema & pulses as noted above.  Pale.  M/S:   Painful L inner antecubital and R inner wrist as noted above.  SKIN:  Dry to touch, scattered bruises on arms. See above.  NEURO: Symmetric strength +5/5.   Psych:  Calm. Smiles.     Medications        amiodarone  400 mg Oral Daily     [START ON 6/5/2017] amiodarone  200 mg Oral Daily     acetaminophen  1,000 mg Oral TID     pantoprazole  40 mg Oral QAM     hydrALAZINE  25 mg Oral Q8H LOLY     isosorbide dinitrate  10 mg Oral TID     oxyCODONE (ROXICODONE) IR half-tab 2.5 mg  2.5 mg Oral 4x Daily     LORazepam  0.26-0.5 mg Oral BID     sodium chloride (PF)  3 mL Intracatheter Q8H     amLODIPine  10 mg Oral Daily      senna-docusate  1-2 tablet Oral BID     polyethylene glycol  17 g Oral Daily       Data   No results found for this or any previous visit (from the past 24 hour(s)).

## 2017-06-01 NOTE — PROGRESS NOTES
Patient seen and examined by me this morning.  He is stable this morning up in chair.  Care plan was discussed with palliative care team and plan for comfort measures discussed.  Patient to continue medications  if he is able to take it.  Hospice team will see patient at care facility for further review his medication or the need to continue chronic medications.

## 2017-06-01 NOTE — PLAN OF CARE
Problem: Goal Outcome Summary  Goal: Goal Outcome Summary  SLP: Per chart review, pt now comfort cares and d/c on hospice. Recommend MD liberalize diet as appropriate. Will complete ST orders.      Speech Language Therapy Discharge Summary     Reason for therapy discharge:    No further expectations of functional progress.     Progress towards therapy goal(s). See goals on Care Plan in [facility] electronic health record for goal details.  Goals met     Therapy recommendation(s):    No further therapy is recommended.

## 2017-09-14 ENCOUNTER — DOCUMENTATION ONLY (OUTPATIENT)
Dept: OTHER | Facility: CLINIC | Age: 82
End: 2017-09-14

## 2017-10-20 ENCOUNTER — HOSPITAL ENCOUNTER (EMERGENCY)
Facility: CLINIC | Age: 82
Discharge: HOME | End: 2017-10-20
Attending: EMERGENCY MEDICINE | Admitting: EMERGENCY MEDICINE
Payer: COMMERCIAL

## 2017-10-20 ENCOUNTER — APPOINTMENT (OUTPATIENT)
Dept: CT IMAGING | Facility: CLINIC | Age: 82
End: 2017-10-20
Attending: EMERGENCY MEDICINE
Payer: COMMERCIAL

## 2017-10-20 VITALS
TEMPERATURE: 98 F | HEART RATE: 72 BPM | RESPIRATION RATE: 20 BRPM | DIASTOLIC BLOOD PRESSURE: 66 MMHG | SYSTOLIC BLOOD PRESSURE: 137 MMHG | OXYGEN SATURATION: 96 %

## 2017-10-20 DIAGNOSIS — Z46.6 URINARY CATHETER (FOLEY) CHANGE REQUIRED: ICD-10-CM

## 2017-10-20 DIAGNOSIS — R30.0 DYSURIA: ICD-10-CM

## 2017-10-20 LAB
ALBUMIN UR-MCNC: 100 MG/DL
ANION GAP SERPL CALCULATED.3IONS-SCNC: 5 MMOL/L (ref 3–14)
APPEARANCE UR: ABNORMAL
BACTERIA #/AREA URNS HPF: ABNORMAL /HPF
BASOPHILS # BLD AUTO: 0 10E9/L (ref 0–0.2)
BASOPHILS NFR BLD AUTO: 0.1 %
BILIRUB UR QL STRIP: NEGATIVE
BUN SERPL-MCNC: 23 MG/DL (ref 7–30)
CALCIUM SERPL-MCNC: 11 MG/DL (ref 8.5–10.1)
CHLORIDE SERPL-SCNC: 107 MMOL/L (ref 94–109)
CO2 SERPL-SCNC: 25 MMOL/L (ref 20–32)
COLOR UR AUTO: YELLOW
CREAT SERPL-MCNC: 1.71 MG/DL (ref 0.66–1.25)
DIFFERENTIAL METHOD BLD: ABNORMAL
EOSINOPHIL # BLD AUTO: 0 10E9/L (ref 0–0.7)
EOSINOPHIL NFR BLD AUTO: 0 %
ERYTHROCYTE [DISTWIDTH] IN BLOOD BY AUTOMATED COUNT: 15.2 % (ref 10–15)
GFR SERPL CREATININE-BSD FRML MDRD: 38 ML/MIN/1.7M2
GLUCOSE SERPL-MCNC: 132 MG/DL (ref 70–99)
GLUCOSE UR STRIP-MCNC: NEGATIVE MG/DL
HCT VFR BLD AUTO: 33.4 % (ref 40–53)
HGB BLD-MCNC: 10.6 G/DL (ref 13.3–17.7)
HGB UR QL STRIP: ABNORMAL
IMM GRANULOCYTES # BLD: 0 10E9/L (ref 0–0.4)
IMM GRANULOCYTES NFR BLD: 0.4 %
KETONES UR STRIP-MCNC: NEGATIVE MG/DL
LEUKOCYTE ESTERASE UR QL STRIP: ABNORMAL
LYMPHOCYTES # BLD AUTO: 0.8 10E9/L (ref 0.8–5.3)
LYMPHOCYTES NFR BLD AUTO: 7.3 %
MCH RBC QN AUTO: 30.1 PG (ref 26.5–33)
MCHC RBC AUTO-ENTMCNC: 31.7 G/DL (ref 31.5–36.5)
MCV RBC AUTO: 95 FL (ref 78–100)
MONOCYTES # BLD AUTO: 0.7 10E9/L (ref 0–1.3)
MONOCYTES NFR BLD AUTO: 5.8 %
MUCOUS THREADS #/AREA URNS LPF: PRESENT /LPF
NEUTROPHILS # BLD AUTO: 9.8 10E9/L (ref 1.6–8.3)
NEUTROPHILS NFR BLD AUTO: 86.4 %
NITRATE UR QL: NEGATIVE
NRBC # BLD AUTO: 0 10*3/UL
NRBC BLD AUTO-RTO: 0 /100
PH UR STRIP: 7 PH (ref 5–7)
PLATELET # BLD AUTO: 323 10E9/L (ref 150–450)
POTASSIUM SERPL-SCNC: 4.5 MMOL/L (ref 3.4–5.3)
RBC # BLD AUTO: 3.52 10E12/L (ref 4.4–5.9)
RBC #/AREA URNS AUTO: >182 /HPF (ref 0–2)
SODIUM SERPL-SCNC: 137 MMOL/L (ref 133–144)
SOURCE: ABNORMAL
SP GR UR STRIP: 1.01 (ref 1–1.03)
UROBILINOGEN UR STRIP-MCNC: 0 MG/DL (ref 0–2)
WBC # BLD AUTO: 11.3 10E9/L (ref 4–11)
WBC #/AREA URNS AUTO: 168 /HPF (ref 0–2)
WBC CLUMPS #/AREA URNS HPF: PRESENT /HPF

## 2017-10-20 PROCEDURE — 80048 BASIC METABOLIC PNL TOTAL CA: CPT | Performed by: EMERGENCY MEDICINE

## 2017-10-20 PROCEDURE — 99284 EMERGENCY DEPT VISIT MOD MDM: CPT | Mod: 25

## 2017-10-20 PROCEDURE — 81001 URINALYSIS AUTO W/SCOPE: CPT | Performed by: EMERGENCY MEDICINE

## 2017-10-20 PROCEDURE — 36415 COLL VENOUS BLD VENIPUNCTURE: CPT | Performed by: EMERGENCY MEDICINE

## 2017-10-20 PROCEDURE — 87088 URINE BACTERIA CULTURE: CPT | Performed by: EMERGENCY MEDICINE

## 2017-10-20 PROCEDURE — 25000128 H RX IP 250 OP 636: Performed by: EMERGENCY MEDICINE

## 2017-10-20 PROCEDURE — 87086 URINE CULTURE/COLONY COUNT: CPT | Performed by: EMERGENCY MEDICINE

## 2017-10-20 PROCEDURE — 85025 COMPLETE CBC W/AUTO DIFF WBC: CPT | Performed by: EMERGENCY MEDICINE

## 2017-10-20 PROCEDURE — 96365 THER/PROPH/DIAG IV INF INIT: CPT | Mod: 59

## 2017-10-20 PROCEDURE — 51702 INSERT TEMP BLADDER CATH: CPT

## 2017-10-20 PROCEDURE — 87186 SC STD MICRODIL/AGAR DIL: CPT | Performed by: EMERGENCY MEDICINE

## 2017-10-20 PROCEDURE — 74176 CT ABD & PELVIS W/O CONTRAST: CPT

## 2017-10-20 RX ORDER — CEFTRIAXONE 1 G/1
1 INJECTION, POWDER, FOR SOLUTION INTRAMUSCULAR; INTRAVENOUS ONCE
Status: COMPLETED | OUTPATIENT
Start: 2017-10-20 | End: 2017-10-20

## 2017-10-20 RX ORDER — CEPHALEXIN 500 MG/1
500 CAPSULE ORAL 4 TIMES DAILY
Qty: 20 CAPSULE | Refills: 0 | Status: SHIPPED | OUTPATIENT
Start: 2017-10-20 | End: 2017-10-25

## 2017-10-20 RX ORDER — HYDROMORPHONE HCL/0.9% NACL/PF 0.2MG/0.2
0.2 SYRINGE (ML) INTRAVENOUS
Status: DISCONTINUED | OUTPATIENT
Start: 2017-10-20 | End: 2017-10-20 | Stop reason: HOSPADM

## 2017-10-20 RX ADMIN — Medication 0.2 MG: at 10:13

## 2017-10-20 RX ADMIN — CEFTRIAXONE SODIUM 1 G: 1 INJECTION, POWDER, FOR SOLUTION INTRAMUSCULAR; INTRAVENOUS at 14:31

## 2017-10-20 ASSESSMENT — ENCOUNTER SYMPTOMS: ABDOMINAL PAIN: 1

## 2017-10-20 NOTE — ED NOTES
Attempt to irrigate rutledge catheter done.  Catheter flushes, but does not provide urine return.  Patient in pain with attempts to irrigate. MD notified.

## 2017-10-20 NOTE — ED AVS SNAPSHOT
"    [facility] EMERGENCY DEPARTMENT: [phone]                                              INTERAGENCY TRANSFER FORM - PHYSICIAN ORDERS   10/20/2017                    Hospital Admission Date: 10/20/2017  KEELY GUILLEN   : 1931  Sex: Male        Attending Provider: Buffy Rod MD     Allergies:  Lisinopril, Penicillins    Infection:  None   Service:  EMERGENCY ME    Ht:  1.676 m (5' 6\")   Wt:  --   Admission Wt:  --    BMI:  --   BSA:  --            Patient PCP Information     Provider PCP Type    Shirley Toscano MD General      ED Clinical Impression     Diagnosis Description Comment Added By Time Added    Dysuria [R30.0] Dysuria [R30.0]  Buffy Rod MD 10/20/2017  3:40 PM    Urinary catheter (Ortiz) change required [Z46.6] Urinary catheter (Ortiz) change required [Z46.6]  Buffy Rod MD 10/20/2017  3:40 PM      Hospital Problems as of 10/20/2017     None      Non-Hospital Problems as of 10/20/2017              Priority Class Noted    ACP (advance care planning) Medium  2014    Bradycardia Medium  2017    Renal failure Medium  2017      Code Status History     Date Active Date Inactive Code Status Order ID Comments User Context    2017 10:16 AM  DNR/DNI [id number]  Stanislaw Franco MD Outpatient    2017  2:04 PM 2017 10:16 AM DNR/DNI [id number]  Oxana Garza APRN CNS Inpatient    2017  6:16 PM 2017  2:04 PM Special Code [id number] Parameters:  DNR/DNI. No ICU. No pressors. IV and Oral meds to help with pt cardiac rhythm ok. See palliative care note 17 Oxana Garza APRN CNS Inpatient    2017  6:15 PM 2017  6:16 PM Special Code [id number] Parameters:  DNR/DNI. No ICU. No pressors. IV and Oral meds to help with pt cardiac rhythm ok. Oxana Garza APRN CNS Inpatient    2017 12:10 PM 2017  6:15 PM DNR/DNI [id number]  Kat Quiroga, SHERWIN Inpatient         Medication Review      UNREVIEWED " medications. Ask your doctor about these medications        Dose / Directions Comments    acetaminophen 500 MG tablet   Commonly known as:  TYLENOL   Used for:  Other chronic pain        Dose:  1000 mg   Take 2 tablets (1,000 mg) by mouth 3 times daily 0800, 1400, 2200   Quantity:  1 Bottle   Refills:  0        amiodarone 200 MG tablet   Commonly known as:  PACERONE/CODARONE   Used for:  Bradycardia        Dose:  200 mg   Take 1 tablet (200 mg) by mouth daily   Quantity:  30 tablet   Refills:  0        amLODIPine 10 MG tablet   Commonly known as:  NORVASC   Used for:  Bradycardia        Dose:  10 mg   Take 1 tablet (10 mg) by mouth daily   Quantity:  60 tablet   Refills:  0        bisacodyl 10 MG Suppository   Commonly known as:  DULCOLAX   Used for:  Other chronic pain        Dose:  10 mg   Place 1 suppository (10 mg) rectally daily as needed for constipation   Quantity:  30 suppository   Refills:  0        calcium carbonate 500 MG chewable tablet   Commonly known as:  TUMS   Used for:  Gastroesophageal reflux disease, esophagitis presence not specified        Dose:  1 chew tab   Take 1 tablet (500 mg) by mouth 2 times daily as needed for heartburn   Refills:  0        diclofenac 1 % Gel topical gel   Commonly known as:  VOLTAREN   Used for:  Other chronic pain        Apply  2 grams to R antecubital and L wrist old IV sites four times daily using enclosed dosing card.   Quantity:  100 g   Refills:  0        hydrALAZINE 25 MG tablet   Commonly known as:  APRESOLINE   Used for:  Other chronic pain        Dose:  25 mg   Take 1 tablet (25 mg) by mouth every 8 hours   Quantity:  90 tablet   Refills:  0    HTN       * LORazepam 2 MG/ML (HIGH CONC) solution   Commonly known as:  LORazepam INTENSOL   Used for:  Agitation        Dose:  0.25-0.5 mg   Take 0.13-0.25 mLs (0.26-0.5 mg) by mouth 2 times daily See also prn dosing   Quantity:  30 mL   Refills:  0        * LORazepam 2 MG/ML (HIGH CONC) solution   Commonly known as:   ATIVAN   Used for:  Agitation        Dose:  0.25 mg   Take 0.13 mLs (0.26 mg) by mouth every 8 hours as needed See also scheduled doses. Hold for sedation.   Refills:  0        magnesium hydroxide 400 MG/5ML suspension   Commonly known as:  MILK OF MAGNESIA   Used for:  Drug-induced constipation        Dose:  30 mL   Take 30 mLs by mouth daily as needed for constipation or heartburn   Quantity:  105 mL   Refills:  0        oxyCODONE 5 MG IR tablet   Commonly known as:  ROXICODONE   Used for:  Other chronic pain        Dose:  2.5 mg   Take 0.5 tablets (2.5 mg) by mouth 4 times daily Hold for sedation   Quantity:  5 tablet   Refills:  0    Hold for sedation       pantoprazole Susp suspension   Commonly known as:  PROTONIX   Used for:  Gastroesophageal reflux disease, esophagitis presence not specified        Dose:  40 mg   Take 20 mLs (40 mg) by mouth every morning   Refills:  0        polyethylene glycol Packet   Commonly known as:  MIRALAX/GLYCOLAX   Used for:  Drug-induced constipation        Dose:  17 g   Take 17 g by mouth daily Hold for loose stools   Quantity:  7 packet   Refills:  0        senna-docusate 8.6-50 MG per tablet   Commonly known as:  SENOKOT-S;PERICOLACE   Used for:  Drug-induced constipation        Dose:  2 tablet   Take 2 tablets by mouth daily Hold for loose stools   Refills:  0        * Notice:  This list has 2 medication(s) that are the same as other medications prescribed for you. Read the directions carefully, and ask your doctor or other care provider to review them with you.      START taking        Dose / Directions Comments    cephALEXin 500 MG capsule   Commonly known as:  KEFLEX        Dose:  500 mg   Take 1 capsule (500 mg) by mouth 4 times daily for 5 days   Quantity:  20 capsule   Refills:  0                  Further instructions from your care team       Discharge Instructions  Urinary Tract Infection  You have urinary tract infection, or UTI. The urinary tract includes the  kidneys (which make urine), ureters (the tubes that carry urine from the kidneys to the bladder), the bladder (which stores urine), and urethra (the tube that carries urine out of the bladder).  Urinary tract infections occur when bacteria travel up the urethra into the bladder. We suspect a UTI based on chemical and microscopic findings in your urine, but if there is a question about your findings, we will do a culture to see if bacteria grow. A urine culture takes several days. You should always follow-up with your primary physician to find out about results of your culture if one was done.   Return to the Emergency Department if:    You have severe back pain.    You are vomiting so that you can t take your medicine, or have signs of dehydration (such as urinating less than 3 times per day).    You have fever over 101.5 degrees F.    You have significant confusion or are very weak, or feel very ill.    Your child seems much more ill, won t wake up, won t respond right, or is crying for a long time and won t calm down.    Your child is showing signs of dehydration, Signs of dehydration can be:  o Your infant has had no wet diapers in 4-5 hours.  o Your older child has not passed urine in 6-8 hours.  o Your infant or child starts to have dry mouth and lips, or no saliva or tears.    Follow-up with your doctor:     Children under 24 months need to be seen by their regular doctor within one week after a diagnosis of a UTI. It may be necessary to do some imaging tests to look at the child s kidney or bladder.    You should begin to feel better within 24 - 48 hours of starting your antibiotic.  If you do not, you need to be seen again.      Treatment:     You will be treated with an antibiotic to kill the bacteria. We have to make an educated guess as to which antibiotic will work for your infection. In most healthy people, we can guess right almost all of the time. Sometimes a culture is done to show which antibiotics  "will work. This usually takes 2-3 days. When the culture is done, we may have to contact you to put you on a different antibiotic.    Take a pain medication such as Tylenol  (acetaminophen), Advil  (ibuprofen), Nuprin  (ibuprofen), or Aleve  (naproxen). If you have been given a narcotic such as Vicodin  (hydrocodone with acetaminophen), Percocet  (oxycodone with acetaminophen), or codeine, do not drive for four hours after you have taken it. If the narcotic contains Tylenol  (acetaminophen), do not take Tylenol  with it. All narcotics will cause constipation, so eat a high fiber diet.      Pyridium  (phenazopyridine) or Uristat  (phenazopyridine) is a prescription medication that numbs the bladder to reduce the burning pain of some UTIs.  The same medication is available in a non-prescription version called Azo-Standard  (phenazopyridine), Urodol  (phenazopyridine), or other brand names. This medication will change the color of the urine and tears (usually blue or orange). If you wear contacts, do not wear them while taking this medication as they may be stained by the medication.    Antibiotic Warning:     If you have been placed on antibiotics - watch for signs of allergic reaction.  These include rash, lip swelling, difficulty breathing, wheezing, and dizziness.  If you develop any of these symptoms, stop the antibiotic immediately and go to an emergency room or urgent care for evaluation.    Probiotics: If you have been given an antibiotic, you may want to also take a probiotic pill or eat yogurt with live cultures. Probiotics have \"good bacteria\" to help your intestines stay healthy. Studies have shown that probiotics help prevent diarrhea and other intestine problems (including C. diff infection) when you take antibiotics. You can buy these without a prescription in the pharmacy section of the store.   If you were given a prescription for medicine here today, be sure to read all of the information (including " the package insert) that comes with your prescription.  This will include important information about the medicine, its side effects, and any warnings that you need to know about.  The pharmacist who fills the prescription can provide more information and answer questions you may have about the medicine.  If you have questions or concerns that the pharmacist cannot address, please call or return to the Emergency Department.

## 2017-10-20 NOTE — ED AVS SNAPSHOT
[facility] Emergency Department    [address]    [facility] [address]    Phone:  [phone]    Fax:  [phone]                                       Edil Lopez   MRN: [id number]    Department:  [facility] Emergency Department   Date of Visit:  10/20/2017           Patient Information     Date Of Birth          7/21/1931        Your diagnoses for this visit were:     Dysuria     Urinary catheter (Ortiz) change required        You were seen by Buffy Rod MD.      Follow-up Information     Follow up with Shirley Toscano MD In 3 days.    Specialty:  Family Practice    Contact information:    [facility]  [address] [place] [id number] [phone]          Discharge Instructions       Discharge Instructions  Urinary Tract Infection  You have urinary tract infection, or UTI. The urinary tract includes the kidneys (which make urine), ureters (the tubes that carry urine from the kidneys to the bladder), the bladder (which stores urine), and urethra (the tube that carries urine out of the bladder).  Urinary tract infections occur when bacteria travel up the urethra into the bladder. We suspect a UTI based on chemical and microscopic findings in your urine, but if there is a question about your findings, we will do a culture to see if bacteria grow. A urine culture takes several days. You should always follow-up with your primary physician to find out about results of your culture if one was done.   Return to the Emergency Department if:    You have severe back pain.    You are vomiting so that you can t take your medicine, or have signs of dehydration (such as urinating less than 3 times per day).    You have fever over 101.5 degrees F.    You have significant confusion or are very weak, or feel very ill.    Your child seems much more ill, won t wake up, won t respond right, or is crying for a long time and won t calm down.    Your child is showing signs  of dehydration, Signs of dehydration can be:  o Your infant has had no wet diapers in 4-5 hours.  o Your older child has not passed urine in 6-8 hours.  o Your infant or child starts to have dry mouth and lips, or no saliva or tears.    Follow-up with your doctor:     Children under 24 months need to be seen by their regular doctor within one week after a diagnosis of a UTI. It may be necessary to do some imaging tests to look at the child s kidney or bladder.    You should begin to feel better within 24 - 48 hours of starting your antibiotic.  If you do not, you need to be seen again.      Treatment:     You will be treated with an antibiotic to kill the bacteria. We have to make an educated guess as to which antibiotic will work for your infection. In most healthy people, we can guess right almost all of the time. Sometimes a culture is done to show which antibiotics will work. This usually takes 2-3 days. When the culture is done, we may have to contact you to put you on a different antibiotic.    Take a pain medication such as Tylenol  (acetaminophen), Advil  (ibuprofen), Nuprin  (ibuprofen), or Aleve  (naproxen). If you have been given a narcotic such as Vicodin  (hydrocodone with acetaminophen), Percocet  (oxycodone with acetaminophen), or codeine, do not drive for four hours after you have taken it. If the narcotic contains Tylenol  (acetaminophen), do not take Tylenol  with it. All narcotics will cause constipation, so eat a high fiber diet.      Pyridium  (phenazopyridine) or Uristat  (phenazopyridine) is a prescription medication that numbs the bladder to reduce the burning pain of some UTIs.  The same medication is available in a non-prescription version called Azo-Standard  (phenazopyridine), Urodol  (phenazopyridine), or other brand names. This medication will change the color of the urine and tears (usually blue or orange). If you wear contacts, do not wear them while taking this medication as they may  "be stained by the medication.    Antibiotic Warning:     If you have been placed on antibiotics - watch for signs of allergic reaction.  These include rash, lip swelling, difficulty breathing, wheezing, and dizziness.  If you develop any of these symptoms, stop the antibiotic immediately and go to an emergency room or urgent care for evaluation.    Probiotics: If you have been given an antibiotic, you may want to also take a probiotic pill or eat yogurt with live cultures. Probiotics have \"good bacteria\" to help your intestines stay healthy. Studies have shown that probiotics help prevent diarrhea and other intestine problems (including C. diff infection) when you take antibiotics. You can buy these without a prescription in the pharmacy section of the store.   If you were given a prescription for medicine here today, be sure to read all of the information (including the package insert) that comes with your prescription.  This will include important information about the medicine, its side effects, and any warnings that you need to know about.  The pharmacist who fills the prescription can provide more information and answer questions you may have about the medicine.  If you have questions or concerns that the pharmacist cannot address, please call or return to the Emergency Department.       24 Hour Appointment Hotline       To make an appointment at any [facility], call [phone] ([phone]). If you don't have a family doctor or clinic, we will help you find one. [facility] clinics are conveniently located to serve the needs of you and your family.             Review of your medicines      START taking        Dose / Directions Last dose taken    cephALEXin 500 MG capsule   Commonly known as:  KEFLEX   Dose:  500 mg   Quantity:  20 capsule        Take 1 capsule (500 mg) by mouth 4 times daily for 5 days   Refills:  0          Our records show that you are taking the medicines listed below. If these are " incorrect, please call your family doctor or clinic.        Dose / Directions Last dose taken    acetaminophen 500 MG tablet   Commonly known as:  TYLENOL   Dose:  1000 mg   Quantity:  1 Bottle        Take 2 tablets (1,000 mg) by mouth 3 times daily 0800, 1400, 2200   Refills:  0        amiodarone 200 MG tablet   Commonly known as:  PACERONE/CODARONE   Dose:  200 mg   Quantity:  30 tablet        Take 1 tablet (200 mg) by mouth daily   Refills:  0        amLODIPine 10 MG tablet   Commonly known as:  NORVASC   Dose:  10 mg   Quantity:  60 tablet        Take 1 tablet (10 mg) by mouth daily   Refills:  0        bisacodyl 10 MG Suppository   Commonly known as:  DULCOLAX   Dose:  10 mg   Quantity:  30 suppository        Place 1 suppository (10 mg) rectally daily as needed for constipation   Refills:  0        calcium carbonate 500 MG chewable tablet   Commonly known as:  TUMS   Dose:  1 chew tab        Take 1 tablet (500 mg) by mouth 2 times daily as needed for heartburn   Refills:  0        diclofenac 1 % Gel topical gel   Commonly known as:  VOLTAREN   Quantity:  100 g        Apply  2 grams to R antecubital and L wrist old IV sites four times daily using enclosed dosing card.   Refills:  0        hydrALAZINE 25 MG tablet   Commonly known as:  APRESOLINE   Dose:  25 mg   Quantity:  90 tablet        Take 1 tablet (25 mg) by mouth every 8 hours   Refills:  0        * LORazepam 2 MG/ML (HIGH CONC) solution   Commonly known as:  LORazepam INTENSOL   Dose:  0.25-0.5 mg   Quantity:  30 mL        Take 0.13-0.25 mLs (0.26-0.5 mg) by mouth 2 times daily See also prn dosing   Refills:  0        * LORazepam 2 MG/ML (HIGH CONC) solution   Commonly known as:  ATIVAN   Dose:  0.25 mg        Take 0.13 mLs (0.26 mg) by mouth every 8 hours as needed See also scheduled doses. Hold for sedation.   Refills:  0        magnesium hydroxide 400 MG/5ML suspension   Commonly known as:  MILK OF MAGNESIA   Dose:  30 mL   Quantity:  105 mL         Take 30 mLs by mouth daily as needed for constipation or heartburn   Refills:  0        oxyCODONE 5 MG IR tablet   Commonly known as:  ROXICODONE   Dose:  2.5 mg   Quantity:  5 tablet        Take 0.5 tablets (2.5 mg) by mouth 4 times daily Hold for sedation   Refills:  0        pantoprazole Susp suspension   Commonly known as:  PROTONIX   Dose:  40 mg        Take 20 mLs (40 mg) by mouth every morning   Refills:  0        polyethylene glycol Packet   Commonly known as:  MIRALAX/GLYCOLAX   Dose:  17 g   Quantity:  7 packet        Take 17 g by mouth daily Hold for loose stools   Refills:  0        senna-docusate 8.6-50 MG per tablet   Commonly known as:  SENOKOT-S;PERICOLACE   Dose:  2 tablet        Take 2 tablets by mouth daily Hold for loose stools   Refills:  0        * Notice:  This list has 2 medication(s) that are the same as other medications prescribed for you. Read the directions carefully, and ask your doctor or other care provider to review them with you.            Prescriptions were sent or printed at these locations (1 Prescription)                   Other Prescriptions                Printed at Department/Unit printer (1 of 1)         cephALEXin (KEFLEX) 500 MG capsule                Procedures and tests performed during your visit     Abd/pelvis CT no contrast - [facility] Protocol    Basic metabolic panel    CBC with platelets differential    UA with Microscopic reflex to Culture    Urine Culture Aerobic Bacterial      Orders Needing Specimen Collection     None      Pending Results     Date and Time Order Name Status Description    10/20/2017 1411 Abd/pelvis CT no contrast - [facility] Protocol Preliminary     10/20/2017 1050 Urine Culture Aerobic Bacterial Preliminary             Pending Culture Results     Date and Time Order Name Status Description    10/20/2017 1050 Urine Culture Aerobic Bacterial Preliminary             Pending Results Instructions     If you had any lab results that were not finalized at  the time of your Discharge, you can call the ED Lab Result RN at [phone]. You will be contacted by this team for any positive Lab results or changes in treatment. The nurses are available 7 days a week from 10A to 6:30P.  You can leave a message 24 hours per day and they will return your call.        Test Results From Your Hospital Stay        10/20/2017 11:15 AM      Component Results     Component Value Ref Range & Units Status    Color Urine Yellow  Final    Appearance Urine Slightly Cloudy  Final    Glucose Urine Negative NEG^Negative mg/dL Final    Bilirubin Urine Negative NEG^Negative Final    Ketones Urine Negative NEG^Negative mg/dL Final    Specific Gravity Urine 1.006 1.003 - 1.035 Final    Blood Urine Large (A) NEG^Negative Final    pH Urine 7.0 5.0 - 7.0 pH Final    Protein Albumin Urine 100 (A) NEG^Negative mg/dL Final    Urobilinogen mg/dL 0.0 0.0 - 2.0 mg/dL Final    Nitrite Urine Negative NEG^Negative Final    Leukocyte Esterase Urine Large (A) NEG^Negative Final    Source Catheterized Urine  Final    WBC Urine 168 (H) 0 - 2 /HPF Final    RBC Urine >182 (H) 0 - 2 /HPF Final    WBC Clumps Present (A) NEG^Negative /HPF Final    Bacteria Urine Few (A) NEG^Negative /HPF Final    Mucous Urine Present (A) NEG^Negative /LPF Final         10/20/2017 11:35 AM      Component Results     Component Value Ref Range & Units Status    Sodium 137 133 - 144 mmol/L Final    Potassium 4.5 3.4 - 5.3 mmol/L Final    Chloride 107 94 - 109 mmol/L Final    Carbon Dioxide 25 20 - 32 mmol/L Final    Anion Gap 5 3 - 14 mmol/L Final    Glucose 132 (H) 70 - 99 mg/dL Final    Urea Nitrogen 23 7 - 30 mg/dL Final    Creatinine 1.71 (H) 0.66 - 1.25 mg/dL Final    GFR Estimate 38 (L) >60 mL/min/1.7m2 Final    Non  GFR Calc    GFR Estimate If Black 46 (L) >60 mL/min/1.7m2 Final    African American GFR Calc    Calcium 11.0 (H) 8.5 - 10.1 mg/dL Final         10/20/2017 11:22 AM      Component Results     Component  Value Ref Range & Units Status    WBC 11.3 (H) 4.0 - 11.0 10e9/L Final    RBC Count 3.52 (L) 4.4 - 5.9 10e12/L Final    Hemoglobin 10.6 (L) 13.3 - 17.7 g/dL Final    Hematocrit 33.4 (L) 40.0 - 53.0 % Final    MCV 95 78 - 100 fl Final    MCH 30.1 26.5 - 33.0 pg Final    MCHC 31.7 31.5 - 36.5 g/dL Final    RDW 15.2 (H) 10.0 - 15.0 % Final    Platelet Count 323 150 - 450 10e9/L Final    Diff Method Automated Method  Final    % Neutrophils 86.4 % Final    % Lymphocytes 7.3 % Final    % Monocytes 5.8 % Final    % Eosinophils 0.0 % Final    % Basophils 0.1 % Final    % Immature Granulocytes 0.4 % Final    Nucleated RBCs 0 0 /100 Final    Absolute Neutrophil 9.8 (H) 1.6 - 8.3 10e9/L Final    Absolute Lymphocytes 0.8 0.8 - 5.3 10e9/L Final    Absolute Monocytes 0.7 0.0 - 1.3 10e9/L Final    Absolute Eosinophils 0.0 0.0 - 0.7 10e9/L Final    Absolute Basophils 0.0 0.0 - 0.2 10e9/L Final    Abs Immature Granulocytes 0.0 0 - 0.4 10e9/L Final    Absolute Nucleated RBC 0.0  Final         10/20/2017  1:01 PM      Component Results     Component    Specimen Description    Catheterized Urine    Special Requests    Specimen received in preservative    Culture Micro    PENDING         10/20/2017  3:26 PM      Narrative     CT ABDOMEN AND PELVIS WITHOUT CONTRAST   10/20/2017 3:11 PM     HISTORY: DWAIN, evaluate for obstruction.    TECHNIQUE: Noncontrast CT images of the abdomen and pelvis. Radiation  dose for this scan was reduced using automated exposure control,  adjustment of the mA and/or kV according to patient size, or iterative  reconstruction technique.    COMPARISON: 5/22/2017    FINDINGS: There is a nonobstructing calculus at the lower pole of the  right kidney that measures up to 16 mm. There is a large bladder  calculus that measures up to 4.1 cm. There is no hydronephrosis. There  is mild prominence of the right ureter. The urinary bladder is  decompressed by a Ortiz catheter. Exophytic cyst arising from the left  kidney is  unchanged. There is another unchanged cyst at the upper pole  of the left kidney.    Within the limits of an unenhanced examination, the liver,  gallbladder, spleen, pancreas, and adrenal glands are unremarkable.  Small hiatal hernia is present. No abdominal or retroperitoneal  adenopathy. No free air or ascites. No pelvic adenopathy. There is  sigmoid diverticulosis with some thickening of the proximal sigmoid  colon. No significant surrounding infiltration of the fat.        Impression     IMPRESSION:  1. No hydronephrosis. There is mild prominence of the right ureter  which may be related to timing of imaging and peristalsis or early  ureteral obstruction. There is no hydroureter on the left. There is a  nonobstructing calculus in the right renal collecting system and  another large bladder calculus which are unchanged from the prior  study.  2. Ortiz catheter present in the urinary bladder.  3. Sigmoid diverticulosis with thickening of the proximal sigmoid  colon. This thickening may be a chronic finding or related to  developing diverticulitis. Neoplasm cannot be excluded. This is  similar to the prior study.                Clinical Quality Measure: Blood Pressure Screening     Your blood pressure was checked while you were in the emergency department today. The last reading we obtained was  BP: (!) 180/98 . Please read the guidelines below about what these numbers mean and what you should do about them.  If your systolic blood pressure (the top number) is less than 120 and your diastolic blood pressure (the bottom number) is less than 80, then your blood pressure is normal. There is nothing more that you need to do about it.  If your systolic blood pressure (the top number) is 120-139 or your diastolic blood pressure (the bottom number) is 80-89, your blood pressure may be higher than it should be. You should have your blood pressure rechecked within a year by a primary care provider.  If your systolic blood  "pressure (the top number) is 140 or greater or your diastolic blood pressure (the bottom number) is 90 or greater, you may have high blood pressure. High blood pressure is treatable, but if left untreated over time it can put you at risk for heart attack, stroke, or kidney failure. You should have your blood pressure rechecked by a primary care provider within the next 4 weeks.  If your provider in the emergency department today gave you specific instructions to follow-up with your doctor or provider even sooner than that, you should follow that instruction and not wait for up to 4 weeks for your follow-up visit.        Thank you for choosing [facility]       Thank you for choosing [facility] for your care. Our goal is always to provide you with excellent care. Hearing back from our patients is one way we can continue to improve our services. Please take a few minutes to complete the written survey that you may receive in the mail after you visit with us. Thank you!        [employer] Information     [employer] lets you send messages to your doctor, view your test results, renew your prescriptions, schedule appointments and more. To sign up, go to [email or web address] . Click on \"Log in\" on the left side of the screen, which will take you to the Welcome page. Then click on \"Sign up Now\" on the right side of the page.     You will be asked to enter the access code listed below, as well as some personal information. Please follow the directions to create your username and password.     Your access code is: [email or web address]  Expires: 2018  3:44 PM     Your access code will  in 90 days. If you need help or a new code, please call your [facility] clinic or [phone].        Care EveryWhere ID     This is your Care EveryWhere ID. This could be used by other organizations to access your [facility] medical records  [id number]        Equal Access to Services     WILLIAM ACHARYA : ida Jensen, " arianna renae ah. So [facility] [phone].    ATENCIÓN: Si habla yaniraañol, tiene a jeffery disposición servicios gratuitos de asistencia lingüística. Llame al [phone].    We comply with applicable federal civil rights laws and [place] laws. We do not discriminate on the basis of race, color, national origin, age, disability, sex, sexual orientation, or gender identity.            After Visit Summary       This is your record. Keep this with you and show to your community pharmacist(s) and doctor(s) at your next visit.

## 2017-10-20 NOTE — ED NOTES
Patient is eating lunch, he has water and spite to drink.   He is awaiting CT.  Denies other needs.  Update given to MALINDA Chirinos from [facility] ([phone]).

## 2017-10-20 NOTE — ED NOTES
Bed: ED23  Expected date: 10/20/17  Expected time: 8:52 AM  Means of arrival: Ambulance  Comments:  mitchell 593- blocked catheter

## 2017-10-20 NOTE — ED NOTES
Report given to Herb nurse at [facility].  Voicemail with update left for patient's son, Lee.

## 2017-10-20 NOTE — ED PROVIDER NOTES
"  History     Chief Complaint:  Catheter Problem    HPI     Patient history limited secondary to dementia.    Edil Lopez is a 86 year old male with a history of dementia and chronic kidney disease who has graduated from hospice care who presents to the emergency department today by ambulance for evaluation of catheter problem. The patient states that his catheter has been functioning \"on and off\" since his catheter was last replaced \"in July/August last year.\" There has been presence of pink colored urine in his catheter tube and the patient complains of low abdominal pain. The patient states that his catheter has not been draining well since yesterday.    Allergies:  Lisinopril  Penicillins    Medications:    oxyCODONE (ROXICODONE) 5 MG IR tablet  acetaminophen (TYLENOL) 500 MG tablet  LORazepam (ATIVAN) 2 MG/ML (HIGH CONC) solution  bisacodyl (DULCOLAX) 10 MG Suppository  magnesium hydroxide (MILK OF MAGNESIA) 400 MG/5ML suspension  polyethylene glycol (MIRALAX/GLYCOLAX) Packet  senna-docusate (SENOKOT-S;PERICOLACE) 8.6-50 MG per tablet  pantoprazole (PROTONIX) SUSP suspension  diclofenac (VOLTAREN) 1 % GEL topical gel  amLODIPine (NORVASC) 10 MG tablet  hydrALAZINE (APRESOLINE) 25 MG tablet  amiodarone (PACERONE/CODARONE) 200 MG tablet    Past Medical History:    Anemia   BPH (benign prostatic hyperplasia)   Chronic kidney disease   CVA (cerebral infarction)   Dementia   Gastro-oesophageal reflux disease   Hyperparathyroidism (H)   Hypertension   Hypothyroidism   Neuromuscular disorder (H)   PVD (peripheral vascular disease) (H)     Past Surgical History:    APPENDECTOMY OPEN   ENDARTERECTOMY CAROTID   HERNIA REPAIR   Suprapubic catheter placement    Family History:    Family history reviewed. No pertinent family history.     Social History:  Smoking Status: Former Smoker  Alcohol Use: Negative   Marital Status:       Review of Systems   Unable to perform ROS: Dementia   Gastrointestinal: Positive for " abdominal pain.     Physical Exam     Patient Vitals for the past 24 hrs:   BP Temp Temp src Pulse Heart Rate Resp SpO2   10/20/17 1551 (!) 140/114 98  F (36.7  C) Oral - 70 19 96 %   10/20/17 1433 (!) 180/98 - - - - - -   10/20/17 1241 148/88 - - - - - -   10/20/17 1044 146/75 - - - - - -   10/20/17 0908 (!) 182/118 98.6  F (37  C) Oral 72 - 20 97 %       Physical Exam  General: Patient is alert and interactive when I enter the room  Head:  The scalp, face, and head appear normal  Eyes:  Conjunctivae are normal  ENT:    The nose is normal    Pinnae are normal    External acoustic canals are normal  Neck:  Trachea midline  CV:  Pulses are normal  Resp:  No respiratory distress   Abdomen:      Soft, tenderness in the supra-pubic region, mildly distended  Musc:  Normal muscular tone    No major joint effusions    No asymmetric leg swelling    Good capillary refill noted  Skin:  No rash or lesions noted  Neuro:  Speech is normal and fluent. Face is symmetric.     Moving all extremities well.   Psych: Awake. Alert.  Normal affect.  Appropriate interactions.  : ventral erosion of meatus, urine coming around rutledge, mild amount of blood around meatus and glans, no active bleeding, tender to touch     Emergency Department Course     Imaging:  Radiology findings were communicated with the patient who voiced understanding of the findings.    Abd/pelvis CT no contrast - Stone Protocol  IMPRESSION:  1. No hydronephrosis. There is mild prominence of the right ureter  which may be related to timing of imaging and peristalsis or early  ureteral obstruction. There is no hydroureter on the left. There is a  nonobstructing calculus in the right renal collecting system and  another large bladder calculus which are unchanged from the prior  study.  2. Rutledge catheter present in the urinary bladder.  3. Sigmoid diverticulosis with thickening of the proximal sigmoid  colon. This thickening may be a chronic finding or related  to  developing diverticulitis. Neoplasm cannot be excluded. This is  similar to the prior study.  Reading per radiology     Laboratory:  Laboratory findings were communicated with the patient who voiced understanding of the findings.    UA with Microscopic reflex to Culture: Blood Large (A), Leukocyte Esterase Large (A), WBC Clumps Present (A), Bacteria Few (A), Protein Albumin 100 (A), WBC/ (H), RBC/HPF >182 (H), Mucous Present (A)       CBC: WBC 11.3 (H), HGB 10.6 (L),   BMP: Glucose (Collected 1110) 132 (H), GFR Estimate 38 (L), GFR Estimate if Black 46 (L), Calcium 11.0 (H)  o/w WNL (Creatinine 1.71 (H))  Urine culture aerobic bacterial: Pending    Interventions:  1013 Dilaudid 0.2 mg IV  1431 Rocephin 1 g IV    Emergency Department Course:    Nursing notes and vitals reviewed.    0910 I performed an exam of the patient as documented above.     IV was inserted and blood was drawn for laboratory testing, results above.    The patient provided a urine sample here in the emergency department. This was sent for laboratory testing, findings above.    The patient was sent for a Abd/pelvis CT while in the emergency department, results above.     0950 I rechecked the patient.    1030 I rechecked the patient after a catheter was successfully placed in the patient by MALINDA Osorio.    I discussed the treatment plan with the patient. They expressed understanding of this plan and consented to discharge. They will be discharged home with instructions for care and follow up. In addition, the patient will return to the emergency department if their symptoms persist, worsen, if new symptoms arise or if there is any concern.  All questions were answered.    Impression & Plan      Medical Decision Making:  Edil Lopez is a 86 year old male who presents to the emergency department today for evaluation of a catheter problem. Vitals are unremarkable and exam reveals a blocked catheter with mild amount of blood around the  ureteral meatus. Ultrasound of his bladder reveals 800 cc's consistent with urinary obstruction of unclear etiology. The differential for him included UTI, hematuria, sediment obstruction problem, nephrolithiasis, and others. We were able to remove the old catheter and placed a coude into his bladder without too much difficulty. We did irrigate urine out until clear and no longer bloody. We obtained a sample which was possibly infected, this is sent for culture. He does have an elevated creatinine, it is unclear if this is from prolonged obstruction but with no signs of hydronephrosis or obstruction on CT stone protocol. He does not have an elevation of his BUN so seems less likely to be prerenal. I did give him Ceftriaxone and some fluids here. I will send him home on a course of Keflex for possible infection as his previous UA showed cephalosporin sensitive E.coli infection. Patient was much more comfortable with this new rutledge and had no other issues to report so I am comfortable with discharge home. The patient was discharged with close return precautions. He had no signs of sepsis to warrant further workup or admission. He will follow-up with PCP and urology for following creatinine.     Diagnosis:    ICD-10-CM    1. Dysuria R30.0    2. Urinary catheter (Rutledge) change required Z46.6      Disposition:   The patient is discharged to home.    Discharge Medications:  New Prescriptions    CEPHALEXIN (KEFLEX) 500 MG CAPSULE    Take 1 capsule (500 mg) by mouth 4 times daily for 5 days       Scribe Disclosure:  Tanvi MARISCAL, am serving as a scribe at 9:09 AM on 10/20/2017 to document services personally performed by Buffy Rod MD, based on my observations and the provider's statements to me.      [facility] EMERGENCY DEPARTMENT       Buffy Rod MD  10/20/17 3815

## 2017-10-20 NOTE — ED NOTES
Patient brought to ER by Ambulance for evaluation of possible blocked catheter.  Small amount of pink colored urine in catheter tube, patient complains of low abd pain and says his catheter has not been draining well since yesterday.  ABCs intact.      Bladder scan on arrival to ER shows 850-1000ml.

## 2017-10-20 NOTE — ED AVS SNAPSHOT
[facility] Emergency Department    [address]    [facility] [address]    Phone:  [phone]    Fax:  [phone]                                       Edil Lopez   MRN: [id number]    Department:  [facility] Emergency Department   Date of Visit:  10/20/2017           After Visit Summary Signature Page     I have received my discharge instructions, and my questions have been answered. I have discussed any challenges I see with this plan with the nurse or doctor.    ..........................................................................................................................................  Patient/Patient Representative Signature      ..........................................................................................................................................  Patient Representative Print Name and Relationship to Patient    ..................................................               ................................................  Date                                            Time    ..........................................................................................................................................  Reviewed by Signature/Title    ...................................................              ..............................................  Date                                                            Time

## 2017-10-22 LAB
BACTERIA SPEC CULT: ABNORMAL
Lab: ABNORMAL
SPECIMEN SOURCE: ABNORMAL

## 2018-01-29 ENCOUNTER — HOSPITAL ENCOUNTER (EMERGENCY)
Facility: CLINIC | Age: 83
Discharge: HOME | End: 2018-01-29
Attending: EMERGENCY MEDICINE | Admitting: EMERGENCY MEDICINE
Payer: COMMERCIAL

## 2018-01-29 VITALS
SYSTOLIC BLOOD PRESSURE: 128 MMHG | HEART RATE: 95 BPM | RESPIRATION RATE: 18 BRPM | DIASTOLIC BLOOD PRESSURE: 74 MMHG | WEIGHT: 155 LBS | BODY MASS INDEX: 25.02 KG/M2 | OXYGEN SATURATION: 94 % | TEMPERATURE: 98.3 F

## 2018-01-29 DIAGNOSIS — N39.0 URINARY TRACT INFECTION ASSOCIATED WITH INDWELLING URETHRAL CATHETER, INITIAL ENCOUNTER (H): ICD-10-CM

## 2018-01-29 DIAGNOSIS — T83.511A URINARY TRACT INFECTION ASSOCIATED WITH INDWELLING URETHRAL CATHETER, INITIAL ENCOUNTER (H): ICD-10-CM

## 2018-01-29 DIAGNOSIS — R33.9 URINARY RETENTION: ICD-10-CM

## 2018-01-29 LAB
ALBUMIN UR-MCNC: 100 MG/DL
ANION GAP SERPL CALCULATED.3IONS-SCNC: 6 MMOL/L (ref 3–14)
APPEARANCE UR: ABNORMAL
BACTERIA #/AREA URNS HPF: ABNORMAL /HPF
BILIRUB UR QL STRIP: NEGATIVE
BUN SERPL-MCNC: 28 MG/DL (ref 7–30)
CALCIUM SERPL-MCNC: 10.6 MG/DL (ref 8.5–10.1)
CHLORIDE SERPL-SCNC: 110 MMOL/L (ref 94–109)
CO2 SERPL-SCNC: 22 MMOL/L (ref 20–32)
COLOR UR AUTO: YELLOW
CREAT SERPL-MCNC: 1.91 MG/DL (ref 0.66–1.25)
GFR SERPL CREATININE-BSD FRML MDRD: 34 ML/MIN/1.7M2
GLUCOSE SERPL-MCNC: 148 MG/DL (ref 70–99)
GLUCOSE UR STRIP-MCNC: NEGATIVE MG/DL
HGB UR QL STRIP: ABNORMAL
KETONES UR STRIP-MCNC: NEGATIVE MG/DL
LEUKOCYTE ESTERASE UR QL STRIP: ABNORMAL
NITRATE UR QL: NEGATIVE
PH UR STRIP: 7 PH (ref 5–7)
POTASSIUM SERPL-SCNC: 4.4 MMOL/L (ref 3.4–5.3)
RBC #/AREA URNS AUTO: 103 /HPF (ref 0–2)
SODIUM SERPL-SCNC: 138 MMOL/L (ref 133–144)
SOURCE: ABNORMAL
SP GR UR STRIP: 1.01 (ref 1–1.03)
UROBILINOGEN UR STRIP-MCNC: 0 MG/DL (ref 0–2)
WBC #/AREA URNS AUTO: >182 /HPF (ref 0–2)
WBC CLUMPS #/AREA URNS HPF: PRESENT /HPF

## 2018-01-29 PROCEDURE — 80048 BASIC METABOLIC PNL TOTAL CA: CPT | Performed by: EMERGENCY MEDICINE

## 2018-01-29 PROCEDURE — 99283 EMERGENCY DEPT VISIT LOW MDM: CPT

## 2018-01-29 PROCEDURE — 87086 URINE CULTURE/COLONY COUNT: CPT | Performed by: EMERGENCY MEDICINE

## 2018-01-29 PROCEDURE — 87088 URINE BACTERIA CULTURE: CPT | Performed by: EMERGENCY MEDICINE

## 2018-01-29 PROCEDURE — 87186 SC STD MICRODIL/AGAR DIL: CPT | Performed by: EMERGENCY MEDICINE

## 2018-01-29 PROCEDURE — 36415 COLL VENOUS BLD VENIPUNCTURE: CPT | Performed by: EMERGENCY MEDICINE

## 2018-01-29 PROCEDURE — 51702 INSERT TEMP BLADDER CATH: CPT

## 2018-01-29 PROCEDURE — 25000125 ZZHC RX 250

## 2018-01-29 PROCEDURE — 25000132 ZZH RX MED GY IP 250 OP 250 PS 637: Performed by: EMERGENCY MEDICINE

## 2018-01-29 PROCEDURE — 81001 URINALYSIS AUTO W/SCOPE: CPT | Performed by: EMERGENCY MEDICINE

## 2018-01-29 RX ORDER — SULFAMETHOXAZOLE/TRIMETHOPRIM 800-160 MG
1 TABLET ORAL ONCE
Status: COMPLETED | OUTPATIENT
Start: 2018-01-29 | End: 2018-01-29

## 2018-01-29 RX ORDER — SULFAMETHOXAZOLE/TRIMETHOPRIM 800-160 MG
1 TABLET ORAL DAILY
Qty: 6 TABLET | Refills: 0 | Status: SHIPPED | OUTPATIENT
Start: 2018-01-30 | End: 2018-02-05

## 2018-01-29 RX ADMIN — SULFAMETHOXAZOLE AND TRIMETHOPRIM 1 TABLET: 800; 160 TABLET ORAL at 15:31

## 2018-01-29 RX ADMIN — LIDOCAINE HYDROCHLORIDE 1 TUBE: 20 JELLY TOPICAL at 12:30

## 2018-01-29 ASSESSMENT — ENCOUNTER SYMPTOMS: ABDOMINAL PAIN: 0

## 2018-01-29 NOTE — ED AVS SNAPSHOT
[facility] Emergency Department    [address]    [facility] [address]    Phone:  [phone]    Fax:  [phone]                                       Edil Lopez   MRN: [id number]    Department:  [facility] Emergency Department   Date of Visit:  1/29/2018           After Visit Summary Signature Page     I have received my discharge instructions, and my questions have been answered. I have discussed any challenges I see with this plan with the nurse or doctor.    ..........................................................................................................................................  Patient/Patient Representative Signature      ..........................................................................................................................................  Patient Representative Print Name and Relationship to Patient    ..................................................               ................................................  Date                                            Time    ..........................................................................................................................................  Reviewed by Signature/Title    ...................................................              ..............................................  Date                                                            Time

## 2018-01-29 NOTE — ED AVS SNAPSHOT
[facility] Emergency Department    [address]    [facility] [address]    Phone:  [phone]    Fax:  [phone]                                       Edil Lopez   MRN: [id number]    Department:  [facility] Emergency Department   Date of Visit:  1/29/2018           Patient Information     Date Of Birth          7/21/1931        Your diagnoses for this visit were:     Urinary tract infection associated with indwelling urethral catheter, initial encounter (H)     Urinary retention        You were seen by Renea Hargrove MD.      Follow-up Information     Follow up with [facility] In 1 week.    Why:  As needed    Contact information:    [address]  [address]  [facility] [address] [phone]        Go to [facility] Emergency Department.    Specialty:  EMERGENCY MEDICINE    Why:  If symptoms worsen including confusion, fevers, catheter not draining    Contact information:    [address]  [facility] [address] [phone]      24 Hour Appointment Hotline       To make an appointment at any [facility] [place] clinic, call [phone] ([phone]). If you don't have a family doctor or clinic, we will help you find one. [facility] [place] clinics are conveniently located to serve the needs of you and your family.             Review of your medicines      START taking        Dose / Directions Last dose taken    sulfamethoxazole-trimethoprim 800-160 MG per tablet   Commonly known as:  BACTRIM DS   Dose:  1 tablet   Quantity:  6 tablet   Start taking on:  1/30/2018        Take 1 tablet by mouth daily for 6 days (first dose already given in ED on 1/29/18)   Refills:  0          Our records show that you are taking the medicines listed below. If these are incorrect, please call your family doctor or clinic.        Dose / Directions Last dose taken    acetaminophen 500 MG tablet   Commonly known as:  TYLENOL   Dose:   1000 mg   Quantity:  1 Bottle        Take 2 tablets (1,000 mg) by mouth 3 times daily 0800, 1400, 2200   Refills:  0        amiodarone 200 MG tablet   Commonly known as:  PACERONE/CODARONE   Dose:  200 mg   Quantity:  30 tablet        Take 1 tablet (200 mg) by mouth daily   Refills:  0        amLODIPine 10 MG tablet   Commonly known as:  NORVASC   Dose:  10 mg   Quantity:  60 tablet        Take 1 tablet (10 mg) by mouth daily   Refills:  0        bisacodyl 10 MG Suppository   Commonly known as:  DULCOLAX   Dose:  10 mg   Quantity:  30 suppository        Place 1 suppository (10 mg) rectally daily as needed for constipation   Refills:  0        calcium carbonate 500 MG chewable tablet   Commonly known as:  TUMS   Dose:  1 chew tab        Take 1 tablet (500 mg) by mouth 2 times daily as needed for heartburn   Refills:  0        diclofenac 1 % Gel topical gel   Commonly known as:  VOLTAREN   Quantity:  100 g        Apply  2 grams to R antecubital and L wrist old IV sites four times daily using enclosed dosing card.   Refills:  0        hydrALAZINE 25 MG tablet   Commonly known as:  APRESOLINE   Dose:  25 mg   Quantity:  90 tablet        Take 1 tablet (25 mg) by mouth every 8 hours   Refills:  0        * LORazepam 2 MG/ML (HIGH CONC) solution   Commonly known as:  LORazepam INTENSOL   Dose:  0.25-0.5 mg   Quantity:  30 mL        Take 0.13-0.25 mLs (0.26-0.5 mg) by mouth 2 times daily See also prn dosing   Refills:  0        * LORazepam 2 MG/ML (HIGH CONC) solution   Commonly known as:  ATIVAN   Dose:  0.25 mg        Take 0.13 mLs (0.26 mg) by mouth every 8 hours as needed See also scheduled doses. Hold for sedation.   Refills:  0        magnesium hydroxide 400 MG/5ML suspension   Commonly known as:  MILK OF MAGNESIA   Dose:  30 mL   Quantity:  105 mL        Take 30 mLs by mouth daily as needed for constipation or heartburn   Refills:  0        oxyCODONE IR 5 MG tablet   Commonly known as:  ROXICODONE   Dose:  2.5 mg    Quantity:  5 tablet        Take 0.5 tablets (2.5 mg) by mouth 4 times daily Hold for sedation   Refills:  0        pantoprazole Susp suspension   Commonly known as:  PROTONIX   Dose:  40 mg        Take 20 mLs (40 mg) by mouth every morning   Refills:  0        polyethylene glycol Packet   Commonly known as:  MIRALAX/GLYCOLAX   Dose:  17 g   Quantity:  7 packet        Take 17 g by mouth daily Hold for loose stools   Refills:  0        senna-docusate 8.6-50 MG per tablet   Commonly known as:  SENOKOT-S;PERICOLACE   Dose:  2 tablet        Take 2 tablets by mouth daily Hold for loose stools   Refills:  0        * Notice:  This list has 2 medication(s) that are the same as other medications prescribed for you. Read the directions carefully, and ask your doctor or other care provider to review them with you.            Prescriptions were sent or printed at these locations (1 Prescription)                   Other Prescriptions                Printed at Department/Unit printer (1 of 1)         sulfamethoxazole-trimethoprim (BACTRIM DS) 800-160 MG per tablet                Procedures and tests performed during your visit     Basic metabolic panel    Indwelling urinary catheter ([place])     UA with Microscopic    Urine Culture      Orders Needing Specimen Collection     None      Pending Results     Date and Time Order Name Status Description    1/29/2018 1451 Urine Culture In process             Pending Culture Results     Date and Time Order Name Status Description    1/29/2018 1451 Urine Culture In process             Pending Results Instructions     If you had any lab results that were not finalized at the time of your Discharge, you can call the ED Lab Result RN at [phone]. You will be contacted by this team for any positive Lab results or changes in treatment. The nurses are available 7 days a week from 10A to 6:30P.  You can leave a message 24 hours per day and they will return your call.        Test Results From  Your Hospital Stay        1/29/2018  2:06 PM      Component Results     Component Value Ref Range & Units Status    Color Urine Yellow  Final    Appearance Urine Cloudy  Final    Glucose Urine Negative NEG^Negative mg/dL Final    Bilirubin Urine Negative NEG^Negative Final    Ketones Urine Negative NEG^Negative mg/dL Final    Specific Gravity Urine 1.010 1.003 - 1.035 Final    Blood Urine Moderate (A) NEG^Negative Final    pH Urine 7.0 5.0 - 7.0 pH Final    Protein Albumin Urine 100 (A) NEG^Negative mg/dL Final    Urobilinogen mg/dL 0.0 0.0 - 2.0 mg/dL Final    Nitrite Urine Negative NEG^Negative Final    Leukocyte Esterase Urine Large (A) NEG^Negative Final    Source Catheterized Urine  Final    WBC Urine >182 (H) 0 - 2 /HPF Final    RBC Urine 103 (H) 0 - 2 /HPF Final    WBC Clumps Present (A) NEG^Negative /HPF Final    Bacteria Urine Many (A) NEG^Negative /HPF Final         1/29/2018  2:57 PM         1/29/2018  3:39 PM      Component Results     Component Value Ref Range & Units Status    Sodium 138 133 - 144 mmol/L Final    Potassium 4.4 3.4 - 5.3 mmol/L Final    Chloride 110 (H) 94 - 109 mmol/L Final    Carbon Dioxide 22 20 - 32 mmol/L Final    Anion Gap 6 3 - 14 mmol/L Final    Glucose 148 (H) 70 - 99 mg/dL Final    Urea Nitrogen 28 7 - 30 mg/dL Final    Creatinine 1.91 (H) 0.66 - 1.25 mg/dL Final    GFR Estimate 34 (L) >60 mL/min/1.7m2 Final    Non  GFR Calc    GFR Estimate If Black 41 (L) >60 mL/min/1.7m2 Final    African American GFR Calc    Calcium 10.6 (H) 8.5 - 10.1 mg/dL Final                Clinical Quality Measure: Blood Pressure Screening     Your blood pressure was checked while you were in the emergency department today. The last reading we obtained was  BP: 128/74 . Please read the guidelines below about what these numbers mean and what you should do about them.  If your systolic blood pressure (the top number) is less than 120 and your diastolic blood pressure (the bottom number)  "is less than 80, then your blood pressure is normal. There is nothing more that you need to do about it.  If your systolic blood pressure (the top number) is 120-139 or your diastolic blood pressure (the bottom number) is 80-89, your blood pressure may be higher than it should be. You should have your blood pressure rechecked within a year by a primary care provider.  If your systolic blood pressure (the top number) is 140 or greater or your diastolic blood pressure (the bottom number) is 90 or greater, you may have high blood pressure. High blood pressure is treatable, but if left untreated over time it can put you at risk for heart attack, stroke, or kidney failure. You should have your blood pressure rechecked by a primary care provider within the next 4 weeks.  If your provider in the emergency department today gave you specific instructions to follow-up with your doctor or provider even sooner than that, you should follow that instruction and not wait for up to 4 weeks for your follow-up visit.        Thank you for choosing [facility]       Thank you for choosing [facility] for your care. Our goal is always to provide you with excellent care. Hearing back from our patients is one way we can continue to improve our services. Please take a few minutes to complete the written survey that you may receive in the mail after you visit with us. Thank you!        [facility] Information     [email or web address] lets you send messages to your doctor, view your test results, renew your prescriptions, schedule appointments and more. To sign up, go to [email or web address] . Click on \"Log in\" on the left side of the screen, which will take you to the Welcome page. Then click on \"Sign up Now\" on the right side of the page.     You will be asked to enter the access code listed below, as well as some personal information. Please follow the directions to create your username and password.     Your access code is: [email or web address]  Expires: 4/29/2018  " 5:26 PM     Your access code will  in 90 days. If you need help or a new code, please call your [place] clinic or [phone].        Care EveryWhere ID     This is your Care EveryWhere ID. This could be used by other organizations to access your [facility] medical records  [id number]        Equal Access to Services     WILLIAM ACHARYA : Rhys Maldonado, ida coelho, radha murguia, arianna christensen . So [facility] [phone].    ATENCIÓN: Si habla español, tiene a jeffery disposición servicios gratuitos de asistencia lingüística. Llame al [phone].    We comply with applicable federal civil rights laws and [place] laws. We do not discriminate on the basis of race, color, national origin, age, disability, sex, sexual orientation, or gender identity.            After Visit Summary       This is your record. Keep this with you and show to your community pharmacist(s) and doctor(s) at your next visit.

## 2018-01-29 NOTE — ED PROVIDER NOTES
History     Chief Complaint:  Catheter Problem    History limited due to patient's dementia.     HPI   Edil Lopez is a 86 year old male who presents to the emergency department today via EMS for evaluation of a catheter problem. The patient reports that his catheter was removed at his nursing home due to pain this morning and then the nursing home was unable to replace the catheter, therefore he was sent to the emergency department. He reports penile pain and denies abdominal pain. He has some bleeding from the tip of his penis.  Denies nausea and vomiting.      Allergies:  Lisinopril  Penicillins     Medications:    LORazepam (LORAZEPAM INTENSOL) 2 MG/ML (HIGH CONC) solution  polyethylene glycol (MIRALAX/GLYCOLAX) Packet  senna-docusate (SENOKOT-S;PERICOLACE) 8.6-50 MG per tablet  pantoprazole (PROTONIX) SUSP suspension  amLODIPine (NORVASC) 10 MG tablet  hydrALAZINE (APRESOLINE) 25 MG tablet  amiodarone (PACERONE/CODARONE) 200 MG tablet    Past Medical History:    Anemia   BPH (benign prostatic hyperplasia)   Chronic kidney disease   CVA (cerebral infarction)   Dementia   Gastro-oesophageal reflux disease   Hyperparathyroidism (H)   Hypertension   Hypothyroidism   Neuromuscular disorder (H)   PVD (peripheral vascular disease) (H)     Past Surgical History:    Appendectomy  Endarterectomy carotid  Hernia repair  Suprapubic catheter placement    Family History:    History reviewed. No pertinent family history.     Social History:  The patient was alone.  Smoking Status: Former Smoker  Smokeless Tobacco: Never Used  Alcohol Use: No   Marital Status:        ROS limited.    Review of Systems   Gastrointestinal: Negative for abdominal pain.   Genitourinary: Positive for penile pain.        Catheter problem   All other systems reviewed and are negative.    Physical Exam   First Vitals:  BP: 136/84  Pulse: 95  Heart Rate: 95  Temp: 98.3  F (36.8  C)  Resp: 18  Weight: 70.3 kg (155 lb)  SpO2: 96 %    Physical  Exam  Constitutional: Alert, attentive, elderly male, pleasant, laying in bed   HENT:    Nose: Nose normal.    Mouth/Throat: Oropharynx is clear, mucous membranes are moist   Eyes: Normal conjunctiva. Pupils are equal, round, and reactive to light.   CV: regular rate and rhythm; no murmurs, rubs or gallups  Chest: Effort normal and breath sounds normal.   GI:  There is suprapubic tenderness and fullness with palpation. No distension. Normal bowel sounds.  : blood at the urethral meatus   MSK: Normal range of motion.   Neurological: Alert, attentive, oriented x2, hard of hearing, poor historian   Skin: Skin is warm and dry.     Emergency Department Course     Laboratory:  Laboratory findings were communicated with the patient who voiced understanding of the findings.  UA with Microscopic: Cloudy yellow urine, moderate blood, protein albumin 100, large leukocyte esterase, WBC/HPF >82 (H), RBC/ (H), WBC Clumps present, many bacteria o/w WNL   Urine Culture Aerobic Bacterial: Pending     Interventions:  1230: Lidocaine 2% topical gel 1 Tube Given  1531: Bactrim DS/Septra -160mg 1 tablet PO     Emergency Department Course:  Nursing notes and vitals reviewed.  The patient provided a urine sample here in the emergency department. This was sent for laboratory testing, findings above.   1228: I performed an exam of the patient as documented above.   1339: Patient rechecked and updated.  There were 700 ml of yellow urine out from his Ortiz catheter.  1550: I spoke with Pharmacy regarding patient's presentation, findings, and plan of care.   Findings and plan explained to the Patient. Patient discharged home with instructions regarding supportive care, medications, and reasons to return. The importance of close follow-up was reviewed. The patient was prescribed Bactrim.   I personally reviewed the laboratory results with the Patient and answered all related questions prior to discharge.     Impression & Plan       Medical Decision Making:  Edil Lopez is a 86 year old male who presents for evaluation of penile pain, decreased urinary output and a catheter issue.  I considered a broad differential including diverticulitis, aneurysm, urinary retention, ureterolithiasis, UTI, pyelonephritis, neurologic causes (MS, cauda equina,etc), colitis, etc.  The history and exam are consistent with acute urinary retention and this is confirmed after rutledge catheter placement.  Patient has chronic indwelling urinary catheter, and they were unable to replace it this morning during routine change at his care facility.  A urinalysis was obtained and was positive for catheter-acquired UTI.  The patient's most recent urine culture was susceptible to Bactrim and he discharged with this.  His dosing was modified based on his CKD and GFR.   Will send home with catheter and have patient followup with urology as needed. Patient has history of urinary retention and has chronic catheter use and known BPH. Medications for discharge noted below.  Patient is stable for discharge home.      Diagnosis:    ICD-10-CM    1. Urinary tract infection associated with indwelling urethral catheter, initial encounter (H) T83.511A    2. Urinary retention R33.9      Disposition:  discharged to home with below prescription  Discharge Medications:  New Prescriptions    SULFAMETHOXAZOLE-TRIMETHOPRIM (BACTRIM DS) 800-160 MG PER TABLET    Take 1 tablet by mouth daily for 6 days (first dose already given in ED on 1/29/18)     Scribe Disclosure:  I, Ning Quiñones, am serving as a scribe at 12:22 PM on 1/29/2018 to document services personally performed by Renea Hargrove MD based on my observations and the provider's statements to me.    1/29/2018   [facility] EMERGENCY DEPARTMENT       Renea Hargrove MD  01/29/18 2010

## 2018-01-30 ENCOUNTER — RECORDS - HEALTHEAST (OUTPATIENT)
Dept: LAB | Facility: CLINIC | Age: 83
End: 2018-01-30

## 2018-01-31 ENCOUNTER — RECORDS - HEALTHEAST (OUTPATIENT)
Dept: LAB | Facility: CLINIC | Age: 83
End: 2018-01-31

## 2018-01-31 LAB
BACTERIA SPEC CULT: ABNORMAL
Lab: ABNORMAL
SPECIMEN SOURCE: ABNORMAL
TSH SERPL DL<=0.005 MIU/L-ACNC: 62.24 UIU/ML (ref 0.3–5)

## 2018-02-01 LAB — TSH SERPL DL<=0.005 MIU/L-ACNC: 55.93 UIU/ML (ref 0.3–5)

## 2018-03-12 ENCOUNTER — HOSPITAL ENCOUNTER (EMERGENCY)
Facility: CLINIC | Age: 83
Discharge: NURSING, REHAB OR LONG-TERM CARE | End: 2018-03-12
Attending: EMERGENCY MEDICINE | Admitting: EMERGENCY MEDICINE
Payer: COMMERCIAL

## 2018-03-12 VITALS
OXYGEN SATURATION: 94 % | TEMPERATURE: 98 F | DIASTOLIC BLOOD PRESSURE: 82 MMHG | RESPIRATION RATE: 16 BRPM | HEART RATE: 92 BPM | SYSTOLIC BLOOD PRESSURE: 131 MMHG

## 2018-03-12 DIAGNOSIS — Z46.6 URINARY CATHETER (FOLEY) CHANGE REQUIRED: ICD-10-CM

## 2018-03-12 DIAGNOSIS — R30.0 DYSURIA: ICD-10-CM

## 2018-03-12 LAB
ALBUMIN UR-MCNC: 100 MG/DL
AMORPH CRY #/AREA URNS HPF: ABNORMAL /HPF
APPEARANCE UR: ABNORMAL
BACTERIA #/AREA URNS HPF: ABNORMAL /HPF
BILIRUB UR QL STRIP: NEGATIVE
COLOR UR AUTO: YELLOW
GLUCOSE UR STRIP-MCNC: NEGATIVE MG/DL
HGB UR QL STRIP: NEGATIVE
KETONES UR STRIP-MCNC: NEGATIVE MG/DL
LEUKOCYTE ESTERASE UR QL STRIP: ABNORMAL
NITRATE UR QL: NEGATIVE
PH UR STRIP: 7 PH (ref 5–7)
RBC #/AREA URNS AUTO: 4 /HPF (ref 0–2)
SOURCE: ABNORMAL
SP GR UR STRIP: 1.01 (ref 1–1.03)
SQUAMOUS #/AREA URNS AUTO: 1 /HPF (ref 0–1)
UROBILINOGEN UR STRIP-MCNC: 0 MG/DL (ref 0–2)
WBC #/AREA URNS AUTO: 102 /HPF (ref 0–5)

## 2018-03-12 PROCEDURE — 51702 INSERT TEMP BLADDER CATH: CPT

## 2018-03-12 PROCEDURE — 81001 URINALYSIS AUTO W/SCOPE: CPT | Performed by: EMERGENCY MEDICINE

## 2018-03-12 PROCEDURE — 99283 EMERGENCY DEPT VISIT LOW MDM: CPT

## 2018-03-12 PROCEDURE — 87088 URINE BACTERIA CULTURE: CPT | Performed by: EMERGENCY MEDICINE

## 2018-03-12 PROCEDURE — 87086 URINE CULTURE/COLONY COUNT: CPT | Performed by: EMERGENCY MEDICINE

## 2018-03-12 PROCEDURE — 87186 SC STD MICRODIL/AGAR DIL: CPT | Performed by: EMERGENCY MEDICINE

## 2018-03-12 RX ORDER — SULFAMETHOXAZOLE/TRIMETHOPRIM 800-160 MG
1 TABLET ORAL 2 TIMES DAILY
Qty: 6 TABLET | Refills: 0 | Status: SHIPPED | OUTPATIENT
Start: 2018-03-12 | End: 2018-03-15

## 2018-03-12 ASSESSMENT — ENCOUNTER SYMPTOMS: FEVER: 0

## 2018-03-12 NOTE — ED NOTES
Report called to care facility. Spoke to Manuel to give report. Legal guardian called, but there was no answer nor any voicemail available to leave message. Pt will return to facility via wheelchair van.

## 2018-03-12 NOTE — ED AVS SNAPSHOT
[facility] Emergency Department    [address]    [facility] [address]    Phone:  [phone]    Fax:  [phone]                                       Edil Lopez   MRN: [id number]    Department:  [facility] Emergency Department   Date of Visit:  3/12/2018           Patient Information     Date Of Birth          7/21/1931        Your diagnoses for this visit were:     Urinary catheter (Ortiz) change required     Dysuria        You were seen by Buffy Rod MD.      Follow-up Information     Follow up with Shirley Toscano MD In 2 days.    Specialty:  Family Practice    Contact information:    [facility]  [address]  [facility] [id number] [phone]          Discharge Instructions       Discharge Instructions  Urinary Tract Infection  You or your child have been diagnosed with a urinary tract infection, or UTI. The urinary tract includes the kidneys (which make urine/pee), ureters (the tubes that carry urine/pee from the kidneys to the bladder), the bladder (which stores urine/pee), and urethra (the tube that carries urine/pee out of the bladder). Urinary tract infections occur when bacteria travel up the urethra into the bladder (bladder infection) and, in some cases, from there into the kidneys (kidney infection).  Generally, every Emergency Department visit should have a follow-up clinic visit with either a primary or a specialty clinic/provider. Please follow-up as instructed by your emergency provider today.  Return to the Emergency Department if:    You or your child have severe back pain.    You or your child are vomiting (throwing up) so that you cannot take your medicine.    You or your child have a new fever (had not previously had a fever) over 101 F.    You or your child have confusion or are very weak, or feel very ill.    Your child seems much more ill, will not wake up, will not respond right, or is crying for a long time and will not  calm down.    You or your child are showing signs of dehydration. These signs may include decreased urination (pee), dry mouth/gums/tongue, or decreased activity.    Follow-up with your provider:     Children under 24 months need to be seen by their regular provider within one week after a diagnosis of a UTI. It may be necessary to do some more tests to look at the child s kidney or bladder.    You should begin to feel better within 24 - 48 hours of starting your antibiotic; follow-up with your regular clinic/doctor/provider if this is not the case.    Treatment:     You will be treated with an antibiotic to kill the bacteria. We have to make an educated guess, based on what we know about common bacteria and antibiotics, as to which antibiotic will work for your infection. We will be correct most times but there will be some cases where the antibiotic chosen is not correct (see urine cultures below).    Take a pain medication such as acetaminophen (Tylenol ) or ibuprofen (Advil , Motrin , Nuprin ).    Phenazopyridine (Pyridium , Uristat ) is a prescription medication that numbs the bladder to reduce the burning pain of some UTIs.  The same medication is available in a non-prescription version (Azo-Standard , Urodol ). This medication will change the color of the urine and tears (usually blue or orange). If you wear contacts, do not wear them while taking this medication as they may be stained by the medication.    Urine Cultures:    If indicated, a urine culture may have been performed today. This test generally takes 24-48 hours to complete so the results are not known at this time. The results can confirm that an infection is present but also determine which antibiotic is effective for the specific bacteria that is causing the infection. If your urine culture shows that the antibiotic you were given today will not work to treat your infection, we will attempt to contact you to make arrangements to change the  "antibiotic. If the culture confirms that the antibiotic is effective for your infection, you will not be contacted. We often recommend follow-up with your regular physician/provider on the culture results regardless of this process.    Antibiotic Warning:     If you have been placed on antibiotics - watch for signs of allergic reaction.  These include rash, lip swelling, difficulty breathing, wheezing, and dizziness.  If you develop any of these symptoms, stop the antibiotic immediately and go to an emergency room or urgent care for evaluation.    Probiotics: If you have been given an antibiotic, you may want to also take a probiotic pill or eat yogurt with live cultures. Probiotics have \"good bacteria\" to help your intestines stay healthy. Studies have shown that probiotics help prevent diarrhea and other intestine problems (including C. diff infection) when you take antibiotics. You can buy these without a prescription in the pharmacy section of the store.   If you were given a prescription for medicine here today, be sure to read all of the information (including the package insert) that comes with your prescription.  This will include important information about the medicine, its side effects, and any warnings that you need to know about.  The pharmacist who fills the prescription can provide more information and answer questions you may have about the medicine.  If you have questions or concerns that the pharmacist cannot address, please call or return to the Emergency Department.   Remember that you can always come back to the Emergency Department if you are not able to see your regular provider in the amount of time listed above, if you get any new symptoms, or if there is anything that worries you.    24 Hour Appointment Hotline       To make an appointment at any [facility], call [phone] ([phone]). If you don't have a family doctor or clinic, we will help you find one. [facility] " are conveniently located to serve the needs of you and your family.             Review of your medicines      START taking        Dose / Directions Last dose taken    sulfamethoxazole-trimethoprim 800-160 MG per tablet   Commonly known as:  BACTRIM DS   Dose:  1 tablet   Quantity:  6 tablet        Take 1 tablet by mouth 2 times daily for 3 days   Refills:  0          Our records show that you are taking the medicines listed below. If these are incorrect, please call your family doctor or clinic.        Dose / Directions Last dose taken    acetaminophen 500 MG tablet   Commonly known as:  TYLENOL   Dose:  1000 mg   Quantity:  1 Bottle        Take 2 tablets (1,000 mg) by mouth 3 times daily 0800, 1400, 2200   Refills:  0        amiodarone 200 MG tablet   Commonly known as:  PACERONE/CODARONE   Dose:  200 mg   Quantity:  30 tablet        Take 1 tablet (200 mg) by mouth daily   Refills:  0        amLODIPine 10 MG tablet   Commonly known as:  NORVASC   Dose:  10 mg   Quantity:  60 tablet        Take 1 tablet (10 mg) by mouth daily   Refills:  0        bisacodyl 10 MG Suppository   Commonly known as:  DULCOLAX   Dose:  10 mg   Quantity:  30 suppository        Place 1 suppository (10 mg) rectally daily as needed for constipation   Refills:  0        calcium carbonate 500 MG chewable tablet   Commonly known as:  TUMS   Dose:  1 chew tab        Take 1 tablet (500 mg) by mouth 2 times daily as needed for heartburn   Refills:  0        diclofenac 1 % Gel topical gel   Commonly known as:  VOLTAREN   Quantity:  100 g        Apply  2 grams to R antecubital and L wrist old IV sites four times daily using enclosed dosing card.   Refills:  0        hydrALAZINE 25 MG tablet   Commonly known as:  APRESOLINE   Dose:  25 mg   Quantity:  90 tablet        Take 1 tablet (25 mg) by mouth every 8 hours   Refills:  0        * LORazepam 2 MG/ML (HIGH CONC) solution   Commonly known as:  LORazepam INTENSOL   Dose:  0.25-0.5 mg   Quantity:  30  mL        Take 0.13-0.25 mLs (0.26-0.5 mg) by mouth 2 times daily See also prn dosing   Refills:  0        * LORazepam 2 MG/ML (HIGH CONC) solution   Commonly known as:  ATIVAN   Dose:  0.25 mg        Take 0.13 mLs (0.26 mg) by mouth every 8 hours as needed See also scheduled doses. Hold for sedation.   Refills:  0        magnesium hydroxide 400 MG/5ML suspension   Commonly known as:  MILK OF MAGNESIA   Dose:  30 mL   Quantity:  105 mL        Take 30 mLs by mouth daily as needed for constipation or heartburn   Refills:  0        oxyCODONE IR 5 MG tablet   Commonly known as:  ROXICODONE   Dose:  2.5 mg   Quantity:  5 tablet        Take 0.5 tablets (2.5 mg) by mouth 4 times daily Hold for sedation   Refills:  0        pantoprazole Susp suspension   Commonly known as:  PROTONIX   Dose:  40 mg        Take 20 mLs (40 mg) by mouth every morning   Refills:  0        polyethylene glycol Packet   Commonly known as:  MIRALAX/GLYCOLAX   Dose:  17 g   Quantity:  7 packet        Take 17 g by mouth daily Hold for loose stools   Refills:  0        senna-docusate 8.6-50 MG per tablet   Commonly known as:  SENOKOT-S;PERICOLACE   Dose:  2 tablet        Take 2 tablets by mouth daily Hold for loose stools   Refills:  0        * Notice:  This list has 2 medication(s) that are the same as other medications prescribed for you. Read the directions carefully, and ask your doctor or other care provider to review them with you.            Prescriptions were sent or printed at these locations (1 Prescription)                   Other Prescriptions                Printed at Department/Unit printer (1 of 1)         sulfamethoxazole-trimethoprim (BACTRIM DS) 800-160 MG per tablet                Procedures and tests performed during your visit     UA with Microscopic reflex to Culture    Urine Culture      Orders Needing Specimen Collection     None      Pending Results     Date and Time Order Name Status Description    3/12/2018 7010 Urine Culture  In process             Pending Culture Results     Date and Time Order Name Status Description    3/12/2018 1840 Urine Culture In process             Pending Results Instructions     If you had any lab results that were not finalized at the time of your Discharge, you can call the ED Lab Result RN at [phone]. You will be contacted by this team for any positive Lab results or changes in treatment. The nurses are available 7 days a week from 10A to 6:30P.  You can leave a message 24 hours per day and they will return your call.        Test Results From Your Hospital Stay        3/12/2018  6:48 PM      Component Results     Component Value Ref Range & Units Status    Color Urine Yellow  Final    Appearance Urine Cloudy  Final    Glucose Urine Negative NEG^Negative mg/dL Final    Bilirubin Urine Negative NEG^Negative Final    Ketones Urine Negative NEG^Negative mg/dL Final    Specific Gravity Urine 1.012 1.003 - 1.035 Final    Blood Urine Negative NEG^Negative Final    pH Urine 7.0 5.0 - 7.0 pH Final    Protein Albumin Urine 100 (A) NEG^Negative mg/dL Final    Urobilinogen mg/dL 0.0 0.0 - 2.0 mg/dL Final    Nitrite Urine Negative NEG^Negative Final    Leukocyte Esterase Urine Large (A) NEG^Negative Final    Source Catheterized Urine  Final    WBC Urine 102 (H) 0 - 5 /HPF Final    RBC Urine 4 (H) 0 - 2 /HPF Final    Bacteria Urine Few (A) NEG^Negative /HPF Final    Squamous Epithelial /HPF Urine 1 0 - 1 /HPF Final    Amorphous Crystals Moderate (A) NEG^Negative /HPF Final         3/12/2018  6:54 PM                Clinical Quality Measure: Blood Pressure Screening     Your blood pressure was checked while you were in the emergency department today. The last reading we obtained was  BP: 139/73 . Please read the guidelines below about what these numbers mean and what you should do about them.  If your systolic blood pressure (the top number) is less than 120 and your diastolic blood pressure (the bottom number) is less  "than 80, then your blood pressure is normal. There is nothing more that you need to do about it.  If your systolic blood pressure (the top number) is 120-139 or your diastolic blood pressure (the bottom number) is 80-89, your blood pressure may be higher than it should be. You should have your blood pressure rechecked within a year by a primary care provider.  If your systolic blood pressure (the top number) is 140 or greater or your diastolic blood pressure (the bottom number) is 90 or greater, you may have high blood pressure. High blood pressure is treatable, but if left untreated over time it can put you at risk for heart attack, stroke, or kidney failure. You should have your blood pressure rechecked by a primary care provider within the next 4 weeks.  If your provider in the emergency department today gave you specific instructions to follow-up with your doctor or provider even sooner than that, you should follow that instruction and not wait for up to 4 weeks for your follow-up visit.        Thank you for choosing [facility]       Thank you for choosing [facility] for your care. Our goal is always to provide you with excellent care. Hearing back from our patients is one way we can continue to improve our services. Please take a few minutes to complete the written survey that you may receive in the mail after you visit with us. Thank you!        HitchedPic Information     HitchedPic lets you send messages to your doctor, view your test results, renew your prescriptions, schedule appointments and more. To sign up, go to [email or web address] . Click on \"Log in\" on the left side of the screen, which will take you to the Welcome page. Then click on \"Sign up Now\" on the right side of the page.     You will be asked to enter the access code listed below, as well as some personal information. Please follow the directions to create your username and password.     Your access code is: [email or web address]  Expires: 4/29/2018  6:26 " PM     Your access code will  in 90 days. If you need help or a new code, please call your [facility] clinic or [phone].        Care EveryWhere ID     This is your Care EveryWhere ID. This could be used by other organizations to access your [facility] medical records  [id number]        Equal Access to Services     WILLIAM ACHARYA : Rhys Maldonado, waaxda luqadaha, qaybta kaalmaaddison murguia, arianna liu. So [facility] [phone].    ATENCIÓN: Si habla español, tiene a jeffery disposición servicios gratuitos de asistencia lingüística. Llame al [phone].    We comply with applicable federal civil rights laws and [place] laws. We do not discriminate on the basis of race, color, national origin, age, disability, sex, sexual orientation, or gender identity.            After Visit Summary       This is your record. Keep this with you and show to your community pharmacist(s) and doctor(s) at your next visit.

## 2018-03-12 NOTE — ED NOTES
16 fr catheter placed with assistance of Dr. Rod. Catheter was placed without issue. No complaints of pain by patient. No blood at meatus or in catheter noted.

## 2018-03-12 NOTE — ED AVS SNAPSHOT
[facility] Emergency Department    [address]    [facility] [address]    Phone:  [phone]    Fax:  [phone]                                       Edil Lopez   MRN: [id number]    Department:  [facility] Emergency Department   Date of Visit:  3/12/2018           After Visit Summary Signature Page     I have received my discharge instructions, and my questions have been answered. I have discussed any challenges I see with this plan with the nurse or doctor.    ..........................................................................................................................................  Patient/Patient Representative Signature      ..........................................................................................................................................  Patient Representative Print Name and Relationship to Patient    ..................................................               ................................................  Date                                            Time    ..........................................................................................................................................  Reviewed by Signature/Title    ...................................................              ..............................................  Date                                                            Time

## 2018-03-12 NOTE — ED NOTES
Bed: ED31  Expected date: 3/12/18  Expected time:   Means of arrival: Ambulance  Comments:  [place]

## 2018-03-12 NOTE — ED NOTES
Patient presents to the ED via EMS for evaluation of a catheter problem. Staff at his nursing facility report that the catheter was pulled out, and they were unable to replace the catheter. He is here for catheter reinsertion. ABCDs intact.

## 2018-03-12 NOTE — ED PROVIDER NOTES
History     Chief Complaint:  Catheter Problem    HPI   Edil Lopez is a 86 year old male who presents to the emergency department today for evaluation of a catheter problem. The patient was at his nursing home facility when he began to have pain associated with his catheter. Nursing home staff then removed the catheter and were unable to replace it, promting staff to contact EMS to transport the patient to the emergency department. He denies fever.     Allergies:  Lisinopril  Penicillins    Medications:    oxyCODONE (ROXICODONE) 5 MG IR tablet  acetaminophen (TYLENOL) 500 MG tablet  polyethylene glycol (MIRALAX/GLYCOLAX) Packet  senna-docusate (SENOKOT-S;PERICOLACE) 8.6-50 MG per tablet  amLODIPine (NORVASC) 10 MG tablet  hydrALAZINE (APRESOLINE) 25 MG tablet  amiodarone (PACERONE/CODARONE) 200 MG tablet    Past Medical History:    Anemia   BPH (benign prostatic hyperplasia)   Chronic kidney disease   CVA (cerebral infarction)   Dementia   Gastro-oesophageal reflux disease   Hyperparathyroidism    Hypertension   Hypothyroidism   Neuromuscular disorder   PVD (peripheral vascular disease)     Past Surgical History:    Appendectomy    Family History:    History reviewed. No pertinent family history.     Social History:  The patient was alone in the emergency department.   Smoking Status: former  Smokeless Tobacco: never  Alcohol Use: no  Marital Status:        Review of Systems   Constitutional: Negative for fever.   Gastrointestinal: Negative for abdominal pain.   Genitourinary: Positive for penile pain.   Musculoskeletal:        Pain with catheter     Physical Exam   First Vitals: /80  Pulse 92  Temp 98  F (36.7  C) (Oral)  Resp 20  SpO2 96%    Physical Exam  General: Patient is alert and interactive when I enter the room  Head:  The scalp, face, and head appear normal  Eyes:  Conjunctivae are normal  ENT:    The nose is normal    Pinnae are normal    External acoustic canals are  normal  Neck:  Trachea midline  CV:  Pulses are normal    Resp:  No respiratory distress   Abdomen:      Soft, non-tender, non-distended  Musc:  Normal muscular tone    No major joint effusions  : penis appears atraumatic, 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 Face is symmetric.     Moving all extremities well.   Psych: Awake. Alert.  Normal affect.  Appropriate interactions.          Emergency Department Course     Laboratory:  Laboratory findings were communicated with the patient who voiced understanding of the findings.    UA with Micro: few bacteria (A), protein albumin 100 (A), large leukocyte esterase (A), WBC/ (H), RBC/HPF 4 (H), moderate amorphous crystals (A).   Urine Culture: pending    Procedures:  Replaced Catheter    Emergency Department Course:  Nursing notes and vitals reviewed.  I performed an exam of the patient as documented above.   updated on the imaging and laboratory results.     Patient rechecked and updated.     I personally reviewed the laboratory results with the Patient and answered all related questions prior to  Discharge.  Findings and plan explained to the Patient. Patient discharged home with instructions regarding supportive care, medications, and reasons to return. The importance of close follow-up was reviewed. The patient was prescribed Bactrim.     Impression & Plan      Medical Decision Making:  Edil Lopez is a 86 year old male with a history of dementia who presents with catheter problem. His catheter came out today while trying to get it functioning again. He arrived with no catheter in. He has had a history of difficulty with Ortiz catheter replacement. He does not have a superpubic in. I have seen him before and was able to place a catheter without issue. He does have different anatomy where his meatus is located on the ventral side of his penis, likely from errosion. There  was signs of trauma or bleeding. He otherwise appears well. He vitals were unramarkable, no fever, no tachycardia. A 16 Lao coude was replaced without incident. He tolerated this well. Urine showed possible infection. I will start him on a 3 day course of bactrim as this was done previously. Patient was discharged to the nursing home in good condition.     Diagnosis:      1. Urinary catheter (Ortiz) change required   2. Dysuria      Disposition:  discharged to home    Discharge Medication List as of 3/12/2018  7:39 PM      START taking these medications    Details   sulfamethoxazole-trimethoprim (BACTRIM DS) 800-160 MG per tablet Take 1 tablet by mouth 2 times daily for 3 days, Disp-6 tablet, R-0, Local Print           Scribe Disclosure:  I, Seun Angelo, am serving as a scribe at 6:00 PM on 3/12/2018 to document services personally performed by Buffy Rod MD based on my observations and the provider's statements to me.     3/12/2018   [facility] EMERGENCY DEPARTMENT       Buffy Rod MD  03/13/18 [id number]

## 2018-03-13 ASSESSMENT — ENCOUNTER SYMPTOMS: ABDOMINAL PAIN: 0

## 2018-03-15 ENCOUNTER — TELEPHONE (OUTPATIENT)
Dept: EMERGENCY MEDICINE | Facility: CLINIC | Age: 83
End: 2018-03-15

## 2018-03-15 LAB
BACTERIA SPEC CULT: ABNORMAL
Lab: ABNORMAL
SPECIMEN SOURCE: ABNORMAL

## 2018-03-15 NOTE — TELEPHONE ENCOUNTER
[facility] Emergency Department Lab result notification [Adult-Male]    [facility] ED lab result protocol used  Urine culture     Reason for call  Notify of lab results, assess symptoms,  review ED providers recommendations/discharge instructions (if necessary) and advise per ED lab result f/u protocol    Lab Result (including Rx patient on, if applicable)  Final Urine Culture Report on 3/15/18  [place] ED discharge antibiotic: Sulfamethoxazole-Trimethoprim (Bactrim DS, Septra DS) 800-160 mg PO tablet,  1 tablet by mouth 2 times daily for 3 days.  #1. Bacteria, >100,000 col/ml Escherichia coli,  is [RESISTANT] to ED discharge antibiotic.   Change in treatment as per [facility] ED Lab result protocol.    Information table from ED Provider visit on 1/12/18  ED diagnosis: 1. Urinary catheter (Ortiz) change required   2. Dysuria        ED provider Buffy Rod MD  03/13/18 1233   Symptoms reported at ED visit (Chief complaint, HPI) Chief Complaint:  Catheter Problem     HPI   Edil Lopez is a 86 year old male who presents to the emergency department today for evaluation of a catheter problem. The patient was at his nursing home facility when he began to have pain associated with his catheter. Nursing home staff then removed the catheter and were unable to replace it, promting staff to contact EMS to transport the patient to the emergency department. He denies fever.    ED providers Impression and Plan (applicable information) Medical Decision Making:  Edil Lopez is a 86 year old male with a history of dementia who presents with catheter problem. His catheter came out today while trying to get it functioning again. He arrived with no catheter in. He has had a history of difficulty with Ortiz catheter replacement. He does not have a superpubic in. I have seen him before and was able to place a catheter without issue. He does have different anatomy where his meatus is located on the ventral side of his penis,  likely from errosion. There was signs of trauma or bleeding. He otherwise appears well. He vitals were unramarkable, no fever, no tachycardia. A 16 french coude was replaced without incident. He tolerated this well. Urine showed possible infection. I will start him on a 3 day course of bactrim as this was done previously. Patient was discharged to the nursing home in good condition.    Significant Medical hx, if applicable Ortiz cath, renal failure   Coumadin/Warfarin [Yes or No] No   Creatinine Level (mg/dl) 1/29/18 1.91   Creatinine clearance (ml/min), if applicable 27.6 mL/min   Allergies Lisinopril   Penicillins      Weight, if applicable 70.3 kg      RN Assessment (Patient s current Symptoms), include time called.  [Insert Left message here if message left]  10:36 am Pt is living at [facility]. I spoke with Nurse Jaspreet who requested I fax the final urine culture to [phone], so the patients providers can change treatment as needed. Done.     Please Contact your PCP clinic or return to the Emergency department if your:    Symptoms return.    Symptoms do not improve after 3 days on antibiotic.    Symptoms do not resolve after completing antibiotic.    Symptoms worsen or other concerning symptom's.    PCP follow-up Questions asked: YES       [RN Name]  Kena Tovar RN  [place] [facility] RN  Lung Nodule and ED Lab Result F/u RN  Epic pool (ED late result f/u RN): [id number]  # [phone]

## 2018-04-17 ENCOUNTER — RECORDS - HEALTHEAST (OUTPATIENT)
Dept: LAB | Facility: CLINIC | Age: 83
End: 2018-04-17

## 2018-04-18 LAB — TSH SERPL DL<=0.005 MIU/L-ACNC: 51.61 UIU/ML (ref 0.3–5)

## 2018-06-05 ENCOUNTER — RECORDS - HEALTHEAST (OUTPATIENT)
Dept: LAB | Facility: CLINIC | Age: 83
End: 2018-06-05

## 2018-06-06 ENCOUNTER — HOSPITAL ENCOUNTER (EMERGENCY)
Facility: CLINIC | Age: 83
Discharge: HOME | End: 2018-06-06
Attending: EMERGENCY MEDICINE | Admitting: EMERGENCY MEDICINE
Payer: COMMERCIAL

## 2018-06-06 VITALS
OXYGEN SATURATION: 91 % | RESPIRATION RATE: 18 BRPM | TEMPERATURE: 97.7 F | DIASTOLIC BLOOD PRESSURE: 79 MMHG | SYSTOLIC BLOOD PRESSURE: 137 MMHG

## 2018-06-06 DIAGNOSIS — R33.9 URINARY RETENTION: ICD-10-CM

## 2018-06-06 LAB
ALBUMIN UR-MCNC: 100 MG/DL
ANION GAP SERPL CALCULATED.3IONS-SCNC: 5 MMOL/L (ref 3–14)
ANION GAP SERPL CALCULATED.3IONS-SCNC: 7 MMOL/L (ref 5–18)
APPEARANCE UR: ABNORMAL
BACTERIA #/AREA URNS HPF: ABNORMAL /HPF
BILIRUB UR QL STRIP: NEGATIVE
BUN SERPL-MCNC: 26 MG/DL (ref 7–30)
BUN SERPL-MCNC: 26 MG/DL (ref 8–28)
CALCIUM SERPL-MCNC: 10.3 MG/DL (ref 8.5–10.1)
CALCIUM SERPL-MCNC: 10.9 MG/DL (ref 8.5–10.5)
CHLORIDE BLD-SCNC: 110 MMOL/L (ref 98–107)
CHLORIDE SERPL-SCNC: 109 MMOL/L (ref 94–109)
CO2 SERPL-SCNC: 21 MMOL/L (ref 22–31)
CO2 SERPL-SCNC: 23 MMOL/L (ref 20–32)
COLOR UR AUTO: YELLOW
CREAT SERPL-MCNC: 1.74 MG/DL (ref 0.7–1.3)
CREAT SERPL-MCNC: 1.81 MG/DL (ref 0.66–1.25)
GFR SERPL CREATININE-BSD FRML MDRD: 36 ML/MIN/1.7M2
GFR SERPL CREATININE-BSD FRML MDRD: 37 ML/MIN/1.73M2
GLUCOSE BLD-MCNC: 102 MG/DL (ref 70–125)
GLUCOSE SERPL-MCNC: 118 MG/DL (ref 70–99)
GLUCOSE UR STRIP-MCNC: NEGATIVE MG/DL
HGB UR QL STRIP: ABNORMAL
KETONES UR STRIP-MCNC: NEGATIVE MG/DL
LEUKOCYTE ESTERASE UR QL STRIP: ABNORMAL
MUCOUS THREADS #/AREA URNS LPF: PRESENT /LPF
NITRATE UR QL: NEGATIVE
PH UR STRIP: 7 PH (ref 5–7)
POTASSIUM BLD-SCNC: 4.3 MMOL/L (ref 3.5–5)
POTASSIUM SERPL-SCNC: 4.8 MMOL/L (ref 3.4–5.3)
RBC #/AREA URNS AUTO: 14 /HPF (ref 0–2)
SODIUM SERPL-SCNC: 137 MMOL/L (ref 133–144)
SODIUM SERPL-SCNC: 138 MMOL/L (ref 136–145)
SOURCE: ABNORMAL
SP GR UR STRIP: 1.01 (ref 1–1.03)
SQUAMOUS #/AREA URNS AUTO: <1 /HPF (ref 0–1)
T4 FREE SERPL-MCNC: 0.6 NG/DL (ref 0.7–1.8)
UROBILINOGEN UR STRIP-MCNC: 0 MG/DL (ref 0–2)
WBC #/AREA URNS AUTO: 59 /HPF (ref 0–5)
WBC CLUMPS #/AREA URNS HPF: PRESENT /HPF

## 2018-06-06 PROCEDURE — 51702 INSERT TEMP BLADDER CATH: CPT

## 2018-06-06 PROCEDURE — 99283 EMERGENCY DEPT VISIT LOW MDM: CPT | Mod: 25

## 2018-06-06 PROCEDURE — 81001 URINALYSIS AUTO W/SCOPE: CPT | Performed by: EMERGENCY MEDICINE

## 2018-06-06 PROCEDURE — 87086 URINE CULTURE/COLONY COUNT: CPT | Performed by: EMERGENCY MEDICINE

## 2018-06-06 PROCEDURE — 80048 BASIC METABOLIC PNL TOTAL CA: CPT | Performed by: EMERGENCY MEDICINE

## 2018-06-06 PROCEDURE — 87186 SC STD MICRODIL/AGAR DIL: CPT | Performed by: EMERGENCY MEDICINE

## 2018-06-06 PROCEDURE — 87088 URINE BACTERIA CULTURE: CPT | Performed by: EMERGENCY MEDICINE

## 2018-06-06 RX ORDER — CEPHALEXIN 500 MG/1
500 CAPSULE ORAL 2 TIMES DAILY
Qty: 14 CAPSULE | Refills: 0 | Status: SHIPPED | OUTPATIENT
Start: 2018-06-06 | End: 2018-06-13

## 2018-06-06 ASSESSMENT — ENCOUNTER SYMPTOMS
ABDOMINAL DISTENTION: 1
ABDOMINAL PAIN: 1
HEMATURIA: 0
FEVER: 0
NAUSEA: 0
VOMITING: 0
DIFFICULTY URINATING: 1
DYSURIA: 1

## 2018-06-06 NOTE — ED NOTES
Spoke with Loli from [facility] that patient resides at. Reviewed diagnosis and plan of care. Discussed antibiotic and suggested a care plan for catheter cares.

## 2018-06-06 NOTE — ED AVS SNAPSHOT
[facility] Emergency Department    [address]    [facility] [address]    Phone:  [phone]    Fax:  [phone]                                       Edil Lopez   MRN: [id number]    Department:  [facility] Emergency Department   Date of Visit:  6/6/2018           After Visit Summary Signature Page     I have received my discharge instructions, and my questions have been answered. I have discussed any challenges I see with this plan with the nurse or doctor.    ..........................................................................................................................................  Patient/Patient Representative Signature      ..........................................................................................................................................  Patient Representative Print Name and Relationship to Patient    ..................................................               ................................................  Date                                            Time    ..........................................................................................................................................  Reviewed by Signature/Title    ...................................................              ..............................................  Date                                                            Time

## 2018-06-06 NOTE — ED TRIAGE NOTES
Pt arrives via EMS from [facility] d/t catheter problems. Per EMS, staff states pt's rutledge wasn't draining this morning. Pt states he's had pain since last night. Unsure the last time his rutledge was changed. Was sent here because pt is a difficult insert for cath per EMS. ABC intact. A&O x4.

## 2018-06-06 NOTE — ED PROVIDER NOTES
History     Chief Complaint:  Catheter Problem    History limited by: poor historian.      Edil Lopze is a 86 year old male who presents with a catheter problem. The patient has a suprapubic catheter in place chronically and presents today for symptoms of pain at the catheter site as well as abdominal distention and pain. He has a history of urinary retention and states that he has not been experiencing any fevers, back pain, nausea, vomiting, hematuria, or any other symptoms recently.    Allergies:  Lisinopril  Penicillins     Medications:    Tylenol  Amiodarone  Norvasc  Dulcolax  Voltaren  Hydralazine  Ativan  Lorazepam  Milk of Magnesia  Oxycodone  Protonix  Miralax  Senokot     Past Medical History:    Anemia  Benign prostatic hypertrophy  Chronic kidney disease  CVA  Dementia  GERD  Hyperparathyroidism  Hypertension   Hypothyroidism  Neuromuscular disorder  PVD    Past Surgical History:    Appendectomy  Endarterectomy, carotid  Hernia repair  Suprapubic catheter    Family History:    No pertinent family history.    Social History:  Smoking status: Former smoker  Alcohol use: No  Marital Status:        Review of Systems   Constitutional: Negative for fever.   Gastrointestinal: Positive for abdominal distention and abdominal pain. Negative for nausea and vomiting.   Genitourinary: Positive for difficulty urinating and dysuria. Negative for hematuria.   All other systems reviewed and are negative.    Physical Exam     Patient Vitals for the past 24 hrs:   BP Temp Temp src Heart Rate Resp SpO2   06/06/18 1530 (!) 135/102 - - - - 90 %   06/06/18 1515 129/80 - - - - 97 %   06/06/18 1500 132/87 - - - - 92 %   06/06/18 1445 148/86 - - - - 94 %   06/06/18 1430 (!) 160/94 - - - - 94 %   06/06/18 1415 (!) 152/107 - - - - 92 %   06/06/18 1400 (!) 155/94 - - - - -   06/06/18 1345 (!) 135/96 - - - - 94 %   06/06/18 1330 140/86 - - - - 92 %   06/06/18 1321 150/88 97.7  F (36.5  C) Oral 87 18 95 %         Physical  Exam  Nursing note and vitals reviewed.  Constitutional: Cooperative.   HENT:   Mouth/Throat: Moist mucous membranes.   Eyes: EOMI, nonicteric sclera  Cardiovascular: Normal rate, regular rhythm, no murmurs, rubs, or gallops  Pulmonary/Chest: Effort normal and breath sounds normal. No respiratory distress. No wheezes. No rales.   Abdominal: Soft. TTP, nondistended, no guarding or rigidity. BS present.   Musculoskeletal: Normal range of motion.   Neurological: Alert. Moves all extremities spontaneously.   Skin: Skin is warm and dry. No rash noted.   Psychiatric: Normal mood and affect.       Emergency Department Course   Laboratory:  BMP: Glucose 118 (H), Creatinine 1.81 (H), GFR 36 (L), Calcium 10.3 (H), otherwise WNL    UA: Blood--small, Protein albumin 100, Leukocyte esterase--large, WBC 59 (H), RBC 14 (H), WBC clumps present, Bacteria--many, Mucous present, o/w negative    Emergency Department Course:  Nursing notes and vitals reviewed.  ([id number]) I performed an exam of the patient as documented above.    Blood was drawn from the patient. This was sent for laboratory testing, findings above.   Urine sample was obtained and sent for laboratory analysis, findings above.    Findings and plan explained to the patient. Patient discharged home with instructions regarding supportive care, medications, and reasons to return. The importance of close follow-up was reviewed.    Impression & Plan    Medical Decision Making:  Edil Lopez is a 86 year old male who presents for evaluation of abdominal pain and decreased penile pain located at chronic Rutledge catheter site.  I considered a broad differential including diverticulitis, aneurysm, urinary retention, ureterolithiasis, UTI, pyelonephritis, neurologic causes (MS, cauda equina,etc), colitis, etc.  The history and exam are consistent with acute urinary retention and this is confirmed after rutledge catheter replacement.  A urinalysis was obtained and suggests infection.   Catheter successfully replaced. Recommended to pt that he have it exchanged every month opposed to every 3 months. Patient is stable for discharge home.      Diagnosis:    ICD-10-CM   1. Urinary retention R33.9       Disposition:  Patient is discharged to home.    I, Yasir Tyson, am serving as a scribe on 6/6/2018 at 1:44 PM to personally document services performed by Dr. Douglas based on my observations and the provider's statements to me.    Yasir Tyson  6/6/2018   [facility] EMERGENCY DEPARTMENT       Burke Douglas MD  06/07/18 6026

## 2018-06-06 NOTE — DISCHARGE INSTRUCTIONS
Ortiz Catheter Care    A Ortiz catheter is a rubber tube that is placed through the urethra (opening where urine comes out) and into the bladder. This helps drain urine from the bladder. There is a small balloon on the end of the tube that is inflated after insertion. This keeps the catheter from sliding out of the bladder.  A Ortiz catheter is used to treat urinary retention (unable to pass urine). It is also used when there is incontinence (loss of bladder control).  Home care    Finish taking any prescribed antibiotic even if you are feeling better before then.    It is important to keep bacteria from getting into the collection bag. Do not disconnect the catheter from the collection bag.    Use a leg band to secure the drainage tube, so it does not pull on the catheter. Drain the collection bag when it becomes full using the drain spout at the bottom of the bag.    Do not try to pull or remove your catheter. This will injure your urethra. It must be removed by your healthcare provider or nurse.  Follow-up care  Follow up with your healthcare provider as advised for repeat urine testing and catheter removal or replacement.  When to seek medical advice  Call your healthcare provider right away if any of these occur:    Fever of 100.4 F (38 C) or higher, or as directed by your healthcare provider    Bladder pain or fullness    Abdominal swelling, nausea or vomiting, or back pain    Blood or urine leakage around the catheter    Bloody urine coming from the catheter (if a new symptom)    Catheter falls out    Catheter stops draining for 6 hours    Weakness, dizziness, or fainting  Date Last Reviewed: 10/1/2016    [id number] [employer]. [address] [place] [address], [place] [address]. All rights reserved. This information is not intended as a substitute for professional medical care. Always follow your healthcare professional's instructions.

## 2018-06-06 NOTE — ED AVS SNAPSHOT
[facility] Emergency Department    [address]    [facility] [address]    Phone:  [phone]    Fax:  [phone]                                       Edil Lopez   MRN: [id number]    Department:  [facility] Emergency Department   Date of Visit:  6/6/2018           Patient Information     Date Of Birth          7/21/1931        Your diagnoses for this visit were:     Urinary retention        You were seen by Burke Douglas MD.      Follow-up Information     Follow up with Shirley Toscano MD.    Specialty:  Family Practice    Contact information:    [facility]  [address]  [facility] [address]  [phone]          Follow up with [facility] Emergency Department.    Specialty:  EMERGENCY MEDICINE    Why:  As needed, If symptoms worsen    Contact information:    [address]  [facility] [address]  [phone]        Discharge Instructions         Ortiz Catheter Care    A Ortiz catheter is a rubber tube that is placed through the urethra (opening where urine comes out) and into the bladder. This helps drain urine from the bladder. There is a small balloon on the end of the tube that is inflated after insertion. This keeps the catheter from sliding out of the bladder.  A Ortiz catheter is used to treat urinary retention (unable to pass urine). It is also used when there is incontinence (loss of bladder control).  Home care    Finish taking any prescribed antibiotic even if you are feeling better before then.    It is important to keep bacteria from getting into the collection bag. Do not disconnect the catheter from the collection bag.    Use a leg band to secure the drainage tube, so it does not pull on the catheter. Drain the collection bag when it becomes full using the drain spout at the bottom of the bag.    Do not try to pull or remove your catheter. This will injure your urethra. It must be removed by your  healthcare provider or nurse.  Follow-up care  Follow up with your healthcare provider as advised for repeat urine testing and catheter removal or replacement.  When to seek medical advice  Call your healthcare provider right away if any of these occur:    Fever of 100.4 F (38 C) or higher, or as directed by your healthcare provider    Bladder pain or fullness    Abdominal swelling, nausea or vomiting, or back pain    Blood or urine leakage around the catheter    Bloody urine coming from the catheter (if a new symptom)    Catheter falls out    Catheter stops draining for 6 hours    Weakness, dizziness, or fainting  Date Last Reviewed: 10/1/2016    [id number] [employer] [address] [place] [address]. All rights reserved. This information is not intended as a substitute for professional medical care. Always follow your healthcare professional's instructions.          24 Hour Appointment Hotline       To make an appointment at any [facility], call [phone] ([phone]). If you don't have a family doctor or clinic, we will help you find one. [facility] clinics are conveniently located to serve the needs of you and your family.             Review of your medicines      START taking        Dose / Directions Last dose taken    cephALEXin 500 MG capsule   Commonly known as:  KEFLEX   Dose:  500 mg   Quantity:  14 capsule        Take 1 capsule (500 mg) by mouth 2 times daily for 7 days   Refills:  0          Our records show that you are taking the medicines listed below. If these are incorrect, please call your family doctor or clinic.        Dose / Directions Last dose taken    acetaminophen 500 MG tablet   Commonly known as:  TYLENOL   Dose:  1000 mg   Quantity:  1 Bottle        Take 2 tablets (1,000 mg) by mouth 3 times daily 0800, 1400, 2200   Refills:  0        amiodarone 200 MG tablet   Commonly known as:  PACERONE/CODARONE   Dose:  200 mg   Quantity:  30 tablet        Take 1  tablet (200 mg) by mouth daily   Refills:  0        amLODIPine 10 MG tablet   Commonly known as:  NORVASC   Dose:  10 mg   Quantity:  60 tablet        Take 1 tablet (10 mg) by mouth daily   Refills:  0        bisacodyl 10 MG Suppository   Commonly known as:  DULCOLAX   Dose:  10 mg   Quantity:  30 suppository        Place 1 suppository (10 mg) rectally daily as needed for constipation   Refills:  0        calcium carbonate 500 MG chewable tablet   Commonly known as:  TUMS   Dose:  1 chew tab        Take 1 tablet (500 mg) by mouth 2 times daily as needed for heartburn   Refills:  0        diclofenac 1 % Gel topical gel   Commonly known as:  VOLTAREN   Quantity:  100 g        Apply  2 grams to R antecubital and L wrist old IV sites four times daily using enclosed dosing card.   Refills:  0        hydrALAZINE 25 MG tablet   Commonly known as:  APRESOLINE   Dose:  25 mg   Quantity:  90 tablet        Take 1 tablet (25 mg) by mouth every 8 hours   Refills:  0        * LORazepam 2 MG/ML (HIGH CONC) solution   Commonly known as:  LORazepam INTENSOL   Dose:  0.25-0.5 mg   Quantity:  30 mL        Take 0.13-0.25 mLs (0.26-0.5 mg) by mouth 2 times daily See also prn dosing   Refills:  0        * LORazepam 2 MG/ML (HIGH CONC) solution   Commonly known as:  ATIVAN   Dose:  0.25 mg        Take 0.13 mLs (0.26 mg) by mouth every 8 hours as needed See also scheduled doses. Hold for sedation.   Refills:  0        magnesium hydroxide 400 MG/5ML suspension   Commonly known as:  MILK OF MAGNESIA   Dose:  30 mL   Quantity:  105 mL        Take 30 mLs by mouth daily as needed for constipation or heartburn   Refills:  0        oxyCODONE IR 5 MG tablet   Commonly known as:  ROXICODONE   Dose:  2.5 mg   Quantity:  5 tablet        Take 0.5 tablets (2.5 mg) by mouth 4 times daily Hold for sedation   Refills:  0        pantoprazole Susp suspension   Commonly known as:  PROTONIX   Dose:  40 mg        Take 20 mLs (40 mg) by mouth every morning    Refills:  0        polyethylene glycol Packet   Commonly known as:  MIRALAX/GLYCOLAX   Dose:  17 g   Quantity:  7 packet        Take 17 g by mouth daily Hold for loose stools   Refills:  0        senna-docusate 8.6-50 MG per tablet   Commonly known as:  SENOKOT-S;PERICOLACE   Dose:  2 tablet        Take 2 tablets by mouth daily Hold for loose stools   Refills:  0        * Notice:  This list has 2 medication(s) that are the same as other medications prescribed for you. Read the directions carefully, and ask your doctor or other care provider to review them with you.            Prescriptions were sent or printed at these locations (1 Prescription)                   Other Prescriptions                Printed at Department/Unit printer (1 of 1)         cephALEXin (KEFLEX) 500 MG capsule                Procedures and tests performed during your visit     Basic metabolic panel    UA with Microscopic reflex to Culture    Urine Culture Aerobic Bacterial      Orders Needing Specimen Collection     None      Pending Results     Date and Time Order Name Status Description    6/6/2018 1445 Urine Culture Aerobic Bacterial In process             Pending Culture Results     Date and Time Order Name Status Description    6/6/2018 1445 Urine Culture Aerobic Bacterial In process             Pending Results Instructions     If you had any lab results that were not finalized at the time of your Discharge, you can call the ED Lab Result RN at [phone]. You will be contacted by this team for any positive Lab results or changes in treatment. The nurses are available 7 days a week from 10A to 6:30P.  You can leave a message 24 hours per day and they will return your call.        Test Results From Your Hospital Stay        6/6/2018  2:43 PM      Component Results     Component Value Ref Range & Units Status    Sodium 137 133 - 144 mmol/L Final    Potassium 4.8 3.4 - 5.3 mmol/L Final    Chloride 109 94 - 109 mmol/L Final    Carbon  Dioxide 23 20 - 32 mmol/L Final    Anion Gap 5 3 - 14 mmol/L Final    Glucose 118 (H) 70 - 99 mg/dL Final    Urea Nitrogen 26 7 - 30 mg/dL Final    Creatinine 1.81 (H) 0.66 - 1.25 mg/dL Final    GFR Estimate 36 (L) >60 mL/min/1.7m2 Final    Non  GFR Calc    GFR Estimate If Black 43 (L) >60 mL/min/1.7m2 Final    African American GFR Calc    Calcium 10.3 (H) 8.5 - 10.1 mg/dL Final         6/6/2018  3:25 PM      Component Results     Component Value Ref Range & Units Status    Color Urine Yellow  Final    Appearance Urine Slightly Cloudy  Final    Glucose Urine Negative NEG^Negative mg/dL Final    Bilirubin Urine Negative NEG^Negative Final    Ketones Urine Negative NEG^Negative mg/dL Final    Specific Gravity Urine 1.009 1.003 - 1.035 Final    Blood Urine Small (A) NEG^Negative Final    pH Urine 7.0 5.0 - 7.0 pH Final    Protein Albumin Urine 100 (A) NEG^Negative mg/dL Final    Urobilinogen mg/dL 0.0 0.0 - 2.0 mg/dL Final    Nitrite Urine Negative NEG^Negative Final    Leukocyte Esterase Urine Large (A) NEG^Negative Final    Source Catheterized Urine  Final    WBC Urine 59 (H) 0 - 5 /HPF Final    RBC Urine 14 (H) 0 - 2 /HPF Final    WBC Clumps Present (A) NEG^Negative /HPF Final    Bacteria Urine Many (A) NEG^Negative /HPF Final    Squamous Epithelial /HPF Urine <1 0 - 1 /HPF Final    Mucous Urine Present (A) NEG^Negative /LPF Final         6/6/2018  3:33 PM                Clinical Quality Measure: Blood Pressure Screening     Your blood pressure was checked while you were in the emergency department today. The last reading we obtained was  BP: (!) 135/102 . Please read the guidelines below about what these numbers mean and what you should do about them.  If your systolic blood pressure (the top number) is less than 120 and your diastolic blood pressure (the bottom number) is less than 80, then your blood pressure is normal. There is nothing more that you need to do about it.  If your systolic blood  "pressure (the top number) is 120-139 or your diastolic blood pressure (the bottom number) is 80-89, your blood pressure may be higher than it should be. You should have your blood pressure rechecked within a year by a primary care provider.  If your systolic blood pressure (the top number) is 140 or greater or your diastolic blood pressure (the bottom number) is 90 or greater, you may have high blood pressure. High blood pressure is treatable, but if left untreated over time it can put you at risk for heart attack, stroke, or kidney failure. You should have your blood pressure rechecked by a primary care provider within the next 4 weeks.  If your provider in the emergency department today gave you specific instructions to follow-up with your doctor or provider even sooner than that, you should follow that instruction and not wait for up to 4 weeks for your follow-up visit.        Thank you for choosing [facility]       Thank you for choosing [facility] for your care. Our goal is always to provide you with excellent care. Hearing back from our patients is one way we can continue to improve our services. Please take a few minutes to complete the written survey that you may receive in the mail after you visit with us. Thank you!        [employer] Information     [employer] lets you send messages to your doctor, view your test results, renew your prescriptions, schedule appointments and more. To sign up, go to [email or web address] . Click on \"Log in\" on the left side of the screen, which will take you to the Welcome page. Then click on \"Sign up Now\" on the right side of the page.     You will be asked to enter the access code listed below, as well as some personal information. Please follow the directions to create your username and password.     Your access code is: [email or web address]  Expires: 2018  4:11 PM     Your access code will  in 90 days. If you need help or a new code, please call your [facility] clinic or " [phone].        Care EveryWhere ID     This is your Care EveryWhere ID. This could be used by other organizations to access your [facility] medical records  [id number]        Equal Access to Services     WILLIAM ACHARYA : Rhys Maldonado, ida coelho, qadanileta kareneaddison murguia, arianna liu. So [facility] [phone].    ATENCIÓN: Si habla español, tiene a jeffery disposición servicios gratuitos de asistencia lingüística. Llame al [phone].    We comply with applicable federal civil rights laws and [place] laws. We do not discriminate on the basis of race, color, national origin, age, disability, sex, sexual orientation, or gender identity.            After Visit Summary       This is your record. Keep this with you and show to your community pharmacist(s) and doctor(s) at your next visit.

## 2018-06-06 NOTE — ED NOTES
Bed: ED17  Expected date: 6/6/18  Expected time:   Means of arrival: Ambulance  Comments:  Alka Santos3

## 2018-06-08 LAB
BACTERIA SPEC CULT: ABNORMAL
BACTERIA SPEC CULT: ABNORMAL
Lab: ABNORMAL
SPECIMEN SOURCE: ABNORMAL

## 2018-07-10 ENCOUNTER — APPOINTMENT (OUTPATIENT)
Dept: CT IMAGING | Facility: CLINIC | Age: 83
End: 2018-07-10
Attending: EMERGENCY MEDICINE
Payer: COMMERCIAL

## 2018-07-10 ENCOUNTER — HOSPITAL ENCOUNTER (EMERGENCY)
Facility: CLINIC | Age: 83
Discharge: HOME | End: 2018-07-10
Attending: EMERGENCY MEDICINE | Admitting: EMERGENCY MEDICINE
Payer: COMMERCIAL

## 2018-07-10 VITALS — DIASTOLIC BLOOD PRESSURE: 88 MMHG | OXYGEN SATURATION: 94 % | SYSTOLIC BLOOD PRESSURE: 156 MMHG | TEMPERATURE: 97.9 F

## 2018-07-10 DIAGNOSIS — R10.84 ABDOMINAL PAIN, GENERALIZED: ICD-10-CM

## 2018-07-10 DIAGNOSIS — R33.9 URINARY RETENTION: ICD-10-CM

## 2018-07-10 DIAGNOSIS — K57.32 DIVERTICULITIS OF COLON: ICD-10-CM

## 2018-07-10 LAB
ALBUMIN UR-MCNC: >499 MG/DL
ANION GAP SERPL CALCULATED.3IONS-SCNC: 7 MMOL/L (ref 3–14)
APPEARANCE UR: ABNORMAL
BACTERIA #/AREA URNS HPF: ABNORMAL /HPF
BASOPHILS # BLD AUTO: 0.1 10E9/L (ref 0–0.2)
BASOPHILS NFR BLD AUTO: 0.5 %
BILIRUB UR QL STRIP: NEGATIVE
BUN SERPL-MCNC: 28 MG/DL (ref 7–30)
CALCIUM SERPL-MCNC: 10.5 MG/DL (ref 8.5–10.1)
CHLORIDE SERPL-SCNC: 108 MMOL/L (ref 94–109)
CO2 SERPL-SCNC: 22 MMOL/L (ref 20–32)
COLOR UR AUTO: YELLOW
CREAT SERPL-MCNC: 1.79 MG/DL (ref 0.66–1.25)
DIFFERENTIAL METHOD BLD: ABNORMAL
EOSINOPHIL # BLD AUTO: 0 10E9/L (ref 0–0.7)
EOSINOPHIL NFR BLD AUTO: 0 %
ERYTHROCYTE [DISTWIDTH] IN BLOOD BY AUTOMATED COUNT: 14.8 % (ref 10–15)
GFR SERPL CREATININE-BSD FRML MDRD: 36 ML/MIN/1.7M2
GLUCOSE SERPL-MCNC: 123 MG/DL (ref 70–99)
GLUCOSE UR STRIP-MCNC: NEGATIVE MG/DL
HCT VFR BLD AUTO: 33.1 % (ref 40–53)
HGB BLD-MCNC: 10.3 G/DL (ref 13.3–17.7)
HGB UR QL STRIP: ABNORMAL
IMM GRANULOCYTES # BLD: 0 10E9/L (ref 0–0.4)
IMM GRANULOCYTES NFR BLD: 0.3 %
INR PPP: 1.09 (ref 0.86–1.14)
KETONES UR STRIP-MCNC: NEGATIVE MG/DL
LEUKOCYTE ESTERASE UR QL STRIP: ABNORMAL
LYMPHOCYTES # BLD AUTO: 1.5 10E9/L (ref 0.8–5.3)
LYMPHOCYTES NFR BLD AUTO: 14.2 %
MCH RBC QN AUTO: 30.1 PG (ref 26.5–33)
MCHC RBC AUTO-ENTMCNC: 31.1 G/DL (ref 31.5–36.5)
MCV RBC AUTO: 97 FL (ref 78–100)
MONOCYTES # BLD AUTO: 1 10E9/L (ref 0–1.3)
MONOCYTES NFR BLD AUTO: 9.8 %
MUCOUS THREADS #/AREA URNS LPF: PRESENT /LPF
NEUTROPHILS # BLD AUTO: 7.9 10E9/L (ref 1.6–8.3)
NEUTROPHILS NFR BLD AUTO: 75.2 %
NITRATE UR QL: NEGATIVE
NRBC # BLD AUTO: 0 10*3/UL
NRBC BLD AUTO-RTO: 0 /100
PH UR STRIP: 7 PH (ref 5–7)
PLATELET # BLD AUTO: 285 10E9/L (ref 150–450)
POTASSIUM SERPL-SCNC: 4.3 MMOL/L (ref 3.4–5.3)
RBC # BLD AUTO: 3.42 10E12/L (ref 4.4–5.9)
RBC #/AREA URNS AUTO: 24 /HPF (ref 0–2)
SODIUM SERPL-SCNC: 137 MMOL/L (ref 133–144)
SOURCE: ABNORMAL
SP GR UR STRIP: 1.01 (ref 1–1.03)
UROBILINOGEN UR STRIP-MCNC: 0 MG/DL (ref 0–2)
WBC # BLD AUTO: 10.6 10E9/L (ref 4–11)
WBC #/AREA URNS AUTO: 89 /HPF (ref 0–5)
WBC CLUMPS #/AREA URNS HPF: PRESENT /HPF

## 2018-07-10 PROCEDURE — 99285 EMERGENCY DEPT VISIT HI MDM: CPT | Mod: 25

## 2018-07-10 PROCEDURE — 25000128 H RX IP 250 OP 636: Performed by: EMERGENCY MEDICINE

## 2018-07-10 PROCEDURE — 74176 CT ABD & PELVIS W/O CONTRAST: CPT

## 2018-07-10 PROCEDURE — 85610 PROTHROMBIN TIME: CPT | Performed by: EMERGENCY MEDICINE

## 2018-07-10 PROCEDURE — 96365 THER/PROPH/DIAG IV INF INIT: CPT

## 2018-07-10 PROCEDURE — 81001 URINALYSIS AUTO W/SCOPE: CPT | Performed by: EMERGENCY MEDICINE

## 2018-07-10 PROCEDURE — 85025 COMPLETE CBC W/AUTO DIFF WBC: CPT | Performed by: EMERGENCY MEDICINE

## 2018-07-10 PROCEDURE — 96375 TX/PRO/DX INJ NEW DRUG ADDON: CPT

## 2018-07-10 PROCEDURE — 80048 BASIC METABOLIC PNL TOTAL CA: CPT | Performed by: EMERGENCY MEDICINE

## 2018-07-10 PROCEDURE — 25000132 ZZH RX MED GY IP 250 OP 250 PS 637: Performed by: EMERGENCY MEDICINE

## 2018-07-10 RX ORDER — LEVOFLOXACIN 5 MG/ML
500 INJECTION, SOLUTION INTRAVENOUS ONCE
Status: DISCONTINUED | OUTPATIENT
Start: 2018-07-10 | End: 2018-07-10

## 2018-07-10 RX ORDER — SULFAMETHOXAZOLE/TRIMETHOPRIM 800-160 MG
1 TABLET ORAL 2 TIMES DAILY
Qty: 20 TABLET | Refills: 0 | Status: SHIPPED | OUTPATIENT
Start: 2018-07-10 | End: 2018-07-20

## 2018-07-10 RX ORDER — CEFTRIAXONE 2 G/1
2 INJECTION, POWDER, FOR SOLUTION INTRAMUSCULAR; INTRAVENOUS ONCE
Status: COMPLETED | OUTPATIENT
Start: 2018-07-10 | End: 2018-07-10

## 2018-07-10 RX ORDER — METRONIDAZOLE 500 MG/1
500 TABLET ORAL ONCE
Status: COMPLETED | OUTPATIENT
Start: 2018-07-10 | End: 2018-07-10

## 2018-07-10 RX ORDER — MORPHINE SULFATE 2 MG/ML
2 INJECTION, SOLUTION INTRAMUSCULAR; INTRAVENOUS ONCE
Status: COMPLETED | OUTPATIENT
Start: 2018-07-10 | End: 2018-07-10

## 2018-07-10 RX ORDER — CIPROFLOXACIN 500 MG/1
500 TABLET, FILM COATED ORAL 2 TIMES DAILY
Qty: 14 TABLET | Refills: 0 | Status: SHIPPED | OUTPATIENT
Start: 2018-07-10 | End: 2018-07-10

## 2018-07-10 RX ORDER — METRONIDAZOLE 500 MG/1
500 TABLET ORAL 3 TIMES DAILY
Qty: 30 TABLET | Refills: 0 | Status: SHIPPED | OUTPATIENT
Start: 2018-07-10 | End: 2018-07-20

## 2018-07-10 RX ORDER — METRONIDAZOLE 500 MG/1
500 TABLET ORAL 2 TIMES DAILY
Qty: 14 TABLET | Refills: 1 | Status: SHIPPED | OUTPATIENT
Start: 2018-07-10 | End: 2018-07-10

## 2018-07-10 RX ORDER — CIPROFLOXACIN 500 MG/1
500 TABLET, FILM COATED ORAL ONCE
Status: DISCONTINUED | OUTPATIENT
Start: 2018-07-10 | End: 2018-07-10

## 2018-07-10 RX ADMIN — CEFTRIAXONE SODIUM 2 G: 2 INJECTION, POWDER, FOR SOLUTION INTRAMUSCULAR; INTRAVENOUS at 16:01

## 2018-07-10 RX ADMIN — METRONIDAZOLE 500 MG: 500 TABLET ORAL at 14:32

## 2018-07-10 RX ADMIN — MORPHINE SULFATE 2 MG: 2 INJECTION, SOLUTION INTRAMUSCULAR; INTRAVENOUS at 12:33

## 2018-07-10 ASSESSMENT — ENCOUNTER SYMPTOMS: ABDOMINAL PAIN: 1

## 2018-07-10 NOTE — ED AVS SNAPSHOT
[facility] Emergency Department    [address]    [facility] [address]    Phone:  [phone]    Fax:  [phone]                                       Edil Lopez   MRN: [id number]    Department:  [facility] Emergency Department   Date of Visit:  7/10/2018           After Visit Summary Signature Page     I have received my discharge instructions, and my questions have been answered. I have discussed any challenges I see with this plan with the nurse or doctor.    ..........................................................................................................................................  Patient/Patient Representative Signature      ..........................................................................................................................................  Patient Representative Print Name and Relationship to Patient    ..................................................               ................................................  Date                                            Time    ..........................................................................................................................................  Reviewed by Signature/Title    ...................................................              ..............................................  Date                                                            Time

## 2018-07-10 NOTE — ED NOTES
Pt ready to dc back to facility with transport. Sent with paperwork packet and scripts. IV nixon Ortiz with good output

## 2018-07-10 NOTE — PROGRESS NOTES
[facility] called to [facility]. Transportation will be arranged for dc

## 2018-07-10 NOTE — ED PROVIDER NOTES
"  History     Chief Complaint:  Catheter problem     HPI   Edil Lopez is a 86 year old male who presents with catheter problem.  Patient says that he began having abdominal pain last night and says that his catheter is not draining.  Therefore, patient presents to the ED for further evaluation.  Here in the ED, patient says that his \"kidney and penis\" hurt.      Patient previously had a suprapubic catheter but pulled it out in April 2018 and it was then unable to be replaced.  Then urethral catheter was placed.      Allergies:  Lisinopril  Penicillins      Medications:    Tylenol  Amiodarone  Norvasc  Dulcolax  Voltaren  Hydralazine  Ativan  Lorazepam  Milk of Magnesia  Oxycodone  Protonix  Miralax  Senokot      Past Medical History:    Anemia  Benign prostatic hypertrophy  Chronic kidney disease  CVA  Dementia  GERD  Hyperparathyroidism  Hypertension   Hypothyroidism  Neuromuscular disorder  PVD     Past Surgical History:    Appendectomy  Endarterectomy, carotid  Hernia repair  Suprapubic catheter     Family History:    No pertinent family history.     Social History:  Smoking status: Former smoker  Alcohol use: No  Marital Status:        Review of Systems   Gastrointestinal: Positive for abdominal pain.   All other systems reviewed and are negative.      Physical Exam     Patient Vitals for the past 24 hrs:   BP Temp Temp src Heart Rate SpO2   07/10/18 1330 - - - - 95 %   07/10/18 1317 - - - - (!) 88 %   07/10/18 1315 - - - - 95 %   07/10/18 1300 - - - - (!) 89 %   07/10/18 1230 - - - - 94 %   07/10/18 1228 - - - - 94 %   07/10/18 1227 - - - - 92 %   07/10/18 1226 - - - - 93 %   07/10/18 1225 - - - - 93 %   07/10/18 1224 - - - - 93 %   07/10/18 1206 (!) 152/92 97.9  F (36.6  C) Oral 95 91 %       Physical Exam  General: The patient is alert, in no respiratory distress.    HENT: Mucous membranes moist.    Cardiovascular: Regular rate and rhythm. Good pulses in all four extremities. Normal capillary refill " and skin turgor.     Respiratory: Lungs are clear. No nasal flaring. No retractions. No wheezing, no crackles.    Gastrointestinal: Abdomen soft. No guarding, no rebound. No palpable hernias. Lower abdomen tender to palpation.      Musculoskeletal: No gross deformity.     Skin: No rashes or petechiae.     Neurologic: The patient is alert. GCS 15. No testable cranial nerve deficit. Follows commands with clear and appropriate speech. Gives appropriate answers. Good strength in all extremities. No gross neurologic deficit. Gross sensation intact. Pupils are round and reactive. No meningismus.     Lymphatic: No cervical adenopathy. No lower extremity swelling.    Psychiatric: The patient is non-tearful.    :  Inferior urethral hypospadies.  No rutledge present.  Dry blood on penis.      Emergency Department Course     Imaging:  Radiographic findings were communicated with the patient who voiced understanding of the findings.  CT Abdomen/Pelvis without IV contrast:   1. Findings are suspicious for mild diverticulitis in the distal  descending/proximal sigmoid colon. No evidence for associated abscess.  2. There is moderate bladder distention and mild bilateral  hydronephrosis.  3. There are several bladder stones noted, as well as a nonobstructing  stone in the lower pole of the right kidney. These stones do not  appear to cause the bladder distention and mild bilateral  hydronephrosis.  4. Mild prostatic enlargement.   As per radiology.    Laboratory:  UA with micro: Urine blood small, protein albumin urine > 499, leukocyte esterase urine large, WBC/hpf 89 high, RBC/hpf 24 high, WBC clumps present, bacteria few, mucus urine present o/w negative  CBC: WBC: 10.6, HGB: 10.3 L, PLT: 285  INR:1.09  BMP: Glucose 123 H, Creatinine 1.79 H, GFR estimate 36 L, GFR estimate if black 44 L, Calcium 10.5 H, o/w WNL    Interventions:  1233 morphine 2 mg IV    Emergency Department Course:  Nursing notes and vitals reviewed.  I performed  an exam of the patient as documented above.     IV inserted. Medicine administered as documented above. Blood drawn. This was sent to the lab for further testing, results above.    The patient provided a urine sample here in the emergency department. This was sent for laboratory testing, findings above.     The patient was sent for a CT while in the emergency department, findings above.     Rutledge catheter replaced through the urethra .    1340 I rechecked the patient and discussed the results of his workup thus far. He is feeling better.      Findings and plan explained to the Patient. Patient discharged home with instructions regarding supportive care, medications, and reasons to return. The importance of close follow-up was reviewed. The patient was prescribed Cipro and Flagyl.     I personally reviewed the laboratory results with the Patient and answered all related questions prior to discharge.     Impression & Plan      Medical Decision Making:  The patient presented with abdominal pain. He also had a difficult time replacing the rutledge catheter.  I have looked through his chart and he previously had a suprapubic catheter but it looks like they have not been able to access it since and therefore the patient has had a ureteral catheter.  We therefore replaced the rutledge catheter which did help his pain.  He had tenderness and therefore was sent for a CT scan and labs.  His white count was normal.  There is signs of diverticulitis and was therefore started on antibiotics and was discharged home in good condition to close follow-up.  I did not find signs of a kidney stone, bowel obstruction, or other issue.    Diagnosis:    ICD-10-CM    1. Abdominal pain, generalized R10.84 UA with Microscopic   2. Diverticulitis of colon K57.32    3. Urinary retention R33.9        Disposition:  discharged to home    Discharge Medications:  New Prescriptions    CIPROFLOXACIN (CIPRO) 500 MG TABLET    Take 1 tablet (500 mg) by mouth 2  times daily for 7 days    METRONIDAZOLE (FLAGYL) 500 MG TABLET    Take 1 tablet (500 mg) by mouth 2 times daily for 7 days       Lowell Cobos  7/10/2018   [facility] EMERGENCY DEPARTMENT  I, Lowell Cobos, am serving as a scribe at 12:00 PM on 7/10/2018 to document services personally performed by Jj Aguayo MD based on my observations and the provider's statements to me.        Jj Aguayo MD  08/20/18 1144

## 2018-07-10 NOTE — ED AVS SNAPSHOT
[facility]    [address]    [facility] [address]    Phone:  [phone]    Fax:  [phone]                                       Edil Lopez   MRN: [id number]    Department:  [facility]   Date of Visit:  7/10/2018           Patient Information     Date Of Birth          7/21/1931        Your diagnoses for this visit were:     Abdominal pain, generalized     Diverticulitis of colon     Urinary retention        You were seen by Jj Aguayo MD.      Follow-up Information     Follow up with Shirley Toscano MD. Schedule an appointment as soon as possible for a visit in 2 days.    Specialty:  Family Practice    Contact information:    [facility]  [address] [place] [address]  [phone]          Discharge Instructions       Discharge Instructions  Abdominal Pain    Abdominal pain can be caused by many things. Your evaluation today does not show the exact cause for your pain. Your doctor today has decided that it is unlikely your pain is due to a life threatening problem, or a problem requiring surgery or hospital admission. Sometimes those problems cannot be found right away, so it is very important that you follow up as directed.  Sometimes only the changes which occur over time allow the cause of your pain to be found.    Return to the Emergency Department for a recheck in 8-12 hours if your pain continues.  If your pain gets worse, changes in location, or feels different, return to the Emergency Department right away.    ADULTS:  Return to the Emergency Department right away if:      You get an oral temperature above 102oF or as directed by your doctor.    You have blood in your stools (bright red or black, tarry stools).    You keep throwing up or can t drink liquids.    You see blood when you throw up.    You can t have a bowel movement or you can t pass gas.    Your stomach gets bloated or  bigger.    Your skin or the whites of your eyes look yellow.    You faint.    You have bloody, frequent or painful urination.    You have new symptoms or anything that worries you.    CHILDREN:  Return to the Emergency Department right away if your child has any of the above-listed symptoms or the following:      Pushes your hand away or screams/cries when his/her belly is touched.    You notice your child is very fussy or weak.    Your child is very tired and is too tired to eat or drink.    Your child is dehydrated.  Signs of dehydration can be:  o Your infant has had no wet diapers in 4-5 hours.  o Your older child has not passed urine in 6-8 hours.  o Your infant or child starts to have dry mouth and lips, or no saliva or tears.    PREGNANT WOMEN:  Return to the Emergency Department right away if you have any of the above-listed symptoms or the following:      You have bleeding, leaking fluid or passing tissue from the vagina.    You have worse pain or cramping, or pain in your shoulder or back.    You have vomiting that will not stop.    You have painful or bloody urination.    You have a temperature of 100oF or more.    Your baby is not moving as much as usual.    You faint.    You get a bad headache with or without eye problems and abdominal pain.    You have a convulsion or seizure.    You have unusual discharge from your vagina and abdominal pain.    Abdominal pain is pretty common during pregnancy.  Your pain may or may not be related to your pregnancy. You should follow-up closely with your OB doctor so they can evaluate you and your baby.  Until you follow-up with your regular doctor, do the following:       Avoid sex and do not put anything in your vagina.    Drink clear fluids.    Only take medications approved by your doctor.    MORE INFORMATION:    Appendicitis:  A possible cause of abdominal pain in any person who still has their appendix is acute appendicitis. Appendicitis is often hard to  "diagnose.  Testing does not always rule out early appendicitis or other causes of abdominal pain. Close follow-up with your doctor and re-evaluations may be needed to figure out the reason for your abdominal pain.    Follow-up:  It is very important that you make an appointment with your clinic and go to the appointment.  If you do not follow-up with your primary doctor, it may result in missing an important development which could result in permanent injury or disability and/or lasting pain.  If there is any problem keeping your appointment, call your doctor or return to the Emergency Department.    Medications:  Take your medications as directed by your doctor today.  Before using over-the-counter medications, ask your doctor and make sure to take the medications as directed.  If you have any questions about medications, ask your doctor.    Diet:  Resume your normal diet as much as possible, but do not eat fried, fatty or spicy foods while you have pain.  Do not drink alcohol or have caffeine.  Do not smoke tobacco.    Probiotics: If you have been given an antibiotic, you may want to also take a probiotic pill or eat yogurt with live cultures. Probiotics have \"good bacteria\" to help your intestines stay healthy. Studies have shown that probiotics help prevent diarrhea and other intestine problems (including C. diff infection) when you take antibiotics. You can buy these without a prescription in the pharmacy section of the store.     If you were given a prescription for medicine here today, be sure to read all of the information (including the package insert) that comes with your prescription.  This will include important information about the medicine, its side effects, and any warnings that you need to know about.  The pharmacist who fills the prescription can provide more information and answer questions you may have about the medicine.  If you have questions or concerns that the pharmacist cannot address, " please call or return to the Emergency Department.         Opioid Medication Information    Pain medications are among the most commonly prescribed medicines, so we are including this information for all our patients. If you did not receive pain medication or get a prescription for pain medicine, you can ignore it.     You may have been given a prescription for an opioid (narcotic) pain medicine and/or have received a pain medicine while here in the Emergency Department. These medicines can make you drowsy or impaired. You must not drive, operate dangerous equipment, or engage in any other dangerous activities while taking these medications. If you drive while taking these medications, you could be arrested for DUI, or driving under the influence. Do not drink any alcohol while you are taking these medications.     Opioid pain medications can cause addiction. If you have a history of chemical dependency of any type, you are at a higher risk of becoming addicted to pain medications.  Only take these prescribed medications to treat your pain when all other options have been tried. Take it for as short a time and as few doses as possible. Store your pain pills in a secure place, as they are frequently stolen and provide a dangerous opportunity for children or visitors in your house to start abusing these powerful medications. We will not replace any lost or stolen medicine.  As soon as your pain is better, you should flush all your remaining medication.     Many prescription pain medications contain Tylenol  (acetaminophen), including Vicodin , Tylenol #3 , Norco , Lortab , and Percocet .  You should not take any extra pills of Tylenol  if you are using these prescription medications or you can get very sick.  Do not ever take more than 3000 mg of acetaminophen in any 24 hour period.    All opioids tend to cause constipation. Drink plenty of water and eat foods that have a lot of fiber, such as fruits, vegetables,  prune juice, apple juice and high fiber cereal.  Take a laxative if you don t move your bowels at least every other day. Miralax , Milk of Magnesia, Colace , or Senna  can be used to keep you regular.      Remember that you can always come back to the Emergency Department if you are not able to see your regular doctor in the amount of time listed above, if you get any new symptoms, or if there is anything that worries you.          24 Hour Appointment Hotline       To make an appointment at any [facility], call [phone] ([phone]). If you don't have a family doctor or clinic, we will help you find one. [facility] clinics are conveniently located to serve the needs of you and your family.             Review of your medicines      START taking        Dose / Directions Last dose taken    metroNIDAZOLE 500 MG tablet   Commonly known as:  FLAGYL   Dose:  500 mg   Quantity:  30 tablet        Take 1 tablet (500 mg) by mouth 3 times daily for 10 days   Refills:  0        sulfamethoxazole-trimethoprim 800-160 MG per tablet   Commonly known as:  BACTRIM DS   Dose:  1 tablet   Quantity:  20 tablet        Take 1 tablet by mouth 2 times daily for 10 days   Refills:  0          Our records show that you are taking the medicines listed below. If these are incorrect, please call your family doctor or clinic.        Dose / Directions Last dose taken    acetaminophen 500 MG tablet   Commonly known as:  TYLENOL   Dose:  1000 mg   Quantity:  1 Bottle        Take 2 tablets (1,000 mg) by mouth 3 times daily 0800, 1400, 2200   Refills:  0        amiodarone 200 MG tablet   Commonly known as:  PACERONE/CODARONE   Dose:  200 mg   Quantity:  30 tablet        Take 1 tablet (200 mg) by mouth daily   Refills:  0        amLODIPine 10 MG tablet   Commonly known as:  NORVASC   Dose:  10 mg   Quantity:  60 tablet        Take 1 tablet (10 mg) by mouth daily   Refills:  0        bisacodyl 10 MG Suppository   Commonly known as:  DULCOLAX    Dose:  10 mg   Quantity:  30 suppository        Place 1 suppository (10 mg) rectally daily as needed for constipation   Refills:  0        calcium carbonate 500 MG chewable tablet   Commonly known as:  TUMS   Dose:  1 chew tab        Take 1 tablet (500 mg) by mouth 2 times daily as needed for heartburn   Refills:  0        diclofenac 1 % Gel topical gel   Commonly known as:  VOLTAREN   Quantity:  100 g        Apply  2 grams to R antecubital and L wrist old IV sites four times daily using enclosed dosing card.   Refills:  0        hydrALAZINE 25 MG tablet   Commonly known as:  APRESOLINE   Dose:  25 mg   Quantity:  90 tablet        Take 1 tablet (25 mg) by mouth every 8 hours   Refills:  0        * LORazepam 2 MG/ML (HIGH CONC) solution   Commonly known as:  LORazepam INTENSOL   Dose:  0.25-0.5 mg   Quantity:  30 mL        Take 0.13-0.25 mLs (0.26-0.5 mg) by mouth 2 times daily See also prn dosing   Refills:  0        * LORazepam 2 MG/ML (HIGH CONC) solution   Commonly known as:  ATIVAN   Dose:  0.25 mg        Take 0.13 mLs (0.26 mg) by mouth every 8 hours as needed See also scheduled doses. Hold for sedation.   Refills:  0        magnesium hydroxide 400 MG/5ML suspension   Commonly known as:  MILK OF MAGNESIA   Dose:  30 mL   Quantity:  105 mL        Take 30 mLs by mouth daily as needed for constipation or heartburn   Refills:  0        oxyCODONE IR 5 MG tablet   Commonly known as:  ROXICODONE   Dose:  2.5 mg   Quantity:  5 tablet        Take 0.5 tablets (2.5 mg) by mouth 4 times daily Hold for sedation   Refills:  0        pantoprazole 2 mg/mL Susp suspension   Commonly known as:  PROTONIX   Dose:  40 mg        Take 20 mLs (40 mg) by mouth every morning   Refills:  0        polyethylene glycol Packet   Commonly known as:  MIRALAX/GLYCOLAX   Dose:  17 g   Quantity:  7 packet        Take 17 g by mouth daily Hold for loose stools   Refills:  0        senna-docusate 8.6-50 MG per tablet   Commonly known as:   SENOKOT-S;PERICOLACE   Dose:  2 tablet        Take 2 tablets by mouth daily Hold for loose stools   Refills:  0        * Notice:  This list has 2 medication(s) that are the same as other medications prescribed for you. Read the directions carefully, and ask your doctor or other care provider to review them with you.            Prescriptions were sent or printed at these locations (2 Prescriptions)                   Other Prescriptions                Printed at Department/Unit printer (2 of 2)         metroNIDAZOLE (FLAGYL) 500 MG tablet               sulfamethoxazole-trimethoprim (BACTRIM DS) 800-160 MG per tablet                Procedures and tests performed during your visit     Abd/pelvis CT no contrast - Stone Protocol    Basic metabolic panel    CBC with platelets differential    Ortiz catheter    INR    Peripheral IV: Standard    UA with Microscopic      Orders Needing Specimen Collection     None      Pending Results     No orders found from 7/8/2018 to 7/11/2018.            Pending Culture Results     No orders found from 7/8/2018 to 7/11/2018.            Pending Results Instructions     If you had any lab results that were not finalized at the time of your Discharge, you can call the ED Lab Result RN at [phone]. You will be contacted by this team for any positive Lab results or changes in treatment. The nurses are available 7 days a week from 10A to 6:30P.  You can leave a message 24 hours per day and they will return your call.        Test Results From Your Hospital Stay        7/10/2018 12:36 PM      Component Results     Component Value Ref Range & Units Status    WBC 10.6 4.0 - 11.0 10e9/L Final    RBC Count 3.42 (L) 4.4 - 5.9 10e12/L Final    Hemoglobin 10.3 (L) 13.3 - 17.7 g/dL Final    Hematocrit 33.1 (L) 40.0 - 53.0 % Final    MCV 97 78 - 100 fl Final    MCH 30.1 26.5 - 33.0 pg Final    MCHC 31.1 (L) 31.5 - 36.5 g/dL Final    RDW 14.8 10.0 - 15.0 % Final    Platelet Count 285 150 - 450 10e9/L  Final    Diff Method Automated Method  Final    % Neutrophils 75.2 % Final    % Lymphocytes 14.2 % Final    % Monocytes 9.8 % Final    % Eosinophils 0.0 % Final    % Basophils 0.5 % Final    % Immature Granulocytes 0.3 % Final    Nucleated RBCs 0 0 /100 Final    Absolute Neutrophil 7.9 1.6 - 8.3 10e9/L Final    Absolute Lymphocytes 1.5 0.8 - 5.3 10e9/L Final    Absolute Monocytes 1.0 0.0 - 1.3 10e9/L Final    Absolute Eosinophils 0.0 0.0 - 0.7 10e9/L Final    Absolute Basophils 0.1 0.0 - 0.2 10e9/L Final    Abs Immature Granulocytes 0.0 0 - 0.4 10e9/L Final    Absolute Nucleated RBC 0.0  Final         7/10/2018 12:45 PM      Component Results     Component Value Ref Range & Units Status    INR 1.09 0.86 - 1.14 Final         7/10/2018 12:51 PM      Component Results     Component Value Ref Range & Units Status    Sodium 137 133 - 144 mmol/L Final    Potassium 4.3 3.4 - 5.3 mmol/L Final    Chloride 108 94 - 109 mmol/L Final    Carbon Dioxide 22 20 - 32 mmol/L Final    Anion Gap 7 3 - 14 mmol/L Final    Glucose 123 (H) 70 - 99 mg/dL Final    Urea Nitrogen 28 7 - 30 mg/dL Final    Creatinine 1.79 (H) 0.66 - 1.25 mg/dL Final    GFR Estimate 36 (L) >60 mL/min/1.7m2 Final    Non  GFR Calc    GFR Estimate If Black 44 (L) >60 mL/min/1.7m2 Final    African American GFR Calc    Calcium 10.5 (H) 8.5 - 10.1 mg/dL Final         7/10/2018  1:41 PM      Component Results     Component Value Ref Range & Units Status    Color Urine Yellow  Final    Appearance Urine Slightly Cloudy  Final    Glucose Urine Negative NEG^Negative mg/dL Final    Bilirubin Urine Negative NEG^Negative Final    Ketones Urine Negative NEG^Negative mg/dL Final    Specific Gravity Urine 1.013 1.003 - 1.035 Final    Blood Urine Small (A) NEG^Negative Final    pH Urine 7.0 5.0 - 7.0 pH Final    Protein Albumin Urine >499 (A) NEG^Negative mg/dL Final    Reviewed, acceptable    Urobilinogen mg/dL 0.0 0.0 - 2.0 mg/dL Final    Nitrite Urine Negative  NEG^Negative Final    Leukocyte Esterase Urine Large (A) NEG^Negative Final    Source Catheterized Urine  Final    WBC Urine 89 (H) 0 - 5 /HPF Final    RBC Urine 24 (H) 0 - 2 /HPF Final    WBC Clumps Present (A) NEG^Negative /HPF Final    Bacteria Urine Few (A) NEG^Negative /HPF Final    Mucous Urine Present (A) NEG^Negative /LPF Final         7/10/2018  1:47 PM      Narrative     CT ABDOMEN AND PELVIS WITHOUT CONTRAST  7/10/2018 12:50 PM     HISTORY: Abdominal pain. Ortiz catheter not draining.    TECHNIQUE: Volumetric helical sections were acquired from the lung  bases through the ischial tuberosities without IV contrast. Coronal  images were also reconstructed. Radiation dose for this scan was  reduced using automated exposure control, adjustment of the mA and/or  kV according to patient size, or iterative reconstruction technique.    COMPARISON: CT of the abdomen and pelvis performed 10/20/2017.    FINDINGS:  A large bladder stone is not significantly changed,  measuring 3.7 x 2.2 cm. A few smaller bladder stones are also noted.  The urinary bladder is moderately distended. There is mild prostatic  enlargement and central calcification. Nonobstructing stone or cluster  of stones in the lower pole of the right kidney measures 1.2 cm. There  are two left renal cysts noted, with the largest in the upper pole  measuring 2.4 cm. There is mild bilateral hydronephrosis, possibly  related to the bladder distention.    The liver, gallbladder, spleen, adrenal glands, and pancreas have  unremarkable noncontrast appearances. Moderate atherosclerotic  aortoiliac calcification. Ectasia of the infrarenal abdominal aorta  measures up to 2.9 cm AP. No bowel obstruction. The appendix is not  seen, consistent with history of prior appendectomy. Colonic  diverticulosis. Mild bowel wall thickening with mild surrounding fat  stranding in the distal descending/proximal sigmoid colon, suspicious  for a mild diverticulitis. No  associated fluid collection to suggest  diverticular abscess. No free fluid in the pelvis. Coronary artery  calcification. Cardiac enlargement. Mild scattered scarring and/or  fibrosis at both lung bases. Degenerative changes are noted throughout  the visualized thoracolumbar spine and within both hips.         Impression     IMPRESSION:   1. Findings are suspicious for a mild diverticulitis in the distal  descending/proximal sigmoid colon. No evidence for associated abscess.  2. There is moderate bladder distention and mild bilateral  hydronephrosis.  3. There are several bladder stones noted, as well as a nonobstructing  stone in the lower pole of the right kidney. These stones do not  appear to cause the bladder distention and mild bilateral  hydronephrosis.  4. Mild prostatic enlargement.    MARGARITO LYONS MD                Clinical Quality Measure: Blood Pressure Screening     Your blood pressure was checked while you were in the emergency department today. The last reading we obtained was  BP: 156/88 . Please read the guidelines below about what these numbers mean and what you should do about them.  If your systolic blood pressure (the top number) is less than 120 and your diastolic blood pressure (the bottom number) is less than 80, then your blood pressure is normal. There is nothing more that you need to do about it.  If your systolic blood pressure (the top number) is 120-139 or your diastolic blood pressure (the bottom number) is 80-89, your blood pressure may be higher than it should be. You should have your blood pressure rechecked within a year by a primary care provider.  If your systolic blood pressure (the top number) is 140 or greater or your diastolic blood pressure (the bottom number) is 90 or greater, you may have high blood pressure. High blood pressure is treatable, but if left untreated over time it can put you at risk for heart attack, stroke, or kidney failure. You should have your blood  "pressure rechecked by a primary care provider within the next 4 weeks.  If your provider in the emergency department today gave you specific instructions to follow-up with your doctor or provider even sooner than that, you should follow that instruction and not wait for up to 4 weeks for your follow-up visit.        Thank you for choosing [facility]       Thank you for choosing [facility] for your care. Our goal is always to provide you with excellent care. Hearing back from our patients is one way we can continue to improve our services. Please take a few minutes to complete the written survey that you may receive in the mail after you visit with us. Thank you!        [email or web address] Information     [employer] lets you send messages to your doctor, view your test results, renew your prescriptions, schedule appointments and more. To sign up, go to [email or web address] . Click on \"Log in\" on the left side of the screen, which will take you to the Welcome page. Then click on \"Sign up Now\" on the right side of the page.     You will be asked to enter the access code listed below, as well as some personal information. Please follow the directions to create your username and password.     Your access code is: [email or web address]  Expires: 2018  4:11 PM     Your access code will  in 90 days. If you need help or a new code, please call your [facility] clinic or [phone].        Care EveryWhere ID     This is your Care EveryWhere ID. This could be used by other organizations to access your [facility] medical records  [id number]        Equal Access to Services     WILLIAM ACHARYA : Hadii mayra bo hadasho Soheatherali, waaxda luqadaha, qaybta kaalmada adeegyada, arianna christensen . So [facility] [phone].    ATENCIÓN: Si habla español, tiene a jeffery disposición servicios gratuitos de asistencia lingüística. Llame al [phone].    We comply with applicable federal civil rights laws and [place] laws. We do not " discriminate on the basis of race, color, national origin, age, disability, sex, sexual orientation, or gender identity.            After Visit Summary       This is your record. Keep this with you and show to your community pharmacist(s) and doctor(s) at your next visit.

## 2018-07-10 NOTE — ED TRIAGE NOTES
Pt arrives to ed via ems from [facility] after his rutledge was not draining. Staff unable to replace it and pt reports pain to penis.

## 2018-07-10 NOTE — ED PROVIDER NOTES
Patient changed over from Dr. Aguayo pending IV antibiotics prior to discharge for diverticulitis.  I was called by pharmacy who recommended against levofloxacin as the patient is on amiodarone and may prolong the QT.  Levaquin thus discontinued as well as outpatient ciprofloxacin.  Patient does have a true allergy to penicillin.  I will opt for IV Flagyl and ceftriaxone in the emergency department.  He will be discharged home on oral Flagyl and Bactrim.  Although Bactrim not an ideal agent in the elderly, is the most reasonable option given the QT prolongation with fluoroquinolones and history of true penicillin allergy.  Patient will be discharged home after IV antibiotics in the ED.     Juan Duran MD  07/10/18 [id number]

## 2018-07-10 NOTE — ED AVS SNAPSHOT
[facility] EMERGENCY DEPARTMENT: [phone]                                              INTERAGENCY TRANSFER FORM - PHYSICIAN ORDERS   7/10/2018                    Hospital Admission Date: 7/10/2018  KEELY GUILLEN   : 1931  Sex: Male        Attending Provider: (none)    Allergies:  Lisinopril, Penicillins    Infection:  None   Service:  EMERGENCY ME    Ht:  --   Wt:  --   Admission Wt:  --    BMI:  --   BSA:  --            Patient PCP Information     Provider PCP Type    Shirley Toscano MD General      ED Clinical Impression     Diagnosis Description Comment Added By Time Added    Abdominal pain, generalized [R10.84] Abdominal pain, generalized [R10.84]  Jj Aguayo MD 7/10/2018  1:41 PM    Diverticulitis of colon [K57.32] Diverticulitis of colon [K57.32]  Jj Aguayo MD 7/10/2018  1:41 PM    Urinary retention [R33.9] Urinary retention [R33.9]  Jj Aguayo MD 7/10/2018  1:41 PM      Hospital Problems as of 7/10/2018     None      Non-Hospital Problems as of 7/10/2018              Priority Class Noted    ACP (advance care planning) Medium  2014    Bradycardia Medium  2017    Renal failure Medium  2017      Code Status History     Date Active Date Inactive Code Status Order ID Comments User Context    2017 10:16 AM  DNR/DNI [id number]  Stanislaw Franco MD Outpatient    2017  2:04 PM 2017 10:16 AM DNR/DNI [id number]  Oxana Garza APRN CNS Inpatient    2017  6:16 PM 2017  2:04 PM Special Code [id number] Parameters:  DNR/DNI. No ICU. No pressors. IV and Oral meds to help with pt cardiac rhythm ok. See palliative care note 17 Oxana Garza APRN CNS Inpatient    2017  6:15 PM 2017  6:16 PM Special Code [id number] Parameters:  DNR/DNI. No ICU. No pressors. IV and Oral meds to help with pt cardiac rhythm ok. Oxana Garza APRN CNS Inpatient    2017 12:10 PM 2017  6:15 PM DNR/DNI  [id number]  Kat Quiroga PA-C Inpatient         Medication Review      UNREVIEWED medications. Ask your doctor about these medications        Dose / Directions Comments    acetaminophen 500 MG tablet   Commonly known as:  TYLENOL   Used for:  Other chronic pain        Dose:  1000 mg   Take 2 tablets (1,000 mg) by mouth 3 times daily 0800, 1400, 2200   Quantity:  1 Bottle   Refills:  0        amiodarone 200 MG tablet   Commonly known as:  PACERONE/CODARONE   Used for:  Bradycardia        Dose:  200 mg   Take 1 tablet (200 mg) by mouth daily   Quantity:  30 tablet   Refills:  0        amLODIPine 10 MG tablet   Commonly known as:  NORVASC   Used for:  Bradycardia        Dose:  10 mg   Take 1 tablet (10 mg) by mouth daily   Quantity:  60 tablet   Refills:  0        bisacodyl 10 MG Suppository   Commonly known as:  DULCOLAX   Used for:  Other chronic pain        Dose:  10 mg   Place 1 suppository (10 mg) rectally daily as needed for constipation   Quantity:  30 suppository   Refills:  0        calcium carbonate 500 MG chewable tablet   Commonly known as:  TUMS   Used for:  Gastroesophageal reflux disease, esophagitis presence not specified        Dose:  1 chew tab   Take 1 tablet (500 mg) by mouth 2 times daily as needed for heartburn   Refills:  0        diclofenac 1 % Gel topical gel   Commonly known as:  VOLTAREN   Used for:  Other chronic pain        Apply  2 grams to R antecubital and L wrist old IV sites four times daily using enclosed dosing card.   Quantity:  100 g   Refills:  0        hydrALAZINE 25 MG tablet   Commonly known as:  APRESOLINE   Used for:  Other chronic pain        Dose:  25 mg   Take 1 tablet (25 mg) by mouth every 8 hours   Quantity:  90 tablet   Refills:  0    HTN       * LORazepam 2 MG/ML (HIGH CONC) solution   Commonly known as:  LORazepam INTENSOL   Used for:  Agitation        Dose:  0.25-0.5 mg   Take 0.13-0.25 mLs (0.26-0.5 mg) by mouth 2 times daily See also prn dosing   Quantity:   30 mL   Refills:  0        * LORazepam 2 MG/ML (HIGH CONC) solution   Commonly known as:  ATIVAN   Used for:  Agitation        Dose:  0.25 mg   Take 0.13 mLs (0.26 mg) by mouth every 8 hours as needed See also scheduled doses. Hold for sedation.   Refills:  0        magnesium hydroxide 400 MG/5ML suspension   Commonly known as:  MILK OF MAGNESIA   Used for:  Drug-induced constipation        Dose:  30 mL   Take 30 mLs by mouth daily as needed for constipation or heartburn   Quantity:  105 mL   Refills:  0        oxyCODONE IR 5 MG tablet   Commonly known as:  ROXICODONE   Used for:  Other chronic pain        Dose:  2.5 mg   Take 0.5 tablets (2.5 mg) by mouth 4 times daily Hold for sedation   Quantity:  5 tablet   Refills:  0    Hold for sedation       pantoprazole 2 mg/mL Susp suspension   Commonly known as:  PROTONIX   Used for:  Gastroesophageal reflux disease, esophagitis presence not specified        Dose:  40 mg   Take 20 mLs (40 mg) by mouth every morning   Refills:  0        polyethylene glycol Packet   Commonly known as:  MIRALAX/GLYCOLAX   Used for:  Drug-induced constipation        Dose:  17 g   Take 17 g by mouth daily Hold for loose stools   Quantity:  7 packet   Refills:  0        senna-docusate 8.6-50 MG per tablet   Commonly known as:  SENOKOT-S;PERICOLACE   Used for:  Drug-induced constipation        Dose:  2 tablet   Take 2 tablets by mouth daily Hold for loose stools   Refills:  0        * Notice:  This list has 2 medication(s) that are the same as other medications prescribed for you. Read the directions carefully, and ask your doctor or other care provider to review them with you.      START taking        Dose / Directions Comments    metroNIDAZOLE 500 MG tablet   Commonly known as:  FLAGYL        Dose:  500 mg   Take 1 tablet (500 mg) by mouth 3 times daily for 10 days   Quantity:  30 tablet   Refills:  0        sulfamethoxazole-trimethoprim 800-160 MG per tablet   Commonly known as:  BACTRIM DS         Dose:  1 tablet   Take 1 tablet by mouth 2 times daily for 10 days   Quantity:  20 tablet   Refills:  0                  Further instructions from your care team       Discharge Instructions  Abdominal Pain    Abdominal pain can be caused by many things. Your evaluation today does not show the exact cause for your pain. Your doctor today has decided that it is unlikely your pain is due to a life threatening problem, or a problem requiring surgery or hospital admission. Sometimes those problems cannot be found right away, so it is very important that you follow up as directed.  Sometimes only the changes which occur over time allow the cause of your pain to be found.    Return to the Emergency Department for a recheck in 8-12 hours if your pain continues.  If your pain gets worse, changes in location, or feels different, return to the Emergency Department right away.    ADULTS:  Return to the Emergency Department right away if:      You get an oral temperature above 102oF or as directed by your doctor.    You have blood in your stools (bright red or black, tarry stools).    You keep throwing up or can t drink liquids.    You see blood when you throw up.    You can t have a bowel movement or you can t pass gas.    Your stomach gets bloated or bigger.    Your skin or the whites of your eyes look yellow.    You faint.    You have bloody, frequent or painful urination.    You have new symptoms or anything that worries you.    CHILDREN:  Return to the Emergency Department right away if your child has any of the above-listed symptoms or the following:      Pushes your hand away or screams/cries when his/her belly is touched.    You notice your child is very fussy or weak.    Your child is very tired and is too tired to eat or drink.    Your child is dehydrated.  Signs of dehydration can be:  o Your infant has had no wet diapers in 4-5 hours.  o Your older child has not passed urine in 6-8 hours.  o Your infant or  child starts to have dry mouth and lips, or no saliva or tears.    PREGNANT WOMEN:  Return to the Emergency Department right away if you have any of the above-listed symptoms or the following:      You have bleeding, leaking fluid or passing tissue from the vagina.    You have worse pain or cramping, or pain in your shoulder or back.    You have vomiting that will not stop.    You have painful or bloody urination.    You have a temperature of 100oF or more.    Your baby is not moving as much as usual.    You faint.    You get a bad headache with or without eye problems and abdominal pain.    You have a convulsion or seizure.    You have unusual discharge from your vagina and abdominal pain.    Abdominal pain is pretty common during pregnancy.  Your pain may or may not be related to your pregnancy. You should follow-up closely with your OB doctor so they can evaluate you and your baby.  Until you follow-up with your regular doctor, do the following:       Avoid sex and do not put anything in your vagina.    Drink clear fluids.    Only take medications approved by your doctor.    MORE INFORMATION:    Appendicitis:  A possible cause of abdominal pain in any person who still has their appendix is acute appendicitis. Appendicitis is often hard to diagnose.  Testing does not always rule out early appendicitis or other causes of abdominal pain. Close follow-up with your doctor and re-evaluations may be needed to figure out the reason for your abdominal pain.    Follow-up:  It is very important that you make an appointment with your clinic and go to the appointment.  If you do not follow-up with your primary doctor, it may result in missing an important development which could result in permanent injury or disability and/or lasting pain.  If there is any problem keeping your appointment, call your doctor or return to the Emergency Department.    Medications:  Take your medications as directed by your doctor today.  Before  "using over-the-counter medications, ask your doctor and make sure to take the medications as directed.  If you have any questions about medications, ask your doctor.    Diet:  Resume your normal diet as much as possible, but do not eat fried, fatty or spicy foods while you have pain.  Do not drink alcohol or have caffeine.  Do not smoke tobacco.    Probiotics: If you have been given an antibiotic, you may want to also take a probiotic pill or eat yogurt with live cultures. Probiotics have \"good bacteria\" to help your intestines stay healthy. Studies have shown that probiotics help prevent diarrhea and other intestine problems (including C. diff infection) when you take antibiotics. You can buy these without a prescription in the pharmacy section of the store.     If you were given a prescription for medicine here today, be sure to read all of the information (including the package insert) that comes with your prescription.  This will include important information about the medicine, its side effects, and any warnings that you need to know about.  The pharmacist who fills the prescription can provide more information and answer questions you may have about the medicine.  If you have questions or concerns that the pharmacist cannot address, please call or return to the Emergency Department.         Opioid Medication Information    Pain medications are among the most commonly prescribed medicines, so we are including this information for all our patients. If you did not receive pain medication or get a prescription for pain medicine, you can ignore it.     You may have been given a prescription for an opioid (narcotic) pain medicine and/or have received a pain medicine while here in the Emergency Department. These medicines can make you drowsy or impaired. You must not drive, operate dangerous equipment, or engage in any other dangerous activities while taking these medications. If you drive while taking these " medications, you could be arrested for DUI, or driving under the influence. Do not drink any alcohol while you are taking these medications.     Opioid pain medications can cause addiction. If you have a history of chemical dependency of any type, you are at a higher risk of becoming addicted to pain medications.  Only take these prescribed medications to treat your pain when all other options have been tried. Take it for as short a time and as few doses as possible. Store your pain pills in a secure place, as they are frequently stolen and provide a dangerous opportunity for children or visitors in your house to start abusing these powerful medications. We will not replace any lost or stolen medicine.  As soon as your pain is better, you should flush all your remaining medication.     Many prescription pain medications contain Tylenol  (acetaminophen), including Vicodin , Tylenol #3 , Norco , Lortab , and Percocet .  You should not take any extra pills of Tylenol  if you are using these prescription medications or you can get very sick.  Do not ever take more than 3000 mg of acetaminophen in any 24 hour period.    All opioids tend to cause constipation. Drink plenty of water and eat foods that have a lot of fiber, such as fruits, vegetables, prune juice, apple juice and high fiber cereal.  Take a laxative if you don t move your bowels at least every other day. Miralax , Milk of Magnesia, Colace , or Senna  can be used to keep you regular.      Remember that you can always come back to the Emergency Department if you are not able to see your regular doctor in the amount of time listed above, if you get any new symptoms, or if there is anything that worries you.

## 2018-07-20 ENCOUNTER — DOCUMENTATION ONLY (OUTPATIENT)
Dept: OTHER | Facility: CLINIC | Age: 83
End: 2018-07-20

## 2018-07-30 ENCOUNTER — RECORDS - HEALTHEAST (OUTPATIENT)
Dept: LAB | Facility: CLINIC | Age: 83
End: 2018-07-30

## 2018-07-31 LAB
T4 FREE SERPL-MCNC: 0.6 NG/DL (ref 0.7–1.8)
TSH SERPL DL<=0.005 MIU/L-ACNC: 78.03 UIU/ML (ref 0.3–5)

## 2018-09-17 ENCOUNTER — RECORDS - HEALTHEAST (OUTPATIENT)
Dept: LAB | Facility: CLINIC | Age: 83
End: 2018-09-17

## 2018-09-18 LAB
T3 SERPL-MCNC: 61 NG/DL (ref 45–175)
T4 FREE SERPL-MCNC: 0.7 NG/DL (ref 0.7–1.8)
TSH SERPL DL<=0.005 MIU/L-ACNC: 41.84 UIU/ML (ref 0.3–5)

## 2018-09-27 ENCOUNTER — HOSPITAL ENCOUNTER (OUTPATIENT)
Facility: CLINIC | Age: 83
Setting detail: OBSERVATION
Discharge: HOME | End: 2018-09-28
Attending: EMERGENCY MEDICINE | Admitting: INTERNAL MEDICINE
Payer: COMMERCIAL

## 2018-09-27 ENCOUNTER — APPOINTMENT (OUTPATIENT)
Dept: GENERAL RADIOLOGY | Facility: CLINIC | Age: 83
End: 2018-09-27
Attending: EMERGENCY MEDICINE
Payer: COMMERCIAL

## 2018-09-27 DIAGNOSIS — Z46.6 URINARY CATHETER CHANGE REQUIRED: ICD-10-CM

## 2018-09-27 DIAGNOSIS — R09.02 HYPOXEMIA: ICD-10-CM

## 2018-09-27 DIAGNOSIS — G89.29 OTHER CHRONIC PAIN: ICD-10-CM

## 2018-09-27 LAB
ALBUMIN UR-MCNC: 100 MG/DL
ANION GAP SERPL CALCULATED.3IONS-SCNC: 9 MMOL/L (ref 3–14)
APPEARANCE UR: ABNORMAL
BACTERIA #/AREA URNS HPF: ABNORMAL /HPF
BASOPHILS # BLD AUTO: 0 10E9/L (ref 0–0.2)
BASOPHILS NFR BLD AUTO: 0.3 %
BILIRUB UR QL STRIP: NEGATIVE
BUN SERPL-MCNC: 30 MG/DL (ref 7–30)
CALCIUM SERPL-MCNC: 10.2 MG/DL (ref 8.5–10.1)
CHLORIDE SERPL-SCNC: 110 MMOL/L (ref 94–109)
CO2 SERPL-SCNC: 20 MMOL/L (ref 20–32)
COLOR UR AUTO: YELLOW
CREAT SERPL-MCNC: 1.8 MG/DL (ref 0.66–1.25)
D DIMER PPP FEU-MCNC: 0.6 UG/ML FEU (ref 0–0.5)
DIFFERENTIAL METHOD BLD: ABNORMAL
EOSINOPHIL # BLD AUTO: 0 10E9/L (ref 0–0.7)
EOSINOPHIL NFR BLD AUTO: 0 %
ERYTHROCYTE [DISTWIDTH] IN BLOOD BY AUTOMATED COUNT: 14.9 % (ref 10–15)
GFR SERPL CREATININE-BSD FRML MDRD: 36 ML/MIN/1.7M2
GLUCOSE SERPL-MCNC: 121 MG/DL (ref 70–99)
GLUCOSE UR STRIP-MCNC: NEGATIVE MG/DL
HCT VFR BLD AUTO: 32.3 % (ref 40–53)
HGB BLD-MCNC: 10.2 G/DL (ref 13.3–17.7)
HGB UR QL STRIP: ABNORMAL
IMM GRANULOCYTES # BLD: 0 10E9/L (ref 0–0.4)
IMM GRANULOCYTES NFR BLD: 0.2 %
INTERPRETATION ECG - MUSE: NORMAL
KETONES UR STRIP-MCNC: NEGATIVE MG/DL
LEUKOCYTE ESTERASE UR QL STRIP: ABNORMAL
LYMPHOCYTES # BLD AUTO: 2.2 10E9/L (ref 0.8–5.3)
LYMPHOCYTES NFR BLD AUTO: 18.3 %
MCH RBC QN AUTO: 29.7 PG (ref 26.5–33)
MCHC RBC AUTO-ENTMCNC: 31.6 G/DL (ref 31.5–36.5)
MCV RBC AUTO: 94 FL (ref 78–100)
MONOCYTES # BLD AUTO: 1.5 10E9/L (ref 0–1.3)
MONOCYTES NFR BLD AUTO: 12.2 %
NEUTROPHILS # BLD AUTO: 8.3 10E9/L (ref 1.6–8.3)
NEUTROPHILS NFR BLD AUTO: 69 %
NITRATE UR QL: NEGATIVE
NRBC # BLD AUTO: 0 10*3/UL
NRBC BLD AUTO-RTO: 0 /100
PH UR STRIP: 7 PH (ref 5–7)
PLATELET # BLD AUTO: 267 10E9/L (ref 150–450)
POTASSIUM SERPL-SCNC: 4.4 MMOL/L (ref 3.4–5.3)
RBC # BLD AUTO: 3.44 10E12/L (ref 4.4–5.9)
RBC #/AREA URNS AUTO: 49 /HPF (ref 0–2)
SODIUM SERPL-SCNC: 139 MMOL/L (ref 133–144)
SOURCE: ABNORMAL
SP GR UR STRIP: 1.01 (ref 1–1.03)
TROPONIN I SERPL-MCNC: 0.02 UG/L (ref 0–0.04)
UROBILINOGEN UR STRIP-MCNC: 0 MG/DL (ref 0–2)
WBC # BLD AUTO: 12 10E9/L (ref 4–11)
WBC #/AREA URNS AUTO: 46 /HPF (ref 0–5)

## 2018-09-27 PROCEDURE — 25000132 ZZH RX MED GY IP 250 OP 250 PS 637: Performed by: INTERNAL MEDICINE

## 2018-09-27 PROCEDURE — 51702 INSERT TEMP BLADDER CATH: CPT

## 2018-09-27 PROCEDURE — 99285 EMERGENCY DEPT VISIT HI MDM: CPT | Mod: 25

## 2018-09-27 PROCEDURE — 85379 FIBRIN DEGRADATION QUANT: CPT | Performed by: EMERGENCY MEDICINE

## 2018-09-27 PROCEDURE — 94640 AIRWAY INHALATION TREATMENT: CPT

## 2018-09-27 PROCEDURE — 99207 ZZC CDG-MDM COMPONENT: MEETS MODERATE - UP CODED: CPT | Performed by: INTERNAL MEDICINE

## 2018-09-27 PROCEDURE — G0378 HOSPITAL OBSERVATION PER HR: HCPCS

## 2018-09-27 PROCEDURE — 25000125 ZZHC RX 250

## 2018-09-27 PROCEDURE — 87088 URINE BACTERIA CULTURE: CPT | Performed by: EMERGENCY MEDICINE

## 2018-09-27 PROCEDURE — 87186 SC STD MICRODIL/AGAR DIL: CPT | Performed by: EMERGENCY MEDICINE

## 2018-09-27 PROCEDURE — 85379 FIBRIN DEGRADATION QUANT: CPT | Performed by: INTERNAL MEDICINE

## 2018-09-27 PROCEDURE — 93005 ELECTROCARDIOGRAM TRACING: CPT | Mod: XS

## 2018-09-27 PROCEDURE — 85025 COMPLETE CBC W/AUTO DIFF WBC: CPT | Performed by: EMERGENCY MEDICINE

## 2018-09-27 PROCEDURE — 80048 BASIC METABOLIC PNL TOTAL CA: CPT | Performed by: EMERGENCY MEDICINE

## 2018-09-27 PROCEDURE — 25000125 ZZHC RX 250: Performed by: EMERGENCY MEDICINE

## 2018-09-27 PROCEDURE — 81001 URINALYSIS AUTO W/SCOPE: CPT | Performed by: EMERGENCY MEDICINE

## 2018-09-27 PROCEDURE — 25000132 ZZH RX MED GY IP 250 OP 250 PS 637: Performed by: PHYSICIAN ASSISTANT

## 2018-09-27 PROCEDURE — 99220 ZZC INITIAL OBSERVATION CARE,LEVL III: CPT | Performed by: INTERNAL MEDICINE

## 2018-09-27 PROCEDURE — 71046 X-RAY EXAM CHEST 2 VIEWS: CPT

## 2018-09-27 PROCEDURE — 84484 ASSAY OF TROPONIN QUANT: CPT | Performed by: EMERGENCY MEDICINE

## 2018-09-27 PROCEDURE — 87086 URINE CULTURE/COLONY COUNT: CPT | Performed by: EMERGENCY MEDICINE

## 2018-09-27 RX ORDER — ONDANSETRON 4 MG/1
4 TABLET, ORALLY DISINTEGRATING ORAL EVERY 6 HOURS PRN
Status: DISCONTINUED | OUTPATIENT
Start: 2018-09-27 | End: 2018-09-27

## 2018-09-27 RX ORDER — PREDNISONE 20 MG/1
60 TABLET ORAL ONCE
Status: COMPLETED | OUTPATIENT
Start: 2018-09-27 | End: 2018-09-27

## 2018-09-27 RX ORDER — NALOXONE HYDROCHLORIDE 0.4 MG/ML
.1-.4 INJECTION, SOLUTION INTRAMUSCULAR; INTRAVENOUS; SUBCUTANEOUS
Status: DISCONTINUED | OUTPATIENT
Start: 2018-09-27 | End: 2018-09-27

## 2018-09-27 RX ORDER — AMOXICILLIN 250 MG
2 CAPSULE ORAL 2 TIMES DAILY PRN
Status: DISCONTINUED | OUTPATIENT
Start: 2018-09-27 | End: 2018-09-28 | Stop reason: HOSPADM

## 2018-09-27 RX ORDER — AMLODIPINE BESYLATE 10 MG/1
10 TABLET ORAL DAILY
Status: DISCONTINUED | OUTPATIENT
Start: 2018-09-27 | End: 2018-09-28 | Stop reason: HOSPADM

## 2018-09-27 RX ORDER — ONDANSETRON 2 MG/ML
4 INJECTION INTRAMUSCULAR; INTRAVENOUS EVERY 6 HOURS PRN
Status: DISCONTINUED | OUTPATIENT
Start: 2018-09-27 | End: 2018-09-27

## 2018-09-27 RX ORDER — NALOXONE HYDROCHLORIDE 0.4 MG/ML
.1-.4 INJECTION, SOLUTION INTRAMUSCULAR; INTRAVENOUS; SUBCUTANEOUS
Status: DISCONTINUED | OUTPATIENT
Start: 2018-09-27 | End: 2018-09-28 | Stop reason: HOSPADM

## 2018-09-27 RX ORDER — NITROGLYCERIN 0.4 MG/1
0.4 TABLET SUBLINGUAL EVERY 5 MIN PRN
Status: DISCONTINUED | OUTPATIENT
Start: 2018-09-27 | End: 2018-09-28 | Stop reason: HOSPADM

## 2018-09-27 RX ORDER — ACETAMINOPHEN 650 MG/1
650 SUPPOSITORY RECTAL EVERY 4 HOURS PRN
Status: DISCONTINUED | OUTPATIENT
Start: 2018-09-27 | End: 2018-09-27

## 2018-09-27 RX ORDER — LEVOTHYROXINE SODIUM 50 UG/1
50 TABLET ORAL DAILY
COMMUNITY

## 2018-09-27 RX ORDER — ACETAMINOPHEN 500 MG
1000 TABLET ORAL 3 TIMES DAILY
Status: DISCONTINUED | OUTPATIENT
Start: 2018-09-27 | End: 2018-09-28 | Stop reason: HOSPADM

## 2018-09-27 RX ORDER — ACETAMINOPHEN 650 MG/1
650 SUPPOSITORY RECTAL EVERY 4 HOURS PRN
Status: DISCONTINUED | OUTPATIENT
Start: 2018-09-27 | End: 2018-09-28 | Stop reason: HOSPADM

## 2018-09-27 RX ORDER — AMOXICILLIN 250 MG
1 CAPSULE ORAL 2 TIMES DAILY PRN
Status: DISCONTINUED | OUTPATIENT
Start: 2018-09-27 | End: 2018-09-28 | Stop reason: HOSPADM

## 2018-09-27 RX ORDER — IPRATROPIUM BROMIDE AND ALBUTEROL SULFATE 2.5; .5 MG/3ML; MG/3ML
SOLUTION RESPIRATORY (INHALATION)
Status: COMPLETED
Start: 2018-09-27 | End: 2018-09-27

## 2018-09-27 RX ORDER — LIDOCAINE 40 MG/G
CREAM TOPICAL
Status: DISCONTINUED | OUTPATIENT
Start: 2018-09-27 | End: 2018-09-27

## 2018-09-27 RX ORDER — IPRATROPIUM BROMIDE AND ALBUTEROL SULFATE 2.5; .5 MG/3ML; MG/3ML
6 SOLUTION RESPIRATORY (INHALATION) ONCE
Status: COMPLETED | OUTPATIENT
Start: 2018-09-27 | End: 2018-09-27

## 2018-09-27 RX ORDER — ACETAMINOPHEN 325 MG/1
650 TABLET ORAL EVERY 4 HOURS PRN
Status: DISCONTINUED | OUTPATIENT
Start: 2018-09-27 | End: 2018-09-28 | Stop reason: HOSPADM

## 2018-09-27 RX ORDER — ACETAMINOPHEN 325 MG/1
650 TABLET ORAL EVERY 4 HOURS PRN
Status: DISCONTINUED | OUTPATIENT
Start: 2018-09-27 | End: 2018-09-27

## 2018-09-27 RX ADMIN — IPRATROPIUM BROMIDE AND ALBUTEROL SULFATE 6 ML: .5; 3 SOLUTION RESPIRATORY (INHALATION) at 04:00

## 2018-09-27 RX ADMIN — PREDNISONE 60 MG: 20 TABLET ORAL at 04:22

## 2018-09-27 RX ADMIN — AMIODARONE HYDROCHLORIDE 100 MG: 200 TABLET ORAL at 11:15

## 2018-09-27 RX ADMIN — HYDRALAZINE HYDROCHLORIDE 12.5 MG: 25 TABLET ORAL at 16:30

## 2018-09-27 RX ADMIN — MICONAZOLE NITRATE: 2 POWDER TOPICAL at 13:21

## 2018-09-27 RX ADMIN — HYDRALAZINE HYDROCHLORIDE 12.5 MG: 25 TABLET ORAL at 23:58

## 2018-09-27 RX ADMIN — AMLODIPINE BESYLATE 10 MG: 10 TABLET ORAL at 11:15

## 2018-09-27 RX ADMIN — IPRATROPIUM BROMIDE AND ALBUTEROL SULFATE 6 ML: 2.5; .5 SOLUTION RESPIRATORY (INHALATION) at 04:00

## 2018-09-27 RX ADMIN — ACETAMINOPHEN 1000 MG: 500 TABLET, FILM COATED ORAL at 11:15

## 2018-09-27 RX ADMIN — ACETAMINOPHEN 1000 MG: 500 TABLET, FILM COATED ORAL at 19:34

## 2018-09-27 ASSESSMENT — ENCOUNTER SYMPTOMS
WHEEZING: 1
COUGH: 0
NAUSEA: 0
SHORTNESS OF BREATH: 0
DIARRHEA: 0
VOMITING: 0
ABDOMINAL PAIN: 1

## 2018-09-27 NOTE — H&P
[facility]  Hospitalist Admission Note  September 27, 2018  Name: Edil Lopez    MRN: [id number]  YOB: 1931    Age: 87 year old  Date of admission: 9/27/2018  Primary care provider: Shirley Toscano      Summary:  Patient is a pleasant 87-year-old gentleman with a history of dementia, tachybradycardia syndrome who was actually admitted last year in May 2017 with initial bradycardia felt to be secondary to metoprolol but does go into atrial fibrillation with RVR now on amiodarone, chronic kidney disease, history of chronic urinary retention with an indwelling Ortiz catheter that needs to be changed every 4 weeks, GERD, hypertension who initially presented here with urinary retention and no urine output through the Ortiz catheter and was successfully replaced in the ED with good urine output but will be admitted because patient had some hypoxemia requiring O2 supplementation in the ED.    Problem list/Plan:  1. Hypoxia: During my interview, I did take his O2 supplementation off and he did desat in the low to mid 80s when he is not breathing.  Comes up nicely when encouraged to take deep breaths.  Suspect more so hypoventilation and a component of sleep apnea. Do not suspect any significant cardiopulmonary disease.  Chest x-ray was negative.  Will check a d-dimer for completeness and if positive, would consider a VQ scan in the setting of chronic kidney disease  2. Chronic urinary retention: Does have chronic indwelling catheter.  This was the main reason for his initial ER presentation.  This has been replaced and Ortiz catheter is currently functioning well.  Some mild pyuria but no signs or symptoms of infection.  Hold off on antibiotics  3. Chronic kidney disease: Baseline creatinine appears to be around 1.8.  At baseline at this time.  Will monitor for now  4. Dementia: Currently in long-term care at [facility].  Currently pleasant.  Delirium  precautions.  5. Chronic pain syndrome: Apparently on scheduled oxycodone but will only offer this on a as needed basis while under observation  6. Tachybradycardia syndrome: Chronically on amiodarone.  Will continue amiodarone 200 mg p.o. daily.  7. Hypertension: Continue amlodipine and hydralazine.  8. History of GERD: Holding PPI while under observation.    Observation admission    All lab work and imaging data independently reviewed by myself    Prophylaxis;  Consider heparin subcu if patient transitions to inpatient status/Ambulation.     Code status: DNR/DNI as noted by palliative care consultations during previous hospitalizations    Discharge: Back to long-term care when able    Chief Complaint:   Catheter problem, noted to be hypoxic at times in the ED   HPI  Patient is a pleasant 87-year-old gentleman with a history of dementia, tachybradycardia syndrome who was actually admitted last year in May 2017 with initial bradycardia felt to be secondary to metoprolol but does go into atrial fibrillation with RVR now on amiodarone, history of chronic urinary retention with an indwelling Ortiz catheter that needs to be changed every 4 weeks, GERD, hypertension who initially presented here with urinary retention and no urine output through the Ortiz catheter.  Patient does not report any complaints per se and a Ortiz catheter was replaced in the ED with good urine output.  However, it was noted that the patient does desat at times to the mid to high 80s requiring O2 supplementation and thus was recommended to be admitted under observation to determine whether or not he will need supplemental O2 at discharge.  He otherwise denies any chest pain, cough, shortness of breath, abdominal pain, nausea, vomiting, or diarrhea.  I did discuss the case in detail with the ED physician.     Past Medical History:     Past Medical History:   Diagnosis Date     Anemia      BPH (benign prostatic hyperplasia)      Chronic kidney  disease      CVA (cerebral infarction)      Dementia      Gastro-oesophageal reflux disease      Hyperparathyroidism (H)      Hypertension      Hypothyroidism      Neuromuscular disorder (H)      PVD (peripheral vascular disease) (H)      Past Surgical History:     Past Surgical History:   Procedure Laterality Date     APPENDECTOMY OPEN       ENDARTERECTOMY CAROTID       HERNIA REPAIR       Suprapubic catheter placement       Social History:     Social History   Substance Use Topics     Smoking status: Former Smoker     Smokeless tobacco: Never Used     Alcohol use No     Family History:  Family history reviewed. NO pertinent family history     Allergies:     Allergies   Allergen Reactions     Lisinopril      Penicillins      Medications: To be reconciled       Review of Systems:   A Comprehensive greater than 10 system review of systems was carried out.  Pertinent positives and negatives are noted above.  Otherwise negative for contributory information.        Physical Exam:  Blood pressure 123/70, temperature 98.2  F (36.8  C), temperature source Oral, resp. rate 18, SpO2 94 %.  Gen: Pleasant, moderately confused but pleasant.  Otherwise, in no acute distress.  HEENT: NCAT. EOMI. PERRL.  Neck: Normal inspection. No bruit, JVD or thyromegaly.  Lungs: Normal respiratory effort. Clear to auscultation bilaterally with no crackles or wheezes.  Card: N s1s2. RRR. No M/R/G.  Peripheral pulses present and symmetric.   Abd: Soft NT ND. No mass. Normal bowel sounds.  Skin: No rash. Warm to the touch  Extr: No edema. CMS intact  Psychiatric: Patient alert and grossly oriented to self and place but not time.  Normal affect  Neurologic: Cranial nerves II-XII are intact. Motor strength 5/5    Data:       Recent Labs  Lab 09/27/18 0417   WBC 12.0*   HGB 10.2*   HCT 32.3*   MCV 94          Recent Labs  Lab 09/27/18 0417      POTASSIUM 4.4   CHLORIDE 110*   CO2 20   ANIONGAP 9   *   BUN 30   CR 1.80*    GFRESTIMATED 36*   GFRESTBLACK 43*   JENNA 10.2*       Recent Labs  Lab 09/27/18  0450   COLOR Yellow   APPEARANCE Slightly Cloudy   URINEGLC Negative   URINEBILI Negative   URINEKETONE Negative   SG 1.011   UBLD Moderate*   URINEPH 7.0   PROTEIN 100*   NITRITE Negative   LEUKEST Large*   RBCU 49*   WBCU 46*       Imaging:   Recent Results (from the past 24 hour(s))   Chest XR,  PA & LAT    Narrative    CHEST 2 VIEWS  9/27/2018 5:12 AM     HISTORY: Hypoxia. Wheezing.    COMPARISON: 5/22/2017.    FINDINGS: A linear opacity in the mid right lung likely represents  scarring or atelectasis. The lungs are otherwise clear. Mild  cardiomegaly. Atherosclerotic calcification in the thoracic aorta.      Impression    IMPRESSION: No evidence of active cardiopulmonary disease.    MD Lion ISBELL MD Pager [phone]

## 2018-09-27 NOTE — IP AVS SNAPSHOT
` ` Patient Information     Patient Name Sex     Edil Lopez ([id number]) Male 1931       Room Bed    [facility] 0225-01      Patient Demographics     Address Phone    [address]  [facility] [address] [phone] (Home)  None (Work)  [phone] (Mobile)      Patient Ethnicity & Race     Ethnic Group Patient Race    American White      Emergency Contact(s)     Name Relation Home Work Mobile    [employer] [place] (GUARDIAN) Other [phone] none none    Lee Lopez [phone] none [phone]    Sahil Kuo Relative [phone] none [phone]    Irene Long Relative [phone] none [phone]      Documents on File        Status Date Received Description       Documents for the Patient    Privacy Notice - Iraan Received 11     Insurance Card Received 17     External Medication Information Consent       Consent for Services - Hospital/Clinic Received () 12     Consent for EHR Access Received 14     [facility] Specified Other       Consent for Services - Hospital/Clinic Received () 14     Advance Directives and Living Will Received 14 POLST 10/10/2014-    Consent for Services/Privacy Notice - Hospital/Clinic Received () 10/28/16     Advance Directives and Living Will Received 17 Legal Guardianship 2015    Advance Directives and Living Will Received 17 POLST 2017    HIM KELLIE Authorization  17 [facility] urology/[facility]    Care Everywhere Prospective Auth Received 10/20/17     Patient ID       Consent for Services/Privacy Notice - Hospital/Clinic Received 18     Consent for Services - Hospital and Clinic Received 18     HIE Auth Received 18     Patient ID Scan Refused (Deleted) 17        Documents for the Encounter    CMS IM for Patient Signature       Observation Notice Received 18 left vm for son    CMS ARAGON for Patient Signature Received 18 left vm for  son      Admission Information     Attending Provider Admitting Provider Admission Type Admission Date/Time    Lion Patel MD Her, Doua, MD Emergency 09/27/18  0323    Discharge Date Hospital Service Auth/Cert Status Service Area     Observation Incomplete [facility]    Unit Room/Bed Admission Status        OBSERVATION DEPT 0225/0225-01 Admission (Confirmed)       Admission     Complaint    Hypoxia      Hospital Account     Name Acct ID Class Status Primary Coverage    Edil Lopez [id number] Observation Open UCARE - [employer] [facility] [facility] [employer]            Guarantor Account (for Hospital Account #[id number])     Name Relation to Pt Service Area Active? Acct Type    Edil Lopez Self FCS Yes Personal/Family    Address Phone          [address] [place] [address] [phone](H)  None(O)              Coverage Information (for Hospital Account #[id number])     F/O Payor/Plan Precert #    UCARE/[employer]ARE FOR S[employer]IORS [facility] [facility] [employer]     Subscriber Subscriber #    Edil Lopez [id number]    Address Phone    [address]  [place] [address] [phone]

## 2018-09-27 NOTE — IP AVS SNAPSHOT
Edil Lopez #[id number] (CSN:[id number])  (87 year old M)  (Adm: 18)     [id number]               [facility] OBSERVATION DEPARTMENT: [phone]            Patient Demographics     Patient Name Sex          Age SSN Address Phone    Edil Lopez Male 1931 (87 year old) [id number] [address] [address] [phone] (Home)  None (Work)  [phone] (Mobile)      Emergency Contact(s)     Name Relation Home Work Mobile    [employer] [place] (GUARDIAN) Other [phone] none none    Lee Lopez Son [phone] none [phone]    TheronlaurenSahil Relative [phone] none [phone]    Irene Long Relative [phone] none [phone]      Admission Information     Attending Provider Admitting Provider Admission Type Admission Date/Time    Lion Patel MD Her, Doua, MD Emergency 18  0323    Discharge Date Hospital Service Auth/Cert Status Service Area     Observation Incomplete [facility]    Unit Room/Bed Admission Status        OBSERVATION DEPT  Admission (Confirmed)       Admission     Complaint    Hypoxia      Hospital Account     Name Acct ID Class Status Primary Coverage    Edil Lopez [id number] Observation Open UCARE - [employer] FOR [employer]ORS [facility] [facility] [employer]            Guarantor Account (for Hospital Account #[id number])     Name Relation to Pt Service Area Active? Acct Type    Edil Lopez Self FCS Yes Personal/Family    Address Phone          [address] [place] [address] [phone](H)  None(O)              Coverage Information (for Hospital Account #[id number])     F/O Payor/Plan Precert #    UCARE/UCARE FOR SENIORS [facility]/NON [facility] PARTNERS     Subscriber Subscriber #    Edil Lopez [id number]    Address Phone    [address]  [place] [id number] [phone]                                                INTERAGENCY TRANSFER FORM - PHYSICIAN ORDERS   2018          "              [facility] OBSERVATION DEPARTMENT: [phone]            Attending Provider: Lion Patel MD     Allergies:  Lisinopril, Penicillins    Infection:  None   Service:  Observation    Ht:  1.727 m (5' 8\")   Wt:  74.8 kg (164 lb 12.8 oz)   Admission Wt:  74.8 kg (164 lb 12.8 oz)    BMI:  25.06 kg/m 2   BSA:  1.89 m 2            ED Clinical Impression     Diagnosis Description Comment Added By Time Added    Hypoxemia [R09.02] Hypoxemia [R09.02]  Brady Pimentel MD 9/27/2018  6:11 AM    Urinary catheter change required [Z46.6] Urinary catheter change required [Z46.6]  Brady Pimentel MD 9/27/2018  6:12 AM      Hospital Problems as of 9/28/2018              Priority Class Noted POA    Hypoxia Medium  9/27/2018 Yes      Non-Hospital Problems as of 9/28/2018              Priority Class Noted    Bradycardia Medium  5/22/2017    Renal failure Medium  5/23/2017      Code Status History     Date Active Date Inactive Code Status Order ID Comments User Context    9/28/2018  1:56 PM  DNR/DNI [id number]  Zoe Angeles PA-C Outpatient    9/27/2018  8:56 AM 9/28/2018  1:56 PM DNR/DNI [id number]  Lion Patel MD Inpatient    6/1/2017 10:16 AM 9/27/2018  8:56 AM DNR/DNI [id number]  Stanislaw Franco MD Outpatient    5/31/2017  2:04 PM 6/1/2017 10:16 AM DNR/DNI [id number]  Oxana Garza APRN CNS Inpatient    5/26/2017  6:16 PM 5/31/2017  2:04 PM Special Code [id number] Parameters:  DNR/DNI. No ICU. No pressors. IV and Oral meds to help with pt cardiac rhythm ok. See palliative care note 5/26/17 Oxana Garza APRN CNS Inpatient    5/26/2017  6:15 PM 5/26/2017  6:16 PM Special Code [id number] Parameters:  DNR/DNI. No ICU. No pressors. IV and Oral meds to help with pt cardiac rhythm ok. Oxana Garza, APRN CNS Inpatient    5/22/2017 12:10 PM 5/26/2017  6:15 PM DNR/DNI [id number]  Kat Quiroga PA-C Inpatient      Current Code Status     Date Active Code Status Order ID Comments User " "Context       Prior      Summary of Visit     Reason for your hospital stay       You were admitted due to concerns for low oxygen levels while being seen in the emergency room due to low urine output from your rutledge catheter. Your rutledge was exchanged in the ED without any concerns since then. Your urine was checked and you current having bacteria growing in your urine but this appears to be related to colonization since there are no other signs of an active infection at this time. You will not be treated with antibiotics. It appears that your low oxygen levels are related to undiagnosed sleep apnea and suspect chronic lung changes from previous tobacco use due to your oxygen saturations dropping when you fall asleep. You will be discharged with supplemental oxygen to wear at night and recommend you have an outpatient sleep study.                Medication Review      CONTINUE these medications which have NOT CHANGED        Dose / Directions Comments    acetaminophen 500 MG tablet   Commonly known as:  TYLENOL   Used for:  Other chronic pain        Dose:  1000 mg   Take 2 tablets (1,000 mg) by mouth 3 times daily 0800, 1400, 2200   Quantity:  1 Bottle   Refills:  0        AMIODARONE HCL PO        Dose:  100 mg   Take 100 mg by mouth daily   Refills:  0        amLODIPine 10 MG tablet   Commonly known as:  NORVASC   Used for:  Bradycardia        Dose:  10 mg   Take 1 tablet (10 mg) by mouth daily   Quantity:  60 tablet   Refills:  0        Ammonium Lactate 5 % Lotn        Externally apply topically 3 times daily Apply for itching (dry skin) to \"BACK and EXTREMITIES\"   Refills:  0        calcium carbonate 500 MG chewable tablet   Commonly known as:  TUMS   Used for:  Gastroesophageal reflux disease, esophagitis presence not specified        Dose:  1 chew tab   Take 1 tablet (500 mg) by mouth 2 times daily as needed for heartburn   Refills:  0        HYDRALAZINE HCL PO        Dose:  12.5 mg   Take 12.5 mg by mouth 3 " times daily   Refills:  0        levothyroxine 50 MCG tablet   Commonly known as:  SYNTHROID/LEVOTHROID        Dose:  50 mcg   Take 50 mcg by mouth daily   Refills:  0        magnesium hydroxide 400 MG/5ML suspension   Commonly known as:  MILK OF MAGNESIA   Used for:  Drug-induced constipation        Dose:  30 mL   Take 30 mLs by mouth daily as needed for constipation or heartburn   Quantity:  105 mL   Refills:  0        oxyCODONE IR 5 MG tablet   Commonly known as:  ROXICODONE   Used for:  Other chronic pain        Dose:  2.5 mg   Take 0.5 tablets (2.5 mg) by mouth 4 times daily Hold for sedation   Quantity:  5 tablet   Refills:  0        polyethylene glycol Packet   Commonly known as:  MIRALAX/GLYCOLAX   Used for:  Drug-induced constipation        Dose:  17 g   Take 17 g by mouth daily Hold for loose stools   Quantity:  7 packet   Refills:  0        ranitidine 75 MG tablet   Commonly known as:  ZANTAC        Dose:  75 mg   Take 75 mg by mouth daily   Refills:  0        senna-docusate 8.6-50 MG per tablet   Commonly known as:  SENOKOT-S;PERICOLACE   Used for:  Drug-induced constipation        Dose:  2 tablet   Take 2 tablets by mouth daily Hold for loose stools   Refills:  0                After Care     Activity       Your activity upon discharge: activity as tolerated       Diet       Follow this diet upon discharge: Orders Placed This Encounter      Combination Diet Dysphagia Diet Level 2: Mechan Altered; Nectar Thickened Liquids (pre-thickened or use instant food thickener)       Fall precautions           General info for SNF       Length of Stay Estimate: Long Term Care  Condition at Discharge: Stable  Level of care:board and care  Rehabilitation Potential: N/A  Admission H&P remains valid and up-to-date: Yes  Recent Chemotherapy: N/A  Use Nursing Home Standing Orders: Yes             Procedures     Oxygen Adult       Wear 1-2 L while sleeping or napping to maintain oxygen saturations >90%            "  Follow-Up Appointment Instructions     Follow-up and recommended labs and tests        Follow up with primary care provider, Shirley Toscano, within 7 days for hospital follow- up.  The following labs/tests are recommended: sleep study to assess for sleep apnea.             Statement of Approval     Ordered          09/28/18 1356  I have reviewed and agree with all the recommendations and orders detailed in this document.  EFFECTIVE NOW     Approved and electronically signed by:  Zoe Angeles PA-C                                                 INTERAGENCY TRANSFER FORM - NURSING   9/27/2018                       [facility] OBSERVATION DEPARTMENT: [phone]            Attending Provider: Lion Patel MD     Allergies:  Lisinopril, Penicillins    Infection:  None   Service:  Observation    Ht:  1.727 m (5' 8\")   Wt:  74.8 kg (164 lb 12.8 oz)   Admission Wt:  74.8 kg (164 lb 12.8 oz)    BMI:  25.06 kg/m 2   BSA:  1.89 m 2            Advance Directives        Scanned docmt in ACP Activity?           Yes, scanned ACP docmt        Immunizations     None      ASSESSMENT     Discharge Profile Flowsheet     EXPECTED DISCHARGE     FINAL RESOURCES      Expected Discharge Date  09/28/18 ([facility], Guardian) 09/28/18 0811   Resources List  Hospice 05/31/17 1453    DISCHARGE NEEDS ASSESSMENT     SKIN      Concerns To Be Addressed  all concerns addressed in this encounter 05/31/17 1453   Inspection of bony prominences  Full except (identify areas not inspected) 09/27/18 2103    # of Referrals Placed by CTS  Hospice 05/31/17 1453   Full except areas not inspected   Coccyx;Sacrum;Buttock, right;Buttock, left;Hip, right;Hip, left;Spine 09/27/18 2103    Does Patient Need a Referral for Clinic CC  No 05/31/17 1453   Inspection under devices  Full 09/27/18 1328    Primary Care Clinic Name  [facility] 05/31/17 1453   Skin WDL  ex;all 09/27/18 2103    Primary Care MD Name  [facility] 05/31/17 1453   Skin " "Color/Characteristics  bruised (ecchymotic) 09/27/18 2103    GASTROINTESTINAL (ADULT,PEDIATRIC,OB)     Skin Temperature  warm 09/27/18 2103    GI WDL  WDL 09/28/18 0809   Skin Moisture  dry 09/27/18 2103    Last Bowel Movement  09/23/18 (\"prob sat or sun\"- prn senna to be given) 09/28/18 1024   Skin Integrity  bruise(s) (Scattered - back and R hand, R groin redness) 09/27/18 2103    Passing flatus  yes 09/28/18 0809   SAFETY      COMMUNICATION ASSESSMENT     Safety WDL  WDL 09/28/18 1342    Patient's communication style  -- 09/27/18 1227   All Alarms  alarm(s) activated and audible 09/28/18 1342                 Assessment WDL (Within Defined Limits) Definitions           Safety WDL     Effective: 09/28/15    Row Information: <b>WDL Definition:</b> Bed in low position, wheels locked; call light in reach; upper side rails up x 2; ID band on<br> <font color=\"gray\"><i>Item=AS safety wdl>>List=AS safety wdl>>Version=F14</i></font>      Skin WDL     Effective: 09/28/15    Row Information: <b>WDL Definition:</b> Warm; dry; intact; elastic; without discoloration; pressure points without redness<br> <font color=\"gray\"><i>Item=AS skin wdl>>List=AS skin wdl>>Version=F14</i></font>      Vitals     Vital Signs Flowsheet     QUICK ADDS     Side Effects Monitoring: Sedation Level  1 09/1935    Quick Adds  EKG Monitoring 09/28/18 1004   LOUANN COMA SCALE      VITAL SIGNS     Best Eye Response  4-->(E4) spontaneous 09/28/18 0809    Temp  96.5  F (35.8  C) 09/28/18 1053   Best Motor Response  6-->(M6) obeys commands 09/28/18 0809    Temp src  Oral 09/28/18 1053   Best Verbal Response  5-->(V5) oriented 09/28/18 0809    Resp  16 09/28/18 1053   Louann Coma Scale Score  15 09/28/18 0809    Heart Rate  89 09/28/18 1053   HEIGHT AND WEIGHT      Pulse/Heart Rate Source  Monitor 09/28/18 1053   Height  1.727 m (5' 8\") 09/27/18 0824    BP  116/63 09/28/18 1053   Height Method  Stated 09/27/18 0824    BP Location  Right arm 09/28/18 " 1053   Weight  74.8 kg (164 lb 12.8 oz) 09/27/18 0824    OXYGEN THERAPY     Weight Method  Bed scale 09/27/18 0824    SpO2  95 % 09/28/18 1053   BSA (Calculated - sq m)  1.89 09/27/18 0824    O2 Device  Nasal cannula 09/28/18 1053   BMI (Calculated)  25.11 09/27/18 0824    Oxygen Delivery  2 LPM 09/28/18 1053   DAILY CARE      PAIN/COMFORT     Activity Management  up in chair 09/28/18 1004    Patient Currently in Pain  denies 09/28/18 0758   Activity Assistance Provided  assistance, 2 people 09/28/18 1342    Preferred Pain Scale  number (Numeric Rating Pain Scale) 09/27/18 1933   EKG MONITORING      Word Pain Scale  4 09/27/18 1933   Cardiac Regularity  Regular 09/28/18 1004    Pain Location  Penis 09/27/18 1933   Cardiac Rhythm  NSR (with borderline 1st degree block) 09/28/18 1004    Pain Descriptors  -- (intermittent) 09/27/18 1933   POSITIONING      Pain Intervention(s)  Medication (See eMAR) 09/28/18 0826   Body Position  side-lying, left;supine, head elevated 09/28/18 0754    ANALGESIA SIDE EFFECTS MONITORING     Head of Bed (HOB)  HOB at 20 degrees 09/28/18 0754    Side Effects Monitoring: Respiratory Quality  R 09/1935   Positioning/Transfer Devices  immobilization device 09/28/18 1342    Side Effects Monitoring: Respiratory Depth  N 09/1935                 Patient Lines/Drains/Airways Status    Active LINES/DRAINS/AIRWAYS     Name: Placement date: Placement time: Site: Days: Last dressing change:    Urethral Catheter Coude 16 fr 09/27/18   0451   Coude   1     Peripheral IV 09/27/18 Right Lower forearm 09/27/18   0420   Lower forearm   1             Patient Lines/Drains/Airways Status    Active PICC/CVC     None            Intake/Output Detail Report     Date Intake   Output Net    Shift P.O. IV Piggyback Total Urine Total       Noc 09/26/18 2300 - 09/27/18 0659 -- -- -- -- -- 0    Day 09/27/18 0700 - 09/27/18 1459 -- -- -- 600 600 -600    Yi 09/27/18 1500 - 09/27/18 2259 -- -- -- 525 525 -525     Noc 09/27/18 2300 - 09/28/18 0659 320 -- 320 375 375 -55    Day 09/28/18 0700 - 09/28/18 1459 -- -- -- 450 450 -450      Case Management/Discharge Planning     Case Management/Discharge Planning Flowsheet     REFERRAL INFORMATION     Concerns To Be Addressed  all concerns addressed in this encounter 05/31/17 1453    Arrived From  long-term care 05/31/17 1451   DISCHARGE PLANNING      # of Referrals Placed by CTS  Hospice 05/31/17 1453   Does Patient Need a Referral for Clinic CC  No 05/31/17 1453    Primary Care Clinic Name  [facility] 05/31/17 1453   FINAL RESOURCES      Primary Care MD Name  [facility] 05/31/17 1453   Resources List  Hospice 05/31/17 1453    LIVING ENVIRONMENT     ABUSE RISK SCREEN      Lives With  facility resident 05/24/17 0148   QUESTION TO PATIENT:  Has a member of your family or a partner(now or in the past) intimidated, hurt, manipulated, or controlled you in any way?  no 09/27/18 0326    Living Arrangements  extended care facility 05/31/17 1453   QUESTION TO PATIENT: Do you feel safe going back to the place where you are living?  yes 09/27/18 0326    COPING/STRESS     OBSERVATION: Is there reason to believe there has been maltreatment of a vulnerable adult (ie. Physical/Sexual/Emotional abuse, self neglect, lack of adequate food, shelter, medical care, or financial exploitation)?  no 09/27/18 0326    Major Change/Loss/Stressor  none 05/24/17 0625   OTHER      EXPECTED DISCHARGE     Are you depressed or being treated for depression?  No 09/27/18 1223    Expected Discharge Date  09/28/18 ([facility] L[facility], Guardian) 09/28/18 0811   HOMICIDE RISK      ASSESSMENT/CONCERNS TO BE ADDRESSED     Feels Like Hurting Others  no 09/27/18 0326                  [facility] OBSERVATION DEPARTMENT: [phone]            Medication Administration Report for Edil Lopez as of 09/28/18 6742   Legend:    Given Hold Not Given Due Canceled Entry Other Actions    Time Time (Time) Time  Time-Action        Inactive    Active    Linked        Medications 09/22/18 09/23/18 09/24/18 09/25/18 09/26/18 09/27/18 09/28/18    acetaminophen (TYLENOL) Suppository 650 mg  Dose: 650 mg  Freq: EVERY 4 HOURS PRN Route: RE  PRN Reason: mild pain  Start: 09/27/18 0856   Admin Instructions: Alternate ibuprofen (if ordered) with acetaminophen.  Maximum acetaminophen dose from all sources = 75 mg/kg/day not to exceed 4 grams/day.    Admin. Amount: 1 suppository (1 × 650 mg suppository)  Dispense Loc:  Main Pharmacy               acetaminophen (TYLENOL) tablet 1,000 mg  Dose: 1,000 mg  Freq: 3 TIMES DAILY Route: PO  Start: 09/27/18 0857   Admin Instructions: Maximum acetaminophen dose from all sources = 75 mg/kg/day not to exceed 4 gram    Admin. Amount: 2 tablet (2 × 500 mg tablet)  Last Admin: 09/28/18 0754  Dispense Loc:  ADS MS2B OBS          1115 (1,000 mg)-Given       1934 (1,000 mg)-Given        0327 (1,000 mg)-Given       0754 (1,000 mg)-Given       [ ] 1900           acetaminophen (TYLENOL) tablet 650 mg  Dose: 650 mg  Freq: EVERY 4 HOURS PRN Route: PO  PRN Reason: mild pain  Start: 09/27/18 0856   Admin Instructions: Alternate ibuprofen (if ordered) with acetaminophen.  Maximum acetaminophen dose from all sources = 75 mg/kg/day not to exceed 4 grams/day.    Admin. Amount: 2 tablet (2 × 325 mg tablet)  Dispense Loc:  ADS MS2B OBS               amiodarone (PACERONE/CODARONE) half-tab 100 mg  Dose: 100 mg  Freq: DAILY Route: PO  Start: 09/27/18 0857   Admin Instructions: Avoid grapefruit juice during oral amiodarone treatment.    Admin. Amount: 1 half-tab (1 × 100 mg half-tab)  Last Admin: 09/28/18 0755  Dispense Loc:  ADS MS2B OBS          1115 (100 mg)-Given        0755 (100 mg)-Given           amLODIPine (NORVASC) tablet 10 mg  Dose: 10 mg  Freq: DAILY Route: PO  Start: 09/27/18 0857   Admin. Amount: 1 tablet (1 × 10 mg tablet)  Last Admin: 09/28/18 0754  Dispense Loc:  ADS MS2B OBS          1115 (10 mg)-Given         0754 (10 mg)-Given           hydrALAZINE (APRESOLINE) half-tab 12.5 mg  Dose: 12.5 mg  Freq: 3 TIMES DAILY Route: PO  Start: 09/28/18 1400   Admin Instructions: Hold if SBP <110    Admin. Amount: 1 half-tab (1 × 12.5 mg half-tab)  Dispense Loc:  ADS MS2B OBS           [ ] 1400       [ ] 2000           ipratropium - albuterol 0.5 mg/2.5 mg/3 mL (DUONEB) neb solution 3 mL  Dose: 3 mL  Freq: EVERY 4 HOURS PRN Route: NEBULIZATION  PRN Reason: wheezing  Start: 09/28/18 0834   Admin. Amount: 3 mL  Last Admin: 09/28/18 0920  Dispense Loc: [facility] ADS MS2B OBS  Volume: 3 mL           0920 (3 mL)-Given           levothyroxine (SYNTHROID/LEVOTHROID) tablet 50 mcg  Dose: 50 mcg  Freq: DAILY Route: PO  Start: 09/28/18 0800   Admin Instructions: Separate oral administration of iron- or calcium-containing products and levothyroxine by at least 4 hours.    Admin. Amount: 1 tablet (1 × 50 mcg tablet)  Last Admin: 09/28/18 1004  Dispense Loc:  ADS MS2B OBS           1004 (50 mcg)-Given           melatonin tablet 1 mg  Dose: 1 mg  Freq: AT BEDTIME PRN Route: PO  PRN Reason: sleep  Start: 09/27/18 0856   Admin Instructions: Do not give unless at least 6 hours of uninterrupted sleep is expected.    Admin. Amount: 1 tablet (1 × 1 mg tablet)  Dispense Loc:  ADS MS2B OBS               miconazole (MICATIN; MICRO GUARD) 2 % powder  Freq: EVERY 1 HOUR PRN Route: Top  PRN Reason: other  PRN Comment: topical candidiasis  Start: 09/27/18 1047   Admin Instructions: Apply to affected area.      Last Admin: 09/28/18 0803  Dispense Loc:  Main Pharmacy          1321 ( )-Given        0803 ( )-Given           naloxone (NARCAN) injection 0.1-0.4 mg  Dose: 0.1-0.4 mg  Freq: EVERY 2 MIN PRN Route: IV  PRN Reason: opioid reversal  Start: 09/27/18 0856   Admin Instructions: For respiratory rate LESS than or EQUAL to 8.  Partial reversal dose:  0.1 mg titrated q 2 minutes for Analgesia Side Effects Monitoring Sedation Level of 3 (frequently drowsy,  arousable, drifts to sleep during conversation).Full reversal dose:  0.4 mg bolus for Analgesia Side Effects Monitoring Sedation Level of 4 (somnolent, minimal or no response to stimulation).  For ordered IV doses 0.1-2mg give IVP. Give each 0.4mg over 15 seconds in emergency situations. For non-emergent situations further dilute in 9mL of NS to facilitate titration of response.    Admin. Amount: 0.1-0.4 mg = 0.25-1 mL Conc: 0.4 mg/mL  Dispense Loc: [facility] ADS MS2B OBS  Volume: 1 mL               nitroGLYcerin (NITROSTAT) sublingual tablet 0.4 mg  Dose: 0.4 mg  Freq: EVERY 5 MIN PRN Route: SL  PRN Reason: chest pain  Start: 09/27/18 0856   Admin Instructions: Maximum 3 doses in 15 minutes.  Notify provider if no relief after 3 doses.    Do NOT give nitroglycerin SL IF the patient has taken avanafil (STENDRA), sildenafil (VIAGRA) or (REVATIO) within the last 8 hours, vardenafil (LEVITRA) or (STAXYN) within the last 18 hours, tadalafil (CIALIS) or (ADCIRCA) within the last 36 hours. Inform provider if patient has taken one of these medications.  If patient is still having acute angina requiring treatment, an alternative treatment option may be used such as: IV beta-blocker [2.5 mg - 5 mg metoprolol (LOPRESSOR)] if ordered by a provider.    Admin. Amount: 1 tablet (1 × 0.4 mg tablet)  Dispense Loc: [facility] ADS MS2B OBS               oxyCODONE IR (ROXICODONE) half-tab 2.5 mg  Dose: 2.5 mg  Freq: EVERY 4 HOURS PRN Route: PO  PRN Reason: moderate to severe pain  Start: 09/27/18 0856   Admin. Amount: 1 half-tab (1 × 2.5 mg half-tab)  Dispense Loc: [facility] ADS MS2B OBS               ranitidine (ZANTAC) tablet 75 mg  Dose: 75 mg  Freq: DAILY Route: PO  Start: 09/28/18 0800   Admin. Amount: 1 tablet (1 × 75 mg tablet)  Last Admin: 09/28/18 1025  Dispense Loc: RH ADS MS2B OBS           1025 (75 mg)-Given           senna-docusate (SENOKOT-S;PERICOLACE) 8.6-50 MG per tablet 1 tablet  Dose: 1 tablet  Freq: 2 TIMES DAILY PRN Route: PO  PRN  Reason: constipation  Start: 18 0856   Admin Instructions: If no bowel movement in 24 hours, increase to 2 tablets PO.  Hold for loose stools.  This is the first step of a three step constipation treatment.    Admin. Amount: 1 tablet  Dispense Loc: [facility] ADS MS2B OBS                     Or  senna-docusate (SENOKOT-S;PERICOLACE) 8.6-50 MG per tablet 2 tablet  Dose: 2 tablet  Freq: 2 TIMES DAILY PRN Route: PO  PRN Reason: constipation  Start: 1856   Admin Instructions: Hold for loose stools.  This is the first step of a three step constipation treatment.    Admin. Amount: 2 tablet  Last Admin: 18 1005  Dispense Loc: RH ADS MS2B OBS           1005 (2 tablet)-Given          Completed Medications  Medications 18         Dose: 6 mL  Freq: ONCE Route: NEBULIZATION  Start: 18   End: 18 0400   Admin. Amount: 6 mL  Last Admin: 18 040  Dispense Loc: RH ADS ERA  Administrations Remainin  Volume: 6 mL          0400 (6 mL)-Given              Dose: 60 mg  Freq: ONCE Route: PO  Start: 18   End: 18   Admin. Amount: 3 tablet (3 × 20 mg tablet)  Last Admin: 18  Dispense Loc: RH ADS ERA  Administrations Remainin          0422 (60 mg)-Given           Discontinued Medications  Medications 18         Dose: 650 mg  Freq: EVERY 4 HOURS PRN Route: RE  PRN Reason: mild pain  Start: 18 0856   End: 18 103   Admin Instructions: Alternate ibuprofen (if ordered) with acetaminophen.  Maximum acetaminophen dose from all sources = 75 mg/kg/day not to exceed 4 grams/day.    Admin. Amount: 1 suppository (1 × 650 mg suppository)          1030-Med Discontinued          Dose: 650 mg  Freq: EVERY 4 HOURS PRN Route: PO  PRN Reason: mild pain  Start: 18 0856   End: 18 1030   Admin Instructions: Alternate ibuprofen (if ordered) with  "acetaminophen.  Maximum acetaminophen dose from all sources = 75 mg/kg/day not to exceed 4 grams/day.    Admin. Amount: 2 tablet (2 × 325 mg tablet)          1030-Med Discontinued          Dose: 100 mg  Freq: DAILY Route: PO  Start: 18 08   End: 18   Admin Instructions: Avoid grapefruit juice during oral amiodarone treatment.    Admin. Amount: 1 half-tab (1 × 100 mg half-tab)                  809-Med Discontinued         Dose: 12.5 mg  Freq: EVERY 8 HOURS Route: PO  Start: 18   End: 18   Admin. Amount: 1 half-tab (1 × 12.5 mg half-tab)  Last Admin: 18  Dispense Loc: RH ADS MS2B OBS          (1311)-Not Given [C]       1630 (12.5 mg)-Given       2358 (12.5 mg)-Given        0755 (12.5 mg)-Given       0810-Med Discontinued  857-Hold [C]             Freq: EVERY 1 HOUR PRN Route: Top  PRN Reason: pain  PRN Comment: with VAD insertion or accessing implanted port.  Start: 18   End: 18   Admin Instructions: Do NOT give if patient has a history of allergy to any local anesthetic or any \"herman\" product.   Apply 30 minutes prior to VAD insertion or port access.  MAX Dose:  2.5 g (  of 5 g tube)    Dispense Loc: [facility] ADS MS2B OBS          1206-Med Discontinued          Start: 18   End: 18   Admin Instructions: Chiquita Urbano : jd override    Administrations Remainin                 809-Med Discontinued          Dose: 1 mL  Freq: EVERY 1 HOUR PRN Route: OTHER  PRN Comment: mild pain with VAD insertion or accessing implanted port  Start: 18   End: 18   Admin Instructions: Do NOT give if patient has a history of allergy to any local anesthetic or any \"herman\" product. MAX dose 1 mL subcutaneous OR intradermal in divided doses.    Admin. Amount: 1 mL  Dispense Loc: [facility] Stock  Volume: 2 mL          1206-Med Discontinued          Dose: 1 mg  Freq: AT BEDTIME PRN Route: PO  PRN Reason: sleep  Start: " 09/27/18 0856   End: 09/27/18 1030   Admin Instructions: Do not give unless at least 6 hours of uninterrupted sleep is expected.    Admin. Amount: 1 tablet (1 × 1 mg tablet)          1030-Med Discontinued          Dose: 0.1-0.4 mg  Freq: EVERY 2 MIN PRN Route: IV  PRN Reason: opioid reversal  Start: 09/27/18 0856   End: 09/27/18 1030   Admin Instructions: For respiratory rate LESS than or EQUAL to 8.  Partial reversal dose:  0.1 mg titrated q 2 minutes for Analgesia Side Effects Monitoring Sedation Level of 3 (frequently drowsy, arousable, drifts to sleep during conversation).Full reversal dose:  0.4 mg bolus for Analgesia Side Effects Monitoring Sedation Level of 4 (somnolent, minimal or no response to stimulation).  For ordered IV doses 0.1-2mg give IVP. Give each 0.4mg over 15 seconds in emergency situations. For non-emergent situations further dilute in 9mL of NS to facilitate titration of response.    Admin. Amount: 0.1-0.4 mg = 0.25-1 mL Conc: 0.4 mg/mL  Volume: 1 mL          1030-Med Discontinued          Dose: 4 mg  Freq: EVERY 6 HOURS PRN Route: PO  PRN Reasons: nausea,vomiting  Start: 09/27/18 0856   End: 09/27/18 1030   Admin Instructions: This is Step 1 of nausea and vomiting management.  If nausea not resolved in 15 minutes, go to Step 2 prochlorperazine (COMPAZINE). Do not push through foil backing. Peel back foil and gently remove. Place on tongue immediately. Administration with liquid unnecessary  With dry hands, peel back foil backing and gently remove tablet; do not push oral disintegrating tablet through foil backing; administer immediately on tongue and oral disintegrating tablet dissolves in seconds; then swallow with saliva; liquid not required.    Admin. Amount: 1 tablet (1 × 4 mg tablet)          1030-Med Discontinued       Or    Dose: 4 mg  Freq: EVERY 6 HOURS PRN Route: IV  PRN Reasons: nausea,vomiting  Start: 09/27/18 0856   End: 09/27/18 1030   Admin Instructions: This is Step 1 of  nausea and vomiting management.  If nausea not resolved in 15 minutes, go to Step 2 prochlorperazine (COMPAZINE).  Irritant. For ordered IV doses 0.1-4 mg, give IV Push undiluted over 2-5 minutes.    Admin. Amount: 4 mg = 2 mL Conc: 4 mg/2 mL  Infused Over: 2-5 Minutes  Volume: 2 mL          1030-Med Discontinued          Dose: 4 mg  Freq: EVERY 6 HOURS PRN Route: PO  PRN Reasons: nausea,vomiting  Start: 09/27/18 0856   End: 09/27/18 1101   Admin Instructions: This is Step 1 of nausea and vomiting management.  If nausea not resolved in 15 minutes, go to Step 2 prochlorperazine (COMPAZINE). Do not push through foil backing. Peel back foil and gently remove. Place on tongue immediately. Administration with liquid unnecessary  With dry hands, peel back foil backing and gently remove tablet; do not push oral disintegrating tablet through foil backing; administer immediately on tongue and oral disintegrating tablet dissolves in seconds; then swallow with saliva; liquid not required.    Admin. Amount: 1 tablet (1 × 4 mg tablet)  Dispense Loc: [facility] MS2B OBS          1101-Med Discontinued       Or    Dose: 4 mg  Freq: EVERY 6 HOURS PRN Route: IV  PRN Reasons: nausea,vomiting  Start: 09/27/18 0856   End: 09/27/18 1101   Admin Instructions: This is Step 1 of nausea and vomiting management.  If nausea not resolved in 15 minutes, go to Step 2 prochlorperazine (COMPAZINE).  Irritant. For ordered IV doses 0.1-4 mg, give IV Push undiluted over 2-5 minutes.    Admin. Amount: 4 mg = 2 mL Conc: 4 mg/2 mL  Dispense Loc: [facility] MS2B OBS  Infused Over: 2-5 Minutes  Volume: 2 mL          1101-Med Discontinued          Dose: 3 mL  Freq: EVERY 8 HOURS Route: IK  Start: 09/27/18 0857   End: 09/27/18 1206   Admin Instructions: And Q1H PRN, to lock peripheral IV dormant line.    Admin. Amount: 3 mL  Dispense Loc: [facility] Floor Stock  Volume: 4 mL                 1206-Med Discontinued          Dose: 3 mL  Freq: EVERY 1 HOUR PRN Route: IK  PRN  Reason: line flush  PRN Comment: for peripheral IV flush post IV meds  Start: 09/27/18 0856   End: 09/27/18 1206   Admin. Amount: 3 mL  Dispense Loc: [facility] Floor Stock  Volume: 4 mL          1206-Med Discontinued     Medications 09/22/18 09/23/18 09/24/18 09/25/18 09/26/18 09/27/18 09/28/18               INTERAGENCY TRANSFER FORM - NOTES (H&P, Discharge Summary, Consults, Procedures, Therapies)   9/27/2018                       [facility] OBSERVATION DEPARTMENT: [phone]               History & Physicals      H&P by Lion Patel MD at 9/27/2018  6:53 AM     Author:  Lion Patel MD Service:  Hospitalist Author Type:  Physician    Filed:  9/27/2018  6:53 AM Date of Service:  9/27/2018  6:53 AM Creation Time:  9/27/2018  6:37 AM    Status:  Signed :  Lion Patel MD (Physician)                        [facility]  Hospitalist Admission Note  September 27, 2018  Name: Edil Lopez    MRN: [id number]  YOB: 1931    Age: 87 year old  Date of admission: 9/27/2018  Primary care provider: Shirley Toscano      Summary:  Patient is a pleasant 87-year-old gentleman with a history of dementia, tachybradycardia syndrome who was actually admitted last year in May 2017 with initial bradycardia felt to be secondary to metoprolol but does go into atrial fibrillation with RVR now on amiodarone, chronic kidney disease, history of chronic urinary retention with an indwelling Ortiz catheter that needs to be changed every 4 weeks, GERD, hypertension who initially presented here with urinary retention and no urine output through the Ortiz catheter and was successfully replaced in the ED with good urine output but will be admitted because patient had some hypoxemia requiring O2 supplementation in the ED.    Problem list/Plan:  1. Hypoxia: During my interview, I did take his O2 supplementation off and he did desat in the low to mid 80s when he is not breathing.  Comes up nicely when encouraged to  take deep breaths.  Suspect more so hypoventilation and a component of sleep apnea. Do not suspect any significant cardiopulmonary disease.  Chest x-ray was negative.  Will check a d-dimer for completeness and if positive, would consider a VQ scan in the setting of chronic kidney disease  2. Chronic urinary retention: Does have chronic indwelling catheter.  This was the main reason for his initial ER presentation.  This has been replaced and Ortiz catheter is currently functioning well.  Some mild pyuria but no signs or symptoms of infection.  Hold off on antibiotics  3. Chronic kidney disease: Baseline creatinine appears to be around 1.8.  At baseline at this time.  Will monitor for now  4. Dementia: Currently in long-term care at [facility].  Currently pleasant.  Delirium precautions.  5. Chronic pain syndrome: Apparently on scheduled oxycodone but will only offer this on a as needed basis while under observation  6. Tachybradycardia syndrome: Chronically on amiodarone.  Will continue amiodarone 200 mg p.o. daily.  7. Hypertension: Continue amlodipine and hydralazine.  8. History of GERD: Holding PPI while under observation.    Observation admission    All lab work and imaging data independently reviewed by myself    Prophylaxis;  Consider heparin subcu if patient transitions to inpatient status/Ambulation.     Code status: DNR/DNI as noted by palliative care consultations during previous hospitalizations    Discharge: Back to long-term care when able    Chief Complaint:   Catheter problem, noted to be hypoxic at times in the ED   HPI  Patient is a pleasant 87-year-old gentleman with a history of dementia, tachybradycardia syndrome who was actually admitted last year in May 2017 with initial bradycardia felt to be secondary to metoprolol but does go into atrial fibrillation with RVR now on amiodarone, history of chronic urinary retention with an indwelling Ortiz catheter that needs to be changed  every 4 weeks, GERD, hypertension who initially presented here with urinary retention and no urine output through the Ortiz catheter.  Patient does not report any complaints per se and a Ortiz catheter was replaced in the ED with good urine output.  However, it was noted that the patient does desat at times to the mid to high 80s requiring O2 supplementation and thus was recommended to be admitted under observation to determine whether or not he will need supplemental O2 at discharge.  He otherwise denies any chest pain, cough, shortness of breath, abdominal pain, nausea, vomiting, or diarrhea.  I did discuss the case in detail with the ED physician.     Past Medical History:[DH1.1]     Past Medical History:   Diagnosis Date     Anemia      BPH (benign prostatic hyperplasia)      Chronic kidney disease      CVA (cerebral infarction)      Dementia      Gastro-oesophageal reflux disease      Hyperparathyroidism (H)      Hypertension      Hypothyroidism      Neuromuscular disorder (H)      PVD (peripheral vascular disease) (H)[DH1.2]      Past Surgical History:[DH1.1]     Past Surgical History:   Procedure Laterality Date     APPENDECTOMY OPEN       ENDARTERECTOMY CAROTID       HERNIA REPAIR       Suprapubic catheter placement[DH1.2]       Social History:[DH1.1]     Social History   Substance Use Topics     Smoking status: Former Smoker     Smokeless tobacco: Never Used     Alcohol use No[DH1.2]     Family History:  Family history reviewed. NO pertinent family history     Allergies:[DH1.1]     Allergies   Allergen Reactions     Lisinopril      Penicillins[DH1.2]      Medications: To be reconciled       Review of Systems:   A Comprehensive greater than 10 system review of systems was carried out.  Pertinent positives and negatives are noted above.  Otherwise negative for contributory information.        Physical Exam:[DH1.1]  Blood pressure 123/70, temperature 98.2  F (36.8  C), temperature source Oral, resp. rate 18,  SpO2 94 %.[DH1.2]  Gen: Pleasant, moderately confused but pleasant.  Otherwise, in no acute distress.  HEENT: NCAT. EOMI. PERRL.  Neck: Normal inspection. No bruit, JVD or thyromegaly.  Lungs: Normal respiratory effort. Clear to auscultation bilaterally with no crackles or wheezes.  Card: N s1s2. RRR. No M/R/G.  Peripheral pulses present and symmetric.   Abd: Soft NT ND. No mass. Normal bowel sounds.  Skin: No rash. Warm to the touch  Extr: No edema. CMS intact  Psychiatric: Patient alert and grossly oriented to self and place but not time.  Normal affect  Neurologic: Cranial nerves II-XII are intact. Motor strength 5/5    Data:[DH1.1]       Recent Labs  Lab 09/27/18  0417   WBC 12.0*   HGB 10.2*   HCT 32.3*   MCV 94          Recent Labs  Lab 09/27/18  0417      POTASSIUM 4.4   CHLORIDE 110*   CO2 20   ANIONGAP 9   *   BUN 30   CR 1.80*   GFRESTIMATED 36*   GFRESTBLACK 43*   JENNA 10.2*       Recent Labs  Lab 09/27/18  0450   COLOR Yellow   APPEARANCE Slightly Cloudy   URINEGLC Negative   URINEBILI Negative   URINEKETONE Negative   SG 1.011   UBLD Moderate*   URINEPH 7.0   PROTEIN 100*   NITRITE Negative   LEUKEST Large*   RBCU 49*   WBCU 46*[DH1.3]       Imaging:[DH1.1]   Recent Results (from the past 24 hour(s))   Chest XR,  PA & LAT    Narrative    CHEST 2 VIEWS  9/27/2018 5:12 AM     HISTORY: Hypoxia. Wheezing.    COMPARISON: 5/22/2017.    FINDINGS: A linear opacity in the mid right lung likely represents  scarring or atelectasis. The lungs are otherwise clear. Mild  cardiomegaly. Atherosclerotic calcification in the thoracic aorta.      Impression    IMPRESSION: No evidence of active cardiopulmonary disease.    GEETA GARCIA MD[DH1.3]       Lion Patel MD Pager [phone][DH1.1]         Revision History        User Key Date/Time User Provider Type Action    > DH1.3 9/27/2018  6:53 AM Lion Patel MD Physician Sign     DH1.2 9/27/2018  6:41 AM Lion Patel MD Physician      DH1.1 9/27/2018  6:37  Lion Briceno MD Physician                   Discharge Summaries     No notes of this type exist for this encounter.         Consult Notes      Consults by Marizol Villela RN at 9/27/2018  1:37 PM     Author:  Marizol Villela RN Service:  (none) Author Type:      Filed:  9/27/2018  1:56 PM Date of Service:  9/27/2018  1:37 PM Creation Time:  9/27/2018  1:31 PM    Status:  Addendum :  Marizol Villela RN ()     Consult Orders:    1. Care Coordinator IP Consult [[id number]] ordered by eTagan Robison PA-C at 09/27/18 1331                Discharge Planner   Discharge Plans in progress: Pt resides at LTAC, located within [facility], call placed to verify bedhold, bedhold currently in place. Per provider, pt will likely discharge back to facility tomorrow w/ new oxygen orders. Updated facility.   Barriers to discharge plan:[AC1.1] overnight oximetry study[AC1.2] Follow up plan: CTS to follow-up re discharge planning after care rounds tomorrow.        Entered by: Marizol Villela 09/27/2018 1:31 PM[AC1.1]        Revision History        User Key Date/Time User Provider Type Action    > AC1.2 9/27/2018  1:56 PM Marizol Villela RN Case Manager Addend     AC1.1 9/27/2018  1:37 PM Marizol Villela RN Case Manager Sign                     Progress Notes - Physician (Notes for yesterday and today)      Progress Notes by Ginny Ramirez MD at 9/28/2018  1:41 AM     Author:  Ginny Ramirez MD Service:  Hospitalist Author Type:  Physician    Filed:  9/28/2018  1:42 AM Date of Service:  9/28/2018  1:41 AM Creation Time:  9/28/2018  1:41 AM    Status:  Signed :  Ginny Ramirez MD (Physician)         Paged for pause: Pt asymptomatic. Will continue to monitor[SM1.1]     Revision History        User Key Date/Time User Provider Type Action    > SM1.1 9/28/2018  1:42 AM Ginny Ramirez MD Physician Sign            Progress Notes by Teagan Robison PA-C at 9/27/2018  1:22 PM     Author:  Enriqueta  Teagan Ray, PA-C Service:  Hospitalist Author Type:  Physician Assistant    Filed:  9/27/2018  1:52 PM Date of Service:  9/27/2018  1:22 PM Creation Time:  9/27/2018  1:22 PM    Status:  Attested :  Teagan Robison PA-C (Physician Assistant)    Cosigner:  Lucretia Yanes MD at 9/27/2018  2:12 PM        Attestation signed by Lucretia Yanes MD at 9/27/2018  2:12 PM        Physician Attestation   I, Lucretia Yanes, have reviewed and discussed with the advanced practice provider their history, physical and plan for Edil GUTIERREZ John. I did not participate in a shared visit by interviewing or examining the patient and this should be billed as an advanced practice provider only visit.    Lucretia Yanes  Date of Service (when I saw the patient): I did not personally see this patient today.                               Hospitalist Addendum:    Pt admitted earlier this morning for urinary catheter problem (no urine outpt). S/p exchange and urinary issue resolved. However he was found to have episodes of hypoxia down to the mid 80's but this is while he was sleeping or dozing. His sats will come up to the mid 90's when he's awake and talking. He was admitted for observation.     Here on the floor pt was found to be sleeping and has visible signs of apnea. His sats was 87% but after I woke him up, his sats went to 93-96%. He denies hx of MIMI but has never had sleep study. He denies any hx of PE, or DVT and denies any SOB or pleuritic CP.   His D-dimer is minimally elevated at 0.6 but that can certainly be age appropriate and due to CKD.     At this point, I suspect his sxs is related to undx MIMI. Will place him on pulseOx overnight and supplemental O2.   Low suspicion for cardiopulm cause of hypoxia yanira it only happens while he is sleeping.  Plan for supplemental O2 at night at [facility] and rec outpt sleep study.[CH1.1]     Also nurse states malodorous urine. Has hx of UTI and indwelling cath but no infectious  "signs. Will hold on abx and wait for cx.[CH1.2]      Revision History        User Key Date/Time User Provider Type Action    > [N/A] 9/27/2018  1:52 PM Robison, Teagan Talavera PA-C Physician Assistant Sign     CH1.2 9/27/2018  1:51 PM Robison, Teagan Talavera PA-C Physician Assistant Sign     CH1.1 9/27/2018  1:22 PM Robison, Teagan Talavera PA-C Physician Assistant             ED Provider Notes by Brady Pimentel MD at 9/27/2018  3:23 AM     Author:  Brady Pimentel MD Service:  Emergency Medicine Author Type:  Physician    Filed:  9/27/2018  6:32 AM Date of Service:  9/27/2018  3:23 AM Creation Time:  9/27/2018  3:24 AM    Status:  Signed :  Brady Pimentel MD (Physician)           History     Chief Complaint:  Catheter Problem    The history is provided by[AS1.1] the patient, the EMS personnel and the nursing home[AS1.2].      Edil Lopez is a 87 year old male, with history of dementia, chronic kidney disease, cerebral infarction, hypertension,[AS1.1]diverticulitis[AS1.3] among others[AS1.2] who presents for evaluation of a catheter problem. He was seen here in the ED in 7/2018 for[AS1.1] catheter problems wh[AS1.2]en he had a CT and was found to have diverticulitis. He lives at [facility], where he was having his[AS1.1] rutledge[AS1.4] catheter changed as it was not[AS1.1] clogged and not flushing[AS1.2]. When placing the new catheter they were unable to advance it. He presents[AS1.1] with[AS1.2] EMS who report that he is at his baseline mentation, and normal respiratory status. On arrival he is wheezing, and oxygen saturation is 86%. He was placed on oxygen via nasal cannula.[AS1.1] He states he is not normally on oxygen at his nursing home.[AS1.2] He has pain in his lower abdomen and groin, and states that it feels \"u[AS1.1]ncomfortable.\"[AS1.2] Denies cough, fever, vomiting, diarrhea, chest pain, or difficulty breathing.[AS1.1] He gets around normally in a " wheelchair.[AS1.2]    Allergies:  Lisinopril  Penicillins     Medications:    Tylenol  Amiodarone  Amlodipine  Ducolax  Apresoline  Ativan  Roxicodone  Protonix  Miralax  Senna-docusate     Past Medical History:    Anemia  Benign prostatic hyperplasia  Chronic kidney disease  Cerebral infarction  Dementia  Diverticulitis  GERD  Hyperparathyroidism  Hypertension  Hyperthyroidism  Neuromuscular disorder  Peripheral vascular disease    Past Surgical History:    Appendectomy open  Endarterectomy carotid  Hernia repair  Suprapubic catheter placement    Family History:    No past pertinent family history.     Social History:  Relationship status:   Tobacco use: Former  Alcohol use: No  The patient presents via ambulance.    Marital Status:   [5]     Review of Systems   Respiratory: Positive for[AS1.1] wheezing[AS1.2]. Negative for[AS1.1] cough[AS1.2] and[AS1.1] shortness of breath[AS1.2].    Cardiovascular: Negative for[AS1.1] chest pain[AS1.2].   Gastrointestinal: Positive for[AS1.1] abdominal pain[AS1.2]. Negative for[AS1.1] diarrhea[AS1.2],[AS1.1] nausea[AS1.2] and[AS1.1] vomiting[AS1.2].[AS1.1]   All other systems reviewed and are negative[AS1.2].    Physical Exam[AS1.1]     Patient Vitals for the past 24 hrs:   BP Temp Temp src Heart Rate Resp SpO2   09/27/18 0600 114/67 - - - - 92 %   09/27/18 0545 118/59 - - - - 94 %   09/27/18 0500 111/63 - - - - 91 %   09/27/18 0445 (!) 139/99 - - - - -   09/27/18 0430 126/65 - - - - -   09/27/18 0415 (!) 141/110 - - - - -   09/27/18 0400 - - - - - 97 %   09/27/18 0328 (!) 140/98 - - - - 92 %   09/27/18 0327 - 98.2  F (36.8  C) Oral 96 18 (!) 88 %[AS1.5]        Physical Exam[AS1.1]  VS: Reviewed per above  HENT: Mucous membranes[AS1.6] moist[EL1.1]  EYES: sclera anicteric  CV: Rate as noted, regular rhythm.   RESP: Effort normal.[AS1.6] insp wheezing[EL1.1]  GI:[AS1.6] lower[EL1.1] tenderness, not distended.[AS1.6]  : hypospadias, scant blood near  meatus[EL1.1]  NEURO: Alert, moving all extremities  MSK: No deformity of the extremities  SKIN: Warm and dry[AS1.6]     Emergency Department Course     ECG:[AS1.1]  ECG taken at[AS1.3] 0348[AS1.7], ECG read at[AS1.3] 0422  Sinus rhythm with 1st degree AV block  No significant change compared to EKG dated 5/22/2017  Otherwise normal ECG[AS1.7]  Rate[AS1.3] 97[AS1.7] bpm. NH interval[AS1.3] 220[AS1.7]. QRS duration[AS1.3] 88[AS1.7]. QT/QTc[AS1.3] 358/454[AS1.7]. P-R-T axes[AS1.3] 0  31  -3[AS1.7].[AS1.3]     Imaging:  Radiology findings were communicated with the[AS1.1] patient[AS1.3] who voiced understanding of the findings.[AS1.1]    Chest XR, PA & LAT[AS1.3]  IMPRESSION: No evidence of active cardiopulmonary disease.[AS1.4]  Reading per radiology.[AS1.3]     Laboratory:  Laboratory findings were communicated with the[AS1.1] patient[AS1.3] who voiced understanding of the findings.[AS1.1]  Troponin (Collected[AS1.3] 0417[AS1.8]):[AS1.3] 0.016[AS1.8]  CBC:[AS1.3] WBC: 12.0(H), RBC: 3.44(L), HGB: 10.2(L), HCT: 32.3(L), Abs monocytes: 1.5(H) o/w[AS1.7] WNL (PLT[AS1.3] 267[AS1.7])  BMP:[AS1.3] Chloride: 110(H), Glucose: 121(H), Creatinine: 1.80(H), GFR: 36(L), Calcium: 10.2(H) o/w[AS1.8] WNL    UA with Microscopic:[AS1.3] Urine blood: moderate(A), Protein albumin: 100(A), Leukocyte esterase: large(A), WBC: 46(H), RBC: 49(H), Bacteria: many(A) o/w WNL[AS1.6]    Interventions:[AS1.1]  0400 Duoneb 6 mLs Nebulization[AS1.3]  0422 Prednisone 60 mg Oral[AS1.8]     Emergency Department Course:  Nursing notes and vitals reviewed.  I performed an exam of the patient as documented above.[AS1.1]   IV was inserted and blood was drawn for laboratory testing, results above.  EKG obtained in the ED, see results above.    The patient was sent for a XR while in the emergency department, results above.    The patient provided a urine sample here in the emergency department. This was sent for laboratory testing, findings above.    0408: I  rechecked the patient.[AS1.3]   0411: Bladder scan showed 211 mLs[AS1.9]  0520: I rechecked the patient.[AS1.10]   0559: I rechecked the patient.[AS1.11]  0608: I spoke with Dr. Lion Patel who accepted the patient to an observation bed.[AS1.12]   0615: I updated the patient.[AS1.13]     I discussed the treatment plan with the patient. They expressed understanding of this plan and consented to admission. I discussed the patient with Dr. Patel, who will admit the patient to a monitored bed for further evaluation and treatment.[AS1.12]     I personally reviewed the laboratory results with the[AS1.1] patient[AS1.12] and answered all related questions prior to[AS1.1] admission[AS1.12].    Impression & Plan      Medical Decision Making:[AS1.1]  Patient presents from [facility] [place] with need for replacement of rutledge catheter. After 2 attempts, the rutledge catheter was replaced with return of ada urine. UA was sent, but it was difficult to interpret in the setting of chronic indwelling catheter, thus UA was sent for cx and abx were withheld. His lower abdominal pain improved after catheter placement. He was noted to have hypoxia in the ER which has not been present at prior medical encounters per chart review. Upon discussion with RN home and pts son over the phone, neither party was aware of any hypoxia issues in the past. He has no chart documented hx of reactive airway disease or COPD. Pt's hypoxemia did not respond to 3 duo nebs in the ER. CXR was without signs PNA, PTX, edema. Considered PE as well but has CKD so CT chest with contrast could not be obtained. Discussed with Dr Patel and plan to admit to observation for V/Q scan and further evaluation of pt's hypoxemia. Pt awaiting inpatient bed upon signout to Dr Aguayo.[EL1.1]     Diagnosis:[AS1.1]    ICD-10-CM    1. Hypoxemia R09.02    2. Urinary catheter change required Z46.6[AS1.5]       Disposition:[AS1.1]   Admitted to observation[AS1.12]    Scribe Disclosure:  Meredith MARISCAL  Lulú am serving as a scribe at 3:24 AM on 9/27/2018 to document services personally performed by Brady Pimentel MD, based on my observations and the provider's statements to me.     Meredith Chino  9/27/2018   [facility] EMERGENCY DEPARTMENT[AS1.1]       Brady Pimentel MD  09/27/18 0632  [EL1.2]     Revision History        User Key Date/Time User Provider Type Action    > EL1.2 9/27/2018  6:32 AM Brady Pimentel MD Physician Sign     EL1.1 9/27/2018  6:26 AM Brady Pimentel MD Physician      AS1.13 9/27/2018  6:15 AM Rooseveltman, Meredith Scribe Share     AS1.5 9/27/2018  6:12 AM Siefferman, Meredith Scribe Share     AS1.12 9/27/2018  6:09 AM Siefferman, Meredith Scribe      AS1.11 9/27/2018  6:00 AM Siefferman, Meredith Scribe Share     AS1.4 9/27/2018  5:35 AM Siefferman, Meredith Scribe Share     [N/A] 9/27/2018  5:22 AM Siefferman, Meredith Scribe Share     AS1.10 9/27/2018  5:20 AM Siefferman, Meredith Scribe Share     AS1.6 9/27/2018  5:17 AM Siefferman, Meredith Scribe      AS1.8 9/27/2018  4:43 AM Siefferman, Meredith Scribe Share     AS1.7 9/27/2018  4:32 AM Siefferman, Meredith Scribe Share     [N/A] 9/27/2018  4:12 AM Siefferman, Meredith Scribe Share     AS1.9 9/27/2018  4:11 AM Siefferman, Meredith Scribe Share     AS1.3 9/27/2018  4:08 AM Siefferman, Meredith Scribe      [N/A] 9/27/2018  3:50 AM Siefferman, Meredith Scribe Share     AS1.2 9/27/2018  3:45 AM Siefferman, Meredith Scribe Share     AS1.1 9/27/2018  3:24 AM Meredith Chino                   Procedure Notes     No notes of this type exist for this encounter.      Progress Notes - Therapies (Notes from 09/25/18 through 09/28/18)     No notes of this type exist for this encounter.                                          INTERAGENCY TRANSFER FORM - LAB / IMAGING / EKG / EMG RESULTS   9/27/2018                       [facility] OBSERVATION DEPARTMENT: [phone]            Unresulted  Labs     None         Lab Results - 3 Days      Urine Culture [[id number]] (Abnormal)  Resulted: 09/28/18 1308, Result status: Preliminary result    Ordering provider: Brady Pimentel MD  09/27/18 0509 Resulting lab: [facility]FECTIOUS DISEASE DI[facility]NOSTIC LABORATORY    Specimen Information    Type Source Collected On   Catheterized Urine Urine catheter 09/27/18 0450          Components       Value Reference Range Flag Lab   Specimen Description Catheterized Urine      Special Requests Specimen received in preservative   75   Culture Micro --  A 225   Result:         >100,000 colonies/mL  Enterococcus faecalis     Culture Micro --  A 225   Result:         10,000 to 50,000 colonies/mL  Strain 2  Gram positive cocci  No further identification     Culture Micro --  A 225   Result:         10,000 to 50,000 colonies/mL  Gram negative rods  No further identification     Culture Micro --  A 225   Result:         10,000 to 50,000 colonies/mL  Gram positive bacilli resembling diphtheroids  No further identification              CBC with platelets differential [[id number]] (Abnormal)  Resulted: 09/28/18 0743, Result status: Final result    Ordering provider: Lion Patel MD  09/28/18 0651 Resulting lab: [facility]    Specimen Information    Type Source Collected On     09/28/18 0651          Components       Value Reference Range Flag Lab   WBC 8.0 4.0 - 11.0 10e9/L  FrRdHs   RBC Count 3.24 4.4 - 5.9 10e12/L L FrRdHs   Hemoglobin 9.4 13.3 - 17.7 g/dL L FrRdHs   Hematocrit 30.8 40.0 - 53.0 % L FrRdHs   MCV 95 78 - 100 fl  FrRdHs   MCH 29.0 26.5 - 33.0 pg  FrRdHs   MCHC 30.5 31.5 - 36.5 g/dL L FrRdHs   RDW 14.9 10.0 - 15.0 %  FrRdHs   Platelet Count 230 150 - 450 10e9/L  FrRdHs   Diff Method Automated Method   FrRdHs   % Neutrophils 61.2 %  FrRdHs   % Lymphocytes 22.1 %  FrRdHs   % Monocytes 16.3 %  FrRdHs   % Eosinophils 0.0 %  FrRdHs   % Basophils 0.1 %  FrRdHs   % Immature Granulocytes 0.3 %  FrRdHs   Nucleated RBCs 0 0  /100  FrRdHs   Absolute Neutrophil 4.9 1.6 - 8.3 10e9/L  FrRdHs   Absolute Lymphocytes 1.8 0.8 - 5.3 10e9/L  FrRdHs   Absolute Monocytes 1.3 0.0 - 1.3 10e9/L  FrRdHs   Absolute Eosinophils 0.0 0.0 - 0.7 10e9/L  FrRdHs   Absolute Basophils 0.0 0.0 - 0.2 10e9/L  FrRdHs   Abs Immature Granulocytes 0.0 0 - 0.4 10e9/L  FrRdHs   Absolute Nucleated RBC 0.0   FrRdHs            Basic metabolic panel [[id number]] (Abnormal)  Resulted: 09/28/18 0738, Result status: Final result    Ordering provider: Lion Patel MD  09/28/18 0000 Resulting lab: [facility]    Specimen Information    Type Source Collected On   Blood  09/28/18 0651          Components       Value Reference Range Flag Lab   Sodium 141 133 - 144 mmol/L  FrRdHs   Potassium 4.6 3.4 - 5.3 mmol/L  FrRdHs   Chloride 113 94 - 109 mmol/L H FrRdHs   Carbon Dioxide 20 20 - 32 mmol/L  FrRdHs   Anion Gap 8 3 - 14 mmol/L  FrRdHs   Glucose 105 70 - 99 mg/dL H FrRdHs   Urea Nitrogen 38 7 - 30 mg/dL H FrRdHs   Creatinine 1.85 0.66 - 1.25 mg/dL H FrRdHs   GFR Estimate 35 >60 mL/min/1.7m2 L FrRdHs   Comment:  Non  GFR Calc   GFR Estimate If Black 42 >60 mL/min/1.7m2 L FrRdHs   Comment:  African American GFR Calc   Calcium 10.2 8.5 - 10.1 mg/dL H FrRdHs            D dimer quantitative [[id number]] (Abnormal)  Resulted: 09/27/18 0641, Result status: Final result    Ordering provider: Brady Pimentel MD  09/27/18 0416 Resulting lab: [facility]    Specimen Information    Type Source Collected On     09/27/18 0417          Components       Value Reference Range Flag Lab   D Dimer 0.6 0.0 - 0.50 ug/ml FEU H FrRdHs   Comment:         This D-dimer assay is intended for use in conjunction with a clinical pretest   probability assessment model to exclude pulmonary embolism (PE) and deep   venous thrombosis (DVT) in outpatients suspected of PE or DVT. The cut-off   value is 0.5 ug/mL FEU.              UA with Microscopic [[id number]] (Abnormal)   Resulted: 09/27/18 0507, Result status: Final result    Ordering provider: Brady Pimentel MD  09/27/18 0341 Resulting lab: [facility]    Specimen Information    Type Source Collected On   Catheterized Urine Urine catheter 09/27/18 0450          Components       Value Reference Range Flag Lab   Color Urine Yellow   FrRdHs   Appearance Urine Slightly Cloudy   FrRdHs   Glucose Urine Negative NEG^Negative mg/dL  FrRdHs   Bilirubin Urine Negative NEG^Negative  FrRdHs   Ketones Urine Negative NEG^Negative mg/dL  FrRdHs   Specific Gravity Urine 1.011 1.003 - 1.035  FrRdHs   Blood Urine Moderate NEG^Negative A FrRdHs   pH Urine 7.0 5.0 - 7.0 pH  FrRdHs   Protein Albumin Urine 100 NEG^Negative mg/dL A FrRdHs   Urobilinogen mg/dL 0.0 0.0 - 2.0 mg/dL  FrRdHs   Nitrite Urine Negative NEG^Negative  FrRdHs   Leukocyte Esterase Urine Large NEG^Negative A FrRdHs   Source Catheterized Urine   FrRdHs   WBC Urine 46 0 - 5 /HPF H FrRdHs   RBC Urine 49 0 - 2 /HPF H FrRdHs   Bacteria Urine Many NEG^Negative /HPF A FrRdHs            Troponin I [[id number]]  Resulted: 09/27/18 0441, Result status: Final result    Ordering provider: Brady Pimentel MD  09/27/18 0341 Resulting lab: [facility]    Specimen Information    Type Source Collected On   Blood  09/27/18 0417          Components       Value Reference Range Flag Lab   Troponin I ES 0.016 0.000 - 0.045 ug/L  [facility]   Comment:         The 99th percentile for upper reference range is 0.045 ug/L.  Troponin values   in the range of 0.045 - 0.120 ug/L may be associated with risks of adverse   clinical events.              Basic metabolic panel (BMP) [[id number]] (Abnormal)  Resulted: 09/27/18 0441, Result status: Final result    Ordering provider: Brady Pimentel MD  09/27/18 0341 Resulting lab: [facility]    Specimen Information    Type Source Collected On   Blood  09/27/18 0417          Components       Value Reference Range Flag Lab   Sodium  139 133 - 144 mmol/L  FrRdHs   Potassium 4.4 3.4 - 5.3 mmol/L  FrRdHs   Chloride 110 94 - 109 mmol/L H FrRdHs   Carbon Dioxide 20 20 - 32 mmol/L  FrRdHs   Anion Gap 9 3 - 14 mmol/L  FrRdHs   Glucose 121 70 - 99 mg/dL H FrRdHs   Urea Nitrogen 30 7 - 30 mg/dL  FrRdHs   Creatinine 1.80 0.66 - 1.25 mg/dL H FrRdHs   GFR Estimate 36 >60 mL/min/1.7m2 L FrRdHs   Comment:  Non  GFR Calc   GFR Estimate If Black 43 >60 mL/min/1.7m2 L FrRdHs   Comment:  African American GFR Calc   Calcium 10.2 8.5 - 10.1 mg/dL H FrRdHs            CBC + differential [[id number]] (Abnormal)  Resulted: 09/27/18 0424, Result status: Final result    Ordering provider: Brady Pimentel MD  09/27/18 0341 Resulting lab: [facility]    Specimen Information    Type Source Collected On   Blood  09/27/18 2817          Components       Value Reference Range Flag Lab   WBC 12.0 4.0 - 11.0 10e9/L H FrRdHs   RBC Count 3.44 4.4 - 5.9 10e12/L L FrRdHs   Hemoglobin 10.2 13.3 - 17.7 g/dL L FrRdHs   Hematocrit 32.3 40.0 - 53.0 % L FrRdHs   MCV 94 78 - 100 fl  FrRdHs   MCH 29.7 26.5 - 33.0 pg  FrRdHs   MCHC 31.6 31.5 - 36.5 g/dL  FrRdHs   RDW 14.9 10.0 - 15.0 %  FrRdHs   Platelet Count 267 150 - 450 10e9/L  FrRdHs   Diff Method Automated Method   FrRdHs   % Neutrophils 69.0 %  FrRdHs   % Lymphocytes 18.3 %  FrRdHs   % Monocytes 12.2 %  FrRdHs   % Eosinophils 0.0 %  FrRdHs   % Basophils 0.3 %  FrRdHs   % Immature Granulocytes 0.2 %  FrRdHs   Nucleated RBCs 0 0 /100  FrRdHs   Absolute Neutrophil 8.3 1.6 - 8.3 10e9/L  FrRdHs   Absolute Lymphocytes 2.2 0.8 - 5.3 10e9/L  FrRdHs   Absolute Monocytes 1.5 0.0 - 1.3 10e9/L H FrRdHs   Absolute Eosinophils 0.0 0.0 - 0.7 10e9/L  FrRdHs   Absolute Basophils 0.0 0.0 - 0.2 10e9/L  FrRdHs   Abs Immature Granulocytes 0.0 0 - 0.4 10e9/L  FrRdHs   Absolute Nucleated RBC 0.0   FrRdHs            Testing Performed By     Lab - Abbreviation Name Director Address Valid Date Range    12 - Fr[facility] [place]  [facility] Unknown [address]  [facility] [address] 05/08/15 1057 - Present    75 - Unknown [facility] Unknown [address] [facility] [address] 01/15/15 1019 - Present    225 - Unknown [facility] Unknown [address] [facility] [address] 12/19/14 0954 - Present               Imaging Results - 3 Days      Chest XR,  PA & LAT [[id number]]  Resulted: 09/27/18 0535, Result status: Final result    Ordering provider: Brady Pimentel MD  09/27/18 0341 Resulted by: Sharad Luke MD    Performed: 09/27/18 0504 - 09/27/18 0512 Resulting lab: RADIOLOGY RESULTS    Narrative:       CHEST 2 VIEWS  9/27/2018 5:12 AM     HISTORY: Hypoxia. Wheezing.    COMPARISON: 5/22/2017.    FINDINGS: A linear opacity in the mid right lung likely represents  scarring or atelectasis. The lungs are otherwise clear. Mild  cardiomegaly. Atherosclerotic calcification in the thoracic aorta.      Impression:       IMPRESSION: No evidence of active cardiopulmonary disease.    SHARAD LUKE MD      Testing Performed By     Lab - Abbreviation Name Director Address Valid Date Range    104 - Rad Rslts RADIOLOGY RESULTS Unknown Unknown 02/16/05 1553 - Present            Encounter-Level Documents:     There are no encounter-level documents.      Order-Level Documents:     There are no order-level documents.

## 2018-09-27 NOTE — ED PROVIDER NOTES
"  History     Chief Complaint:  Catheter Problem    The history is provided by the patient, the EMS personnel and the nursing home.      Edil Lopez is a 87 year old male, with history of dementia, chronic kidney disease, cerebral infarction, hypertension,diverticulitis among others who presents for evaluation of a catheter problem. He was seen here in the ED in 7/2018 for catheter problems when he had a CT and was found to have diverticulitis. He lives at [facility], where he was having his rutledge catheter changed as it was not clogged and not flushing. When placing the new catheter they were unable to advance it. He presents with EMS who report that he is at his baseline mentation, and normal respiratory status. On arrival he is wheezing, and oxygen saturation is 86%. He was placed on oxygen via nasal cannula. He states he is not normally on oxygen at his nursing home. He has pain in his lower abdomen and groin, and states that it feels \"uncomfortable.\" Denies cough, fever, vomiting, diarrhea, chest pain, or difficulty breathing. He gets around normally in a wheelchair.    Allergies:  Lisinopril  Penicillins     Medications:    Tylenol  Amiodarone  Amlodipine  Ducolax  Apresoline  Ativan  Roxicodone  Protonix  Miralax  Senna-docusate     Past Medical History:    Anemia  Benign prostatic hyperplasia  Chronic kidney disease  Cerebral infarction  Dementia  Diverticulitis  GERD  Hyperparathyroidism  Hypertension  Hyperthyroidism  Neuromuscular disorder  Peripheral vascular disease    Past Surgical History:    Appendectomy open  Endarterectomy carotid  Hernia repair  Suprapubic catheter placement    Family History:    No past pertinent family history.     Social History:  Relationship status:   Tobacco use: Former  Alcohol use: No  The patient presents via ambulance.    Marital Status:   [5]     Review of Systems   Respiratory: Positive for wheezing. Negative for cough and shortness of breath. "    Cardiovascular: Negative for chest pain.   Gastrointestinal: Positive for abdominal pain. Negative for diarrhea, nausea and vomiting.   All other systems reviewed and are negative.    Physical Exam     Patient Vitals for the past 24 hrs:   BP Temp Temp src Heart Rate Resp SpO2   09/27/18 0600 114/67 - - - - 92 %   09/27/18 0545 118/59 - - - - 94 %   09/27/18 0500 111/63 - - - - 91 %   09/27/18 0445 (!) 139/99 - - - - -   09/27/18 0430 126/65 - - - - -   09/27/18 0415 (!) 141/110 - - - - -   09/27/18 0400 - - - - - 97 %   09/27/18 0328 (!) 140/98 - - - - 92 %   09/27/18 0327 - 98.2  F (36.8  C) Oral 96 18 (!) 88 %        Physical Exam  VS: Reviewed per above  HENT: Mucous membranes moist  EYES: sclera anicteric  CV: Rate as noted, regular rhythm.   RESP: Effort normal. insp wheezing  GI: lower tenderness, not distended.  : hypospadias, scant blood near meatus  NEURO: Alert, moving all extremities  MSK: No deformity of the extremities  SKIN: Warm and dry     Emergency Department Course     ECG:  ECG taken at 0348, ECG read at 0422  Sinus rhythm with 1st degree AV block  No significant change compared to EKG dated 5/22/2017  Otherwise normal ECG  Rate 97 bpm. IA interval 220. QRS duration 88. QT/QTc 358/454. P-R-T axes 0  31  -3.     Imaging:  Radiology findings were communicated with the patient who voiced understanding of the findings.    Chest XR, PA & LAT  IMPRESSION: No evidence of active cardiopulmonary disease.  Reading per radiology.     Laboratory:  Laboratory findings were communicated with the patient who voiced understanding of the findings.  Troponin (Collected 0417): 0.016  CBC: WBC: 12.0(H), RBC: 3.44(L), HGB: 10.2(L), HCT: 32.3(L), Abs monocytes: 1.5(H) o/w WNL ()  BMP: Chloride: 110(H), Glucose: 121(H), Creatinine: 1.80(H), GFR: 36(L), Calcium: 10.2(H) o/w WNL    UA with Microscopic: Urine blood: moderate(A), Protein albumin: 100(A), Leukocyte esterase: large(A), WBC: 46(H), RBC: 49(H),  Bacteria: many(A) o/w WNL    Interventions:  0400 Duoneb 6 mLs Nebulization  0422 Prednisone 60 mg Oral     Emergency Department Course:  Nursing notes and vitals reviewed.  I performed an exam of the patient as documented above.   IV was inserted and blood was drawn for laboratory testing, results above.  EKG obtained in the ED, see results above.    The patient was sent for a XR while in the emergency department, results above.    The patient provided a urine sample here in the emergency department. This was sent for laboratory testing, findings above.    0408: I rechecked the patient.   0411: Bladder scan showed 211 mLs  0520: I rechecked the patient.   0559: I rechecked the patient.  0608: I spoke with Dr. Lion Patel who accepted the patient to an observation bed.   0615: I updated the patient.     I discussed the treatment plan with the patient. They expressed understanding of this plan and consented to admission. I discussed the patient with Dr. Patel, who will admit the patient to a monitored bed for further evaluation and treatment.     I personally reviewed the laboratory results with the patient and answered all related questions prior to admission.    Impression & Plan      Medical Decision Making:  Patient presents from [facility] [place] with need for replacement of rutledge catheter. After 2 attempts, the rutledge catheter was replaced with return of ada urine. UA was sent, but it was difficult to interpret in the setting of chronic indwelling catheter, thus UA was sent for cx and abx were withheld. His lower abdominal pain improved after catheter placement. He was noted to have hypoxia in the ER which has not been present at prior medical encounters per chart review. Upon discussion with RN home and pts son over the phone, neither party was aware of any hypoxia issues in the past. He has no chart documented hx of reactive airway disease or COPD. Pt's hypoxemia did not respond to 3 duo nebs in the ER. CXR was without  signs PNA, PTX, edema. Considered PE as well but has CKD so CT chest with contrast could not be obtained. Discussed with Dr Patel and plan to admit to observation for V/Q scan and further evaluation of pt's hypoxemia. Pt awaiting inpatient bed upon signout to Dr Aguayo.     Diagnosis:    ICD-10-CM    1. Hypoxemia R09.02    2. Urinary catheter change required Z46.6       Disposition:   Admitted to observation    Scribe Disclosure:  I, Meredith Chino, am serving as a scribe at 3:24 AM on 9/27/2018 to document services personally performed by Brady Pimentel MD, based on my observations and the provider's statements to me.     Meredith Chino  9/27/2018   [facility] EMERGENCY DEPARTMENT       Brady Pimentel MD  09/27/18 0632

## 2018-09-27 NOTE — CONSULTS
Discharge Planner   Discharge Plans in progress: Pt resides at [facility], call placed to verify bedhold, bedhold currently in place. Per provider, pt will likely discharge back to facility tomorrow w/ new oxygen orders. Updated facility.   Barriers to discharge plan: overnight oximetry study Follow up plan: CTS to follow-up re discharge planning after care rounds tomorrow.        Entered by: Marizol Villela 09/27/2018 1:31 PM

## 2018-09-27 NOTE — ED NOTES
[facility]  ED Nurse Handoff Report    Edil Lopez is a 87 year old male   ED Chief complaint: Catheter Problem  . ED Diagnosis:   Final diagnoses:   Hypoxemia   Urinary catheter change required     Allergies:   Allergies   Allergen Reactions     Lisinopril      Penicillins        Code Status: not on file  Activity level - Baseline/Home:  Total Care. Activity Level - Current:   Total Care. Lift room needed: No. Bariatric: No   Needed: No   Isolation: No. Infection: Not Applicable.     Vital Signs:   Vitals:    09/27/18 0445 09/27/18 0500 09/27/18 0545 09/27/18 0600   BP: (!) 139/99 111/63 118/59 114/67   Resp:       Temp:       TempSrc:       SpO2:  91% 94% 92%       Cardiac Rhythm:  ,      Pain level:    Patient confused: Yes. Patient Falls Risk: Yes.   Elimination Status: Has chronic rutledge in place   Patient Report - Initial Complaint: Catheter probelm. Focused Assessment: Pt comes from [facility].    Staff from nursing home was unable to flush catheter tonight, removed his catheter, and was unable to place another one, prompting his ED visit.  While here, pt has been consistently hypoxic.  Has continued to desaturate when attempting to trial off oxygen.  Pt dips to approx 83-87% on room air.  Stable oxygen saturations on 2 L.  Pt had inspiratory and expiratory wheezes upon arrival, wheezes subsided after nebulizers.  Pt has no diagnosed chronic lung issues, denies any shortness of breath, staff at nursing home states they have not had issues with pts oxygen.    Unable to contact pts legal guardian.  ABCs intact, pt has dementia at baseline.  Tests Performed:   Labs Ordered and Resulted from Time of ED Arrival Up to the Time of Departure from the ED   ROUTINE UA WITH MICROSCOPIC - Abnormal; Notable for the following:        Result Value    Blood Urine Moderate (*)     Protein Albumin Urine 100 (*)     Leukocyte Esterase Urine Large (*)     WBC Urine 46 (*)     RBC Urine 49 (*)      Bacteria Urine Many (*)     All other components within normal limits   CBC WITH PLATELETS DIFFERENTIAL - Abnormal; Notable for the following:     WBC 12.0 (*)     RBC Count 3.44 (*)     Hemoglobin 10.2 (*)     Hematocrit 32.3 (*)     Absolute Monocytes 1.5 (*)     All other components within normal limits   BASIC METABOLIC PANEL - Abnormal; Notable for the following:     Chloride 110 (*)     Glucose 121 (*)     Creatinine 1.80 (*)     GFR Estimate 36 (*)     GFR Estimate If Black 43 (*)     Calcium 10.2 (*)     All other components within normal limits   TROPONIN I   URINE CULTURE AEROBIC BACTERIAL     Chest XR,  PA & LAT   Final Result   IMPRESSION: No evidence of active cardiopulmonary disease.      GEETA GARCIA MD          Treatments provided: See MAR  Family Comments: none present, updated son via telephone  OBS brochure/video discussed/provided to patient:  N/A - pt confused  ED Medications:   Medications   lidocaine (XYLOCAINE) 2 % topical gel (not administered)   ipratropium - albuterol 0.5 mg/2.5 mg/3 mL (DUONEB) neb solution 6 mL (6 mLs Nebulization Given 9/27/18 0400)   predniSONE (DELTASONE) tablet 60 mg (60 mg Oral Given 9/27/18 0422)     Drips infusing:  No  For the majority of the shift, the patient's behavior Green. Interventions performed were Rounding.     Severe Sepsis OR Septic Shock Diagnosis Present: No      ED Nurse Name/Phone Number: Chiquita LISSETH Pereiraon,   6:13 AM    RECEIVING UNIT ED HANDOFF REVIEW    Above ED Nurse Handoff Report was reviewed: yes  Reviewed by: Emily Roman on September 27, 2018 at 7:37 AM

## 2018-09-27 NOTE — PLAN OF CARE
Problem: Patient Care Overview  Goal: Plan of Care/Patient Progress Review  ROOM # 225    Living Situation (if not independent, order SW consult):   Facility name: [facility]   : [facility]    Activity level at baseline: Lift  Activity level on admit: Lift       Patient registered to observation; given Patient Bill of Rights; given the opportunity to ask questions about observation status and their plan of care.  Patient has been oriented to the observation room, bathroom and call light is in place.    Discussed discharge goals and expectations with patient/family.

## 2018-09-27 NOTE — ED NOTES
Pt continued to drop oxygen saturations to mid 80's.  Repositioned pt to sit more upright.  MD at bedside.  Placed back on 2 L of O2 NC

## 2018-09-27 NOTE — PLAN OF CARE
Problem: Patient Care Overview  Goal: Plan of Care/Patient Progress Review  Spoke with MLAINDA Chirinos from [facility] and update given- [phone]

## 2018-09-27 NOTE — PLAN OF CARE
Problem: Patient Care Overview  Goal: Plan of Care/Patient Progress Review  Message left for Raulito at [phone] to do admission questions.

## 2018-09-27 NOTE — ED NOTES
Trialing pt off supplemental oxygen.  Pt continues to dip O2 sats to mid to high 80's, and then will go back up to 90-93%.  MD is aware.  Pt does not wear oxygen at home

## 2018-09-27 NOTE — IP AVS SNAPSHOT
[facility] Observation Department    [address]    [facility] [address]    Phone:  [phone]                                       After Visit Summary   9/27/2018    Edil Lopez    MRN: [id number]           After Visit Summary Signature Page     I have received my discharge instructions, and my questions have been answered. I have discussed any challenges I see with this plan with the nurse or doctor.    ..........................................................................................................................................  Patient/Patient Representative Signature      ..........................................................................................................................................  Patient Representative Print Name and Relationship to Patient    ..................................................               ................................................  Date                                   Time    ..........................................................................................................................................  Reviewed by Signature/Title    ...................................................              ..............................................  Date                                               Time          22EPIC Rev 08/18

## 2018-09-27 NOTE — IP AVS SNAPSHOT
` `     [facility] OBSERVATION DEPARTMENT: [phone]            Medication Administration Report for Edil Lopez as of 09/28/18 9754   Legend:    Given Hold Not Given Due Canceled Entry Other Actions    Time Time (Time) Time  Time-Action       Inactive    Active    Linked        Medications 09/22/18 09/23/18 09/24/18 09/25/18 09/26/18 09/27/18 09/28/18    acetaminophen (TYLENOL) Suppository 650 mg  Dose: 650 mg  Freq: EVERY 4 HOURS PRN Route: RE  PRN Reason: mild pain  Start: 09/27/18 0856   Admin Instructions: Alternate ibuprofen (if ordered) with acetaminophen.  Maximum acetaminophen dose from all sources = 75 mg/kg/day not to exceed 4 grams/day.    Admin. Amount: 1 suppository (1 × 650 mg suppository)  Dispense Loc:  Main Pharmacy               acetaminophen (TYLENOL) tablet 1,000 mg  Dose: 1,000 mg  Freq: 3 TIMES DAILY Route: PO  Start: 09/27/18 0857   Admin Instructions: Maximum acetaminophen dose from all sources = 75 mg/kg/day not to exceed 4 gram    Admin. Amount: 2 tablet (2 × 500 mg tablet)  Last Admin: 09/28/18 0754  Dispense Loc:  ADS MS2B OBS          1115 (1,000 mg)-Given       1934 (1,000 mg)-Given        0327 (1,000 mg)-Given       0754 (1,000 mg)-Given       [ ] 1900           acetaminophen (TYLENOL) tablet 650 mg  Dose: 650 mg  Freq: EVERY 4 HOURS PRN Route: PO  PRN Reason: mild pain  Start: 09/27/18 0856   Admin Instructions: Alternate ibuprofen (if ordered) with acetaminophen.  Maximum acetaminophen dose from all sources = 75 mg/kg/day not to exceed 4 grams/day.    Admin. Amount: 2 tablet (2 × 325 mg tablet)  Dispense Loc:  ADS MS2B OBS               amiodarone (PACERONE/CODARONE) half-tab 100 mg  Dose: 100 mg  Freq: DAILY Route: PO  Start: 09/27/18 0857   Admin Instructions: Avoid grapefruit juice during oral amiodarone treatment.    Admin. Amount: 1 half-tab (1 × 100 mg half-tab)  Last Admin: 09/28/18 0755  Dispense Loc:  ADS MS2B OBS          1115 (100 mg)-Given         0755 (100 mg)-Given           amLODIPine (NORVASC) tablet 10 mg  Dose: 10 mg  Freq: DAILY Route: PO  Start: 09/27/18 0857   Admin. Amount: 1 tablet (1 × 10 mg tablet)  Last Admin: 09/28/18 0754  Dispense Loc: YAM[facility]X PEREZB OBS          1115 (10 mg)-Given        0754 (10 mg)-Given           hydrALAZINE (APRESOLINE) half-tab 12.5 mg  Dose: 12.5 mg  Freq: 3 TIMES DAILY Route: PO  Start: 09/28/18 1400   Admin Instructions: Hold if SBP <110    Admin. Amount: 1 half-tab (1 × 12.5 mg half-tab)  Last Admin: 09/28/18 1655  Dispense Loc: YAMILEX MATIAS MS2B OBS           1655 (12.5 mg)-Given       [ ] 2000           ipratropium - albuterol 0.5 mg/2.5 mg/3 mL (DUONEB) neb solution 3 mL  Dose: 3 mL  Freq: EVERY 4 HOURS PRN Route: NEBULIZATION  PRN Reason: wheezing  Start: 09/28/18 0834   Admin. Amount: 3 mL  Last Admin: 09/28/18 0920  Dispense Loc: YAMILEX ADS MS2B OBS  Volume: 3 mL           0920 (3 mL)-Given           levothyroxine (SYNTHROID/LEVOTHROID) tablet 50 mcg  Dose: 50 mcg  Freq: DAILY Route: PO  Start: 09/28/18 0800   Admin Instructions: Separate oral administration of iron- or calcium-containing products and levothyroxine by at least 4 hours.    Admin. Amount: 1 tablet (1 × 50 mcg tablet)  Last Admin: 09/28/18 1004  Dispense Loc: YAMILEX ADS MS2B OBS           1004 (50 mcg)-Given           melatonin tablet 1 mg  Dose: 1 mg  Freq: AT BEDTIME PRN Route: PO  PRN Reason: sleep  Start: 09/27/18 0856   Admin Instructions: Do not give unless at least 6 hours of uninterrupted sleep is expected.    Admin. Amount: 1 tablet (1 × 1 mg tablet)  Dispense Loc: YAM[facility] ADS MS2B OBS               miconazole (MICATIN; MICRO GUARD) 2 % powder  Freq: EVERY 1 HOUR PRN Route: Top  PRN Reason: other  PRN Comment: topical candidiasis  Start: 09/27/18 1047   Admin Instructions: Apply to affected area.      Last Admin: 09/28/18 0803  Dispense Loc:  Main Pharmacy          1321 ( )-Given        0803 ( )-Given           naloxone (NARCAN) injection 0.1-0.4  mg  Dose: 0.1-0.4 mg  Freq: EVERY 2 MIN PRN Route: IV  PRN Reason: opioid reversal  Start: 09/27/18 0856   Admin Instructions: For respiratory rate LESS than or EQUAL to 8.  Partial reversal dose:  0.1 mg titrated q 2 minutes for Analgesia Side Effects Monitoring Sedation Level of 3 (frequently drowsy, arousable, drifts to sleep during conversation).Full reversal dose:  0.4 mg bolus for Analgesia Side Effects Monitoring Sedation Level of 4 (somnolent, minimal or no response to stimulation).  For ordered IV doses 0.1-2mg give IVP. Give each 0.4mg over 15 seconds in emergency situations. For non-emergent situations further dilute in 9mL of NS to facilitate titration of response.    Admin. Amount: 0.1-0.4 mg = 0.25-1 mL Conc: 0.4 mg/mL  Dispense Loc: RH ADS MS2B OBS  Volume: 1 mL               nitroGLYcerin (NITROSTAT) sublingual tablet 0.4 mg  Dose: 0.4 mg  Freq: EVERY 5 MIN PRN Route: SL  PRN Reason: chest pain  Start: 09/27/18 0856   Admin Instructions: Maximum 3 doses in 15 minutes.  Notify provider if no relief after 3 doses.    Do NOT give nitroglycerin SL IF the patient has taken avanafil (STENDRA), sildenafil (VIAGRA) or (REVATIO) within the last 8 hours, vardenafil (LEVITRA) or (STAXYN) within the last 18 hours, tadalafil (CIALIS) or (ADCIRCA) within the last 36 hours. Inform provider if patient has taken one of these medications.  If patient is still having acute angina requiring treatment, an alternative treatment option may be used such as: IV beta-blocker [2.5 mg - 5 mg metoprolol (LOPRESSOR)] if ordered by a provider.    Admin. Amount: 1 tablet (1 × 0.4 mg tablet)  Dispense Loc: RH ADS MS2B OBS               oxyCODONE IR (ROXICODONE) half-tab 2.5 mg  Dose: 2.5 mg  Freq: EVERY 4 HOURS PRN Route: PO  PRN Reason: moderate to severe pain  Start: 09/27/18 0856   Admin. Amount: 1 half-tab (1 × 2.5 mg half-tab)  Dispense Loc: RH ADS MS2B OBS               ranitidine (ZANTAC) tablet 75 mg  Dose: 75 mg  Freq:  DAILY Route: PO  Start: 18 0800   Admin. Amount: 1 tablet (1 × 75 mg tablet)  Last Admin: 18 1025  Dispense Loc: RH ADS MS2B OBS           1025 (75 mg)-Given           senna-docusate (SENOKOT-S;PERICOLACE) 8.6-50 MG per tablet 1 tablet  Dose: 1 tablet  Freq: 2 TIMES DAILY PRN Route: PO  PRN Reason: constipation  Start: 18 0856   Admin Instructions: If no bowel movement in 24 hours, increase to 2 tablets PO.  Hold for loose stools.  This is the first step of a three step constipation treatment.    Admin. Amount: 1 tablet  Dispense Loc: RH ADS MS2B OBS                     Or  senna-docusate (SENOKOT-S;PERICOLACE) 8.6-50 MG per tablet 2 tablet  Dose: 2 tablet  Freq: 2 TIMES DAILY PRN Route: PO  PRN Reason: constipation  Start: 1856   Admin Instructions: Hold for loose stools.  This is the first step of a three step constipation treatment.    Admin. Amount: 2 tablet  Last Admin: 18 100  Dispense Loc: RH ADS MS2B OBS           1005 (2 tablet)-Given          Completed Medications  Medications 18         Dose: 6 mL  Freq: ONCE Route: NEBULIZATION  Start: 18 0342   End: 18 0400   Admin. Amount: 6 mL  Last Admin: 18 0400  Dispense Loc: RH ADS ERA  Administrations Remainin  Volume: 6 mL          0400 (6 mL)-Given              Dose: 60 mg  Freq: ONCE Route: PO  Start: 182   End: 182   Admin. Amount: 3 tablet (3 × 20 mg tablet)  Last Admin: 18  Dispense Loc: RH ADS ERA  Administrations Remainin          0422 (60 mg)-Given           Discontinued Medications  Medications 18         Dose: 650 mg  Freq: EVERY 4 HOURS PRN Route: RE  PRN Reason: mild pain  Start: 18 0856   End: 18 1030   Admin Instructions: Alternate ibuprofen (if ordered) with acetaminophen.  Maximum acetaminophen dose from all sources = 75  "mg/kg/day not to exceed 4 grams/day.    Admin. Amount: 1 suppository (1 × 650 mg suppository)          1030-Med Discontinued          Dose: 650 mg  Freq: EVERY 4 HOURS PRN Route: PO  PRN Reason: mild pain  Start: 18   End: 18 103   Admin Instructions: Alternate ibuprofen (if ordered) with acetaminophen.  Maximum acetaminophen dose from all sources = 75 mg/kg/day not to exceed 4 grams/day.    Admin. Amount: 2 tablet (2 × 325 mg tablet)          1030-Med Discontinued          Dose: 100 mg  Freq: DAILY Route: PO  Start: 18 0800   End: 18 08   Admin Instructions: Avoid grapefruit juice during oral amiodarone treatment.    Admin. Amount: 1 half-tab (1 × 100 mg half-tab)                  809-Med Discontinued         Dose: 12.5 mg  Freq: EVERY 8 HOURS Route: PO  Start: 18   End: 18 0810   Admin. Amount: 1 half-tab (1 × 12.5 mg half-tab)  Last Admin: 18 075  Dispense Loc:  ADS MS2B OBS          (1311)-Not Given [C]       1630 (12.5 mg)-Given       2358 (12.5 mg)-Given        0755 (12.5 mg)-Given       0810-Med Discontinued  0857-Hold [C]             Freq: EVERY 1 HOUR PRN Route: Top  PRN Reason: pain  PRN Comment: with VAD insertion or accessing implanted port.  Start: 18   End: 18 120   Admin Instructions: Do NOT give if patient has a history of allergy to any local anesthetic or any \"herman\" product.   Apply 30 minutes prior to VAD insertion or port access.  MAX Dose:  2.5 g (  of 5 g tube)    Dispense Loc: RH ADS MS2B OBS          1206-Med Discontinued          Start: 18   End: 18 08   Admin Instructions: Chiquita Urbano : cabinet override    Administrations Remainin                 809-Med Discontinued          Dose: 1 mL  Freq: EVERY 1 HOUR PRN Route: OTHER  PRN Comment: mild pain with VAD insertion or accessing implanted port  Start: 18   End: 18 1206   Admin Instructions: Do NOT give if patient has a " "history of allergy to any local anesthetic or any \"herman\" product. MAX dose 1 mL subcutaneous OR intradermal in divided doses.    Admin. Amount: 1 mL  Dispense Loc: [facility] Floor Stock  Volume: 2 mL          1206-Med Discontinued          Dose: 1 mg  Freq: AT BEDTIME PRN Route: PO  PRN Reason: sleep  Start: 09/27/18 0856   End: 09/27/18 1030   Admin Instructions: Do not give unless at least 6 hours of uninterrupted sleep is expected.    Admin. Amount: 1 tablet (1 × 1 mg tablet)          1030-Med Discontinued          Dose: 0.1-0.4 mg  Freq: EVERY 2 MIN PRN Route: IV  PRN Reason: opioid reversal  Start: 09/27/18 0856   End: 09/27/18 1030   Admin Instructions: For respiratory rate LESS than or EQUAL to 8.  Partial reversal dose:  0.1 mg titrated q 2 minutes for Analgesia Side Effects Monitoring Sedation Level of 3 (frequently drowsy, arousable, drifts to sleep during conversation).Full reversal dose:  0.4 mg bolus for Analgesia Side Effects Monitoring Sedation Level of 4 (somnolent, minimal or no response to stimulation).  For ordered IV doses 0.1-2mg give IVP. Give each 0.4mg over 15 seconds in emergency situations. For non-emergent situations further dilute in 9mL of NS to facilitate titration of response.    Admin. Amount: 0.1-0.4 mg = 0.25-1 mL Conc: 0.4 mg/mL  Volume: 1 mL          1030-Med Discontinued          Dose: 4 mg  Freq: EVERY 6 HOURS PRN Route: PO  PRN Reasons: nausea,vomiting  Start: 09/27/18 0856   End: 09/27/18 1030   Admin Instructions: This is Step 1 of nausea and vomiting management.  If nausea not resolved in 15 minutes, go to Step 2 prochlorperazine (COMPAZINE). Do not push through foil backing. Peel back foil and gently remove. Place on tongue immediately. Administration with liquid unnecessary  With dry hands, peel back foil backing and gently remove tablet; do not push oral disintegrating tablet through foil backing; administer immediately on tongue and oral disintegrating tablet dissolves in " seconds; then swallow with saliva; liquid not required.    Admin. Amount: 1 tablet (1 × 4 mg tablet)          1030-Med Discontinued       Or    Dose: 4 mg  Freq: EVERY 6 HOURS PRN Route: IV  PRN Reasons: nausea,vomiting  Start: 09/27/18 0856   End: 09/27/18 1030   Admin Instructions: This is Step 1 of nausea and vomiting management.  If nausea not resolved in 15 minutes, go to Step 2 prochlorperazine (COMPAZINE).  Irritant. For ordered IV doses 0.1-4 mg, give IV Push undiluted over 2-5 minutes.    Admin. Amount: 4 mg = 2 mL Conc: 4 mg/2 mL  Infused Over: 2-5 Minutes  Volume: 2 mL          1030-Med Discontinued          Dose: 4 mg  Freq: EVERY 6 HOURS PRN Route: PO  PRN Reasons: nausea,vomiting  Start: 09/27/18 0856   End: 09/27/18 1101   Admin Instructions: This is Step 1 of nausea and vomiting management.  If nausea not resolved in 15 minutes, go to Step 2 prochlorperazine (COMPAZINE). Do not push through foil backing. Peel back foil and gently remove. Place on tongue immediately. Administration with liquid unnecessary  With dry hands, peel back foil backing and gently remove tablet; do not push oral disintegrating tablet through foil backing; administer immediately on tongue and oral disintegrating tablet dissolves in seconds; then swallow with saliva; liquid not required.    Admin. Amount: 1 tablet (1 × 4 mg tablet)  Dispense Loc: RH ADS MS2B OBS          1101-Med Discontinued       Or    Dose: 4 mg  Freq: EVERY 6 HOURS PRN Route: IV  PRN Reasons: nausea,vomiting  Start: 09/27/18 0856   End: 09/27/18 1101   Admin Instructions: This is Step 1 of nausea and vomiting management.  If nausea not resolved in 15 minutes, go to Step 2 prochlorperazine (COMPAZINE).  Irritant. For ordered IV doses 0.1-4 mg, give IV Push undiluted over 2-5 minutes.    Admin. Amount: 4 mg = 2 mL Conc: 4 mg/2 mL  Dispense Loc: RH ADS MS2B OBS  Infused Over: 2-5 Minutes  Volume: 2 mL          1101-Med Discontinued          Dose: 3 mL  Freq:  EVERY 8 HOURS Route: IK  Start: 09/27/18 0857   End: 09/27/18 1206   Admin Instructions: And Q1H PRN, to lock peripheral IV dormant line.    Admin. Amount: 3 mL  Dispense Loc: [facility] Floor Stock  Volume: 4 mL                 1206-Med Discontinued          Dose: 3 mL  Freq: EVERY 1 HOUR PRN Route: IK  PRN Reason: line flush  PRN Comment: for peripheral IV flush post IV meds  Start: 09/27/18 0856   End: 09/27/18 1206   Admin. Amount: 3 mL  Dispense Loc: [facility] Floor Stock  Volume: 4 mL          1206-Med Discontinued     Medications 09/22/18 09/23/18 09/24/18 09/25/18 09/26/18 09/27/18 09/28/18

## 2018-09-27 NOTE — PLAN OF CARE
"Problem: Patient Care Overview  Goal: Plan of Care/Patient Progress Review  PRIMARY DIAGNOSIS: Catheter problem/Hypoxia   OUTPATIENT/OBSERVATION GOALS TO BE MET BEFORE DISCHARGE:  1. ADLs back to baseline: Yes- uses lift baseline      2. Activity and level of assistance: Up with maximum assistance.    3. Pain status: Improved-controlled with oral pain medications. Catheter insertion site pain \"moderate\". Getting scheduled tylenol.      4. Return to near baseline physical activity: Yes      Discharge Planner Nurse   Safe discharge environment identified: Yes  Barriers to discharge: No       Entered by: Emily Roman 09/27/2018 1:05 PM  Please review provider order for any additional goals.   Nurse to notify provider when observation goals have been met and patient is ready for discharge.     Patient able to say he's in the hospital for difficult voiding with right president but wrong year. Hx dementia- bed alarm on but not setting off. Patient is needing 2L when he sleeps because he dips to mid to upper 80s on RA. Snoring. Patient frequently taking off NC. Uses craig lift baseline. SL.  Continuous pulse ox on. Catheter in that is very odorous, ada, with sediment but with good urine output. SW following.       "

## 2018-09-27 NOTE — ED NOTES
Attempted to contact pts guardian to gain insight on whether or not to admit pt for low oxygen, due to pt being asymptomatic and having previous notes in chart stating he would rather have comfort cares.  The number for guardian is an office number whose voicemail says they open at 9am.  Left voicemail to call back ASAP.    Called nursing home staff to gain insight on whether or not for low oxygen.  Staff is unaware of any low oxygen issues.  Confirmed that the number we have for guardian is the same number they have.    Called son as well who stated to leave it up to a doctor to admit him.    MD aware.

## 2018-09-27 NOTE — IP AVS SNAPSHOT
MRN:[id number]                      After Visit Summary   9/27/2018    Edil Lopez    MRN: [id number]           Thank you!     Thank you for choosing [facility] for your care. Our goal is always to provide you with excellent care. Hearing back from our patients is one way we can continue to improve our services. Please take a few minutes to complete the written survey that you may receive in the mail after you visit. If you would like to speak to someone directly about your visit please contact Patient Relations at [phone]. Thank you!          Patient Information     Date Of Birth          7/21/1931        About your hospital stay     You were admitted on:  September 27, 2018 You last received care in the:  [facility] Observation Department    You were discharged on:  September 28, 2018        Reason for your hospital stay       You were admitted due to concerns for low oxygen levels while being seen in the emergency room due to low urine output from your rutledge catheter. Your rutledge was exchanged in the ED without any concerns since then. Your urine was checked and you current having bacteria growing in your urine but this appears to be related to colonization since there are no other signs of an active infection at this time. You will not be treated with antibiotics. It appears that your low oxygen levels are related to undiagnosed sleep apnea and suspect chronic lung changes from previous tobacco use due to your oxygen saturations dropping when you fall asleep. You will be discharged with supplemental oxygen to wear at night and recommend you have an outpatient sleep study.                  Who to Call     For medical emergencies, please call 911.  For non-urgent questions about your medical care, please call your primary care provider or clinic, [phone]          Attending Provider     Provider Specialty    Brady Pimentel MD Emergency Medicine    Her,  "[facility]       Primary Care Provider Office Phone # Fax #    Shirley Toscano [phone]      After Care Instructions     Activity       Your activity upon discharge: activity as tolerated            Diet       Follow this diet upon discharge: Orders Placed This Encounter      Combination Diet Dysphagia Diet Level 2: Mechan Altered; Nectar Thickened Liquids (pre-thickened or use instant food thickener)            Fall precautions           General info for SNF       Length of Stay Estimate: Long Term Care  Condition at Discharge: Stable  Level of care:board and care  Rehabilitation Potential: N/A  Admission H&P remains valid and up-to-date: Yes  Recent Chemotherapy: N/A  Use Nursing Home Standing Orders: Yes            Oxygen Adult       Wear 1-2 L while sleeping or napping to maintain oxygen saturations >90%                  Follow-up Appointments     Follow-up and recommended labs and tests        Follow up with primary care provider, Shirley Toscano, within 7 days for hospital follow- up.  The following labs/tests are recommended: sleep study to assess for sleep apnea.                             Pending Results     Date and Time Order Name Status Description    9/27/2018 0509 Urine Culture Preliminary             Statement of Approval     Ordered          09/28/18 6928  I have reviewed and agree with all the recommendations and orders detailed in this document.  EFFECTIVE NOW     Approved and electronically signed by:  Zoe Angeles PA-C             Admission Information     Date & Time Provider Department Dept. Phone    9/27/2018 Lion Patel MD [facility] Observation Department [phone]      Your Vitals Were     Blood Pressure Temperature Respirations Height Weight Pulse Oximetry    116/63 (BP Location: Right arm) 96.5  F (35.8  C) (Oral) 16 1.727 m (5' 8\") 74.8 kg (164 lb 12.8 oz) 95%    BMI (Body Mass Index)                   25.06 kg/m2         " "  MyChart Information     [facility] lets you send messages to your doctor, view your test results, renew your prescriptions, schedule appointments and more. To sign up, go to [email or web address] . Click on \"Log in\" on the left side of the screen, which will take you to the Welcome page. Then click on \"Sign up Now\" on the right side of the page.     You will be asked to enter the access code listed below, as well as some personal information. Please follow the directions to create your username and password.     Your access code is: [email or web address]  Expires: 2018  2:01 PM     Your access code will  in 90 days. If you need help or a new code, please call your [facility] clinic or [phone].        Care EveryWhere ID     This is your Care EveryWhere ID. This could be used by other organizations to access your [facility] medical records  [id number]        Equal Access to Services     WILLIAM ACHARYA : Rhys connell Soellen, waluceroda meliton, qaybta kaalmaaddison adeeusebia, arianna christensen . So [facility] [phone].    ATENCIÓN: Si kasi bauman, tiene a jeffery disposición servicios gratuitos de asistencia lingüística. Llame al [phone].    We comply with applicable federal civil rights laws and [place] laws. We do not discriminate on the basis of race, color, national origin, age, disability, sex, sexual orientation, or gender identity.               Review of your medicines      CONTINUE these medicines which have NOT CHANGED        Dose / Directions    acetaminophen 500 MG tablet   Commonly known as:  TYLENOL   Used for:  Other chronic pain        Dose:  1000 mg   Take 2 tablets (1,000 mg) by mouth 3 times daily 0800, 1400, 2200   Quantity:  1 Bottle   Refills:  0       AMIODARONE HCL PO        Dose:  100 mg   Take 100 mg by mouth daily   Refills:  0       amLODIPine 10 MG tablet   Commonly known as:  NORVASC   Used for:  Bradycardia        Dose:  10 mg   Take 1 tablet (10 mg) by " "mouth daily   Quantity:  60 tablet   Refills:  0       Ammonium Lactate 5 % Lotn        Externally apply topically 3 times daily Apply for itching (dry skin) to \"BACK and EXTREMITIES\"   Refills:  0       calcium carbonate 500 MG chewable tablet   Commonly known as:  TUMS   Used for:  Gastroesophageal reflux disease, esophagitis presence not specified        Dose:  1 chew tab   Take 1 tablet (500 mg) by mouth 2 times daily as needed for heartburn   Refills:  0       HYDRALAZINE HCL PO        Dose:  12.5 mg   Take 12.5 mg by mouth 3 times daily   Refills:  0       levothyroxine 50 MCG tablet   Commonly known as:  SYNTHROID/LEVOTHROID        Dose:  50 mcg   Take 50 mcg by mouth daily   Refills:  0       magnesium hydroxide 400 MG/5ML suspension   Commonly known as:  MILK OF MAGNESIA   Used for:  Drug-induced constipation        Dose:  30 mL   Take 30 mLs by mouth daily as needed for constipation or heartburn   Quantity:  105 mL   Refills:  0       oxyCODONE IR 5 MG tablet   Commonly known as:  ROXICODONE   Used for:  Other chronic pain        Dose:  2.5 mg   Take 0.5 tablets (2.5 mg) by mouth 4 times daily Hold for sedation   Quantity:  5 tablet   Refills:  0       polyethylene glycol Packet   Commonly known as:  MIRALAX/GLYCOLAX   Used for:  Drug-induced constipation        Dose:  17 g   Take 17 g by mouth daily Hold for loose stools   Quantity:  7 packet   Refills:  0       ranitidine 75 MG tablet   Commonly known as:  ZANTAC        Dose:  75 mg   Take 75 mg by mouth daily   Refills:  0       senna-docusate 8.6-50 MG per tablet   Commonly known as:  SENOKOT-S;PERICOLACE   Used for:  Drug-induced constipation        Dose:  2 tablet   Take 2 tablets by mouth daily Hold for loose stools   Refills:  0            Where to get your medicines      Some of these will need a paper prescription and others can be bought over the counter. Ask your nurse if you have questions.     Bring a paper prescription for each of these " medications     oxyCODONE IR 5 MG tablet                Protect others around you: Learn how to safely use, store and throw away your medicines at www.disposemymeds.org.        Information about OPIOIDS     PRESCRIPTION OPIOIDS: WHAT YOU NEED TO KNOW   We gave you an opioid (narcotic) pain medicine. It is important to manage your pain, but opioids are not always the best choice. You should first try all the other options your care team gave you. Take this medicine for as short a time (and as few doses) as possible.    Some activities can increase your pain, such as bandage changes or therapy sessions. It may help to take your pain medicine 30 to 60 minutes before these activities. Reduce your stress by getting enough sleep, working on hobbies you enjoy and practicing relaxation or meditation. Talk to your care team about ways to manage your pain beyond prescription opioids.    These medicines have risks:    DO NOT drive when on new or higher doses of pain medicine. These medicines can affect your alertness and reaction times, and you could be arrested for driving under the influence (DUI). If you need to use opioids long-term, talk to your care team about driving.    DO NOT operate heavy machinery    DO NOT do any other dangerous activities while taking these medicines.    DO NOT drink any alcohol while taking these medicines.     If the opioid prescribed includes acetaminophen, DO NOT take with any other medicines that contain acetaminophen. Read all labels carefully. Look for the word  acetaminophen  or  Tylenol.  Ask your pharmacist if you have questions or are unsure.    You can get addicted to pain medicines, especially if you have a history of addiction (chemical, alcohol or substance dependence). Talk to your care team about ways to reduce this risk.    All opioids tend to cause constipation. Drink plenty of water and eat foods that have a lot of fiber, such as fruits, vegetables, prune juice, apple juice and  "high-fiber cereal. Take a laxative (Miralax, milk of magnesia, Colace, Senna) if you don t move your bowels at least every other day. Other side effects include upset stomach, sleepiness, dizziness, throwing up, tolerance (needing more of the medicine to have the same effect), physical dependence and slowed breathing.    Store your pills in a secure place, locked if possible. We will not replace any lost or stolen medicine. If you don t finish your medicine, please throw away (dispose) as directed by your pharmacist. The [place] Pollution [employer] has more information about safe disposal: https://www.pca.[facility].mn.us/living-green/managing-unwanted-medications             Medication List: This is a list of all your medications and when to take them. Check marks below indicate your daily home schedule. Keep this list as a reference.      Medications           Morning Afternoon Evening Bedtime As Needed    acetaminophen 500 MG tablet   Commonly known as:  TYLENOL   Take 2 tablets (1,000 mg) by mouth 3 times daily 0800, 1400, 2200   Last time this was given:  1,000 mg on 9/28/2018  7:54 AM                                AMIODARONE HCL PO   Take 100 mg by mouth daily                                amLODIPine 10 MG tablet   Commonly known as:  NORVASC   Take 1 tablet (10 mg) by mouth daily   Last time this was given:  10 mg on 9/28/2018  7:54 AM                                Ammonium Lactate 5 % Lotn   Externally apply topically 3 times daily Apply for itching (dry skin) to \"BACK and EXTREMITIES\"                                calcium carbonate 500 MG chewable tablet   Commonly known as:  TUMS   Take 1 tablet (500 mg) by mouth 2 times daily as needed for heartburn                                HYDRALAZINE HCL PO   Take 12.5 mg by mouth 3 times daily                                levothyroxine 50 MCG tablet   Commonly known as:  SYNTHROID/LEVOTHROID   Take 50 mcg by mouth daily   Last time this was given:  50 mcg " on 9/28/2018 10:04 AM                                magnesium hydroxide 400 MG/5ML suspension   Commonly known as:  MILK OF MAGNESIA   Take 30 mLs by mouth daily as needed for constipation or heartburn                                oxyCODONE IR 5 MG tablet   Commonly known as:  ROXICODONE   Take 0.5 tablets (2.5 mg) by mouth 4 times daily Hold for sedation                                polyethylene glycol Packet   Commonly known as:  MIRALAX/GLYCOLAX   Take 17 g by mouth daily Hold for loose stools                                ranitidine 75 MG tablet   Commonly known as:  ZANTAC   Take 75 mg by mouth daily   Last time this was given:  75 mg on 9/28/2018 10:25 AM                                senna-docusate 8.6-50 MG per tablet   Commonly known as:  SENOKOT-S;PERICOLACE   Take 2 tablets by mouth daily Hold for loose stools   Last time this was given:  2 tablets on 9/28/2018 10:05 AM

## 2018-09-27 NOTE — PROGRESS NOTES
Hospitalist Addendum:    Pt admitted earlier this morning for urinary catheter problem (no urine outpt). S/p exchange and urinary issue resolved. However he was found to have episodes of hypoxia down to the mid 80's but this is while he was sleeping or dozing. His sats will come up to the mid 90's when he's awake and talking. He was admitted for observation.     Here on the floor pt was found to be sleeping and has visible signs of apnea. His sats was 87% but after I woke him up, his sats went to 93-96%. He denies hx of MIMI but has never had sleep study. He denies any hx of PE, or DVT and denies any SOB or pleuritic CP.   His D-dimer is minimally elevated at 0.6 but that can certainly be age appropriate and due to CKD.     At this point, I suspect his sxs is related to undx MIMI. Will place him on pulseOx overnight and supplemental O2.   Low suspicion for cardiopulm cause of hypoxia yanira it only happens while he is sleeping.  Plan for supplemental O2 at night at [facility] and rec outpt sleep study.     Also nurse states malodorous urine. Has hx of UTI and indwelling cath but no infectious signs. Will hold on abx and wait for cx.

## 2018-09-27 NOTE — ED NOTES
"Pt given x3 nebulizers with mild improvement in wheezes.  Pt states he feels less short of breath now, but feels \"stuffed up.\"  Writer and Kvng, EMT attempted rutledge x2 without success.  Pt complaining of pain in his penis.  Used urojet for insertion.  MD updated.  "

## 2018-09-27 NOTE — PHARMACY-ADMISSION MEDICATION HISTORY
"Admission medication history interview status for this patient is complete. See [facility] admission navigator for allergy information, prior to admission medications and immunization status.     Medication history interview source(s): No interview, complete info via [facility]  Medication history resources (including written lists, pill bottles, clinic record): [facility]    Changes made to PTA medication list:  Added: ammonium lactate lotion 5%, ranitidine, levothyroxine  Deleted: bisacodyl PRN (no usage). Also, no longer active: diclofenac topical gel, lorazepam oral solution, pantoprazole  Changed: amiodarone from 200 to 100 mg daily, hydralazine from 25 to 12.5 mg TID    Actions taken by pharmacist (provider contacted, etc):None     Additional medication history information:None    Medication reconciliation/reorder completed by provider prior to medication history? Yes    Do you take OTC medications (eg tylenol, ibuprofen, fish oil, eye/ear drops, etc)? Y(Y/N)    For patients on insulin therapy: N (Y/N)    Prior to Admission medications    Medication Sig Last Dose Taking? Auth Provider   acetaminophen (TYLENOL) 500 MG tablet Take 2 tablets (1,000 mg) by mouth 3 times daily 0800, 1400, 2200 9/26/2018 at pm Yes Oxana Garza APRN CNS   AMIODARONE HCL PO Take 100 mg by mouth daily 9/26/2018 at am Yes Unknown, Entered By History   amLODIPine (NORVASC) 10 MG tablet Take 1 tablet (10 mg) by mouth daily 9/26/2018 at am Yes Stanislaw Franco MD   Ammonium Lactate 5 % LOTN Externally apply topically 3 times daily Apply for itching (dry skin) to \"BACK and EXTREMITIES\" 9/26/2018 at pm Yes Unknown, Entered By History   calcium carbonate (TUMS) 500 MG chewable tablet Take 1 tablet (500 mg) by mouth 2 times daily as needed for heartburn Past Week at Unknown time Yes Oxana Garza APRN CNS   HYDRALAZINE HCL PO Take 12.5 mg by mouth 3 times daily 9/26/2018 at pm Yes Unknown, Entered By History   levothyroxine " (SYNTHROID/LEVOTHROID) 50 MCG tablet Take 50 mcg by mouth daily 9/26/2018 at am Yes Unknown, Entered By History   oxyCODONE (ROXICODONE) 5 MG IR tablet Take 0.5 tablets (2.5 mg) by mouth 4 times daily Hold for sedation 9/27/2018 at 00:10 Yes Oxana Garza APRN CNS   polyethylene glycol (MIRALAX/GLYCOLAX) Packet Take 17 g by mouth daily Hold for loose stools 9/26/2018 at am Yes Oxana Garza APRN CNS   ranitidine (ZANTAC) 75 MG tablet Take 75 mg by mouth daily 9/26/2018 at am Yes Unknown, Entered By History   senna-docusate (SENOKOT-S;PERICOLACE) 8.6-50 MG per tablet Take 2 tablets by mouth daily Hold for loose stools 9/26/2018 at am Yes Oxana Garza APRN CNS   magnesium hydroxide (MILK OF MAGNESIA) 400 MG/5ML suspension Take 30 mLs by mouth daily as needed for constipation or heartburn More than a month at May '18  Oxana Garza APRN CNS

## 2018-09-27 NOTE — PROGRESS NOTES
ROOM # 225    Living Situation (if not independent, order SW consult): not ind   Facility name:  : will look up    Activity level at baseline: walker? Pt unble able to answerr  Activity level on admit: Lift?       Patient registered to observation; given Patient Bill of Rights; given the opportunity to ask questions about observation status and their plan of care.  Patient has been oriented to the observation room, bathroom and call light is in place.    Discussed discharge goals and expectations with patient/family.

## 2018-09-27 NOTE — PLAN OF CARE
"Problem: Patient Care Overview  Goal: Plan of Care/Patient Progress Review  PRIMARY DIAGNOSIS: Catheter problem/Hypoxia   OUTPATIENT/OBSERVATION GOALS TO BE MET BEFORE DISCHARGE:  1. ADLs back to baseline: Yes- uses lift baseline    2. Activity and level of assistance: Up with maximum assistance. Consider SW and/or PT evaluation.     3. Pain status: Improved-controlled with oral pain medications. Catheter insertion site pain \"moderate\". Getting scheduled tylenol.     4. Return to near baseline physical activity: Yes     Discharge Planner Nurse   Safe discharge environment identified: Yes  Barriers to discharge: No       Entered by: Emily Roman 09/27/2018 1:05 PM     Please review provider order for any additional goals.   Nurse to notify provider when observation goals have been met and patient is ready for discharge.    Patient able to say he's in the hospital for difficult voiding with right president but wrong year. Hx dementia- bed alarm on but not setting off. Uses craig lift baseline. SL. Patient is on RA- does dip down to mid 80s occassionally. Continuous pulse ox on. Catheter in that is very odorous, ada, with sediment. No consults in.       "

## 2018-09-27 NOTE — ED TRIAGE NOTES
"Pt presents to ED via EMS from [facility].  Pt states staff tried to \"flush catheter\" twice and \"had no luck.\"  Baseline dementia.  ABCs intact.  "

## 2018-09-28 VITALS
OXYGEN SATURATION: 95 % | TEMPERATURE: 96.5 F | WEIGHT: 164.8 LBS | DIASTOLIC BLOOD PRESSURE: 89 MMHG | SYSTOLIC BLOOD PRESSURE: 148 MMHG | BODY MASS INDEX: 24.98 KG/M2 | RESPIRATION RATE: 16 BRPM | HEIGHT: 68 IN

## 2018-09-28 LAB
ANION GAP SERPL CALCULATED.3IONS-SCNC: 8 MMOL/L (ref 3–14)
BASOPHILS # BLD AUTO: 0 10E9/L (ref 0–0.2)
BASOPHILS NFR BLD AUTO: 0.1 %
BUN SERPL-MCNC: 38 MG/DL (ref 7–30)
CALCIUM SERPL-MCNC: 10.2 MG/DL (ref 8.5–10.1)
CHLORIDE SERPL-SCNC: 113 MMOL/L (ref 94–109)
CO2 SERPL-SCNC: 20 MMOL/L (ref 20–32)
CREAT SERPL-MCNC: 1.85 MG/DL (ref 0.66–1.25)
DIFFERENTIAL METHOD BLD: ABNORMAL
EOSINOPHIL # BLD AUTO: 0 10E9/L (ref 0–0.7)
EOSINOPHIL NFR BLD AUTO: 0 %
ERYTHROCYTE [DISTWIDTH] IN BLOOD BY AUTOMATED COUNT: 14.9 % (ref 10–15)
GFR SERPL CREATININE-BSD FRML MDRD: 35 ML/MIN/1.7M2
GLUCOSE SERPL-MCNC: 105 MG/DL (ref 70–99)
HCT VFR BLD AUTO: 30.8 % (ref 40–53)
HGB BLD-MCNC: 9.4 G/DL (ref 13.3–17.7)
IMM GRANULOCYTES # BLD: 0 10E9/L (ref 0–0.4)
IMM GRANULOCYTES NFR BLD: 0.3 %
LYMPHOCYTES # BLD AUTO: 1.8 10E9/L (ref 0.8–5.3)
LYMPHOCYTES NFR BLD AUTO: 22.1 %
MCH RBC QN AUTO: 29 PG (ref 26.5–33)
MCHC RBC AUTO-ENTMCNC: 30.5 G/DL (ref 31.5–36.5)
MCV RBC AUTO: 95 FL (ref 78–100)
MONOCYTES # BLD AUTO: 1.3 10E9/L (ref 0–1.3)
MONOCYTES NFR BLD AUTO: 16.3 %
NEUTROPHILS # BLD AUTO: 4.9 10E9/L (ref 1.6–8.3)
NEUTROPHILS NFR BLD AUTO: 61.2 %
NRBC # BLD AUTO: 0 10*3/UL
NRBC BLD AUTO-RTO: 0 /100
PLATELET # BLD AUTO: 230 10E9/L (ref 150–450)
POTASSIUM SERPL-SCNC: 4.6 MMOL/L (ref 3.4–5.3)
RBC # BLD AUTO: 3.24 10E12/L (ref 4.4–5.9)
SODIUM SERPL-SCNC: 141 MMOL/L (ref 133–144)
WBC # BLD AUTO: 8 10E9/L (ref 4–11)

## 2018-09-28 PROCEDURE — 36415 COLL VENOUS BLD VENIPUNCTURE: CPT | Performed by: INTERNAL MEDICINE

## 2018-09-28 PROCEDURE — 25000132 ZZH RX MED GY IP 250 OP 250 PS 637: Performed by: INTERNAL MEDICINE

## 2018-09-28 PROCEDURE — G0378 HOSPITAL OBSERVATION PER HR: HCPCS

## 2018-09-28 PROCEDURE — 25000132 ZZH RX MED GY IP 250 OP 250 PS 637: Performed by: PHYSICIAN ASSISTANT

## 2018-09-28 PROCEDURE — 40000275 ZZH STATISTIC RCP TIME EA 10 MIN

## 2018-09-28 PROCEDURE — 94640 AIRWAY INHALATION TREATMENT: CPT

## 2018-09-28 PROCEDURE — 25000125 ZZHC RX 250: Performed by: PHYSICIAN ASSISTANT

## 2018-09-28 PROCEDURE — 85025 COMPLETE CBC W/AUTO DIFF WBC: CPT | Performed by: INTERNAL MEDICINE

## 2018-09-28 PROCEDURE — 85025 COMPLETE CBC W/AUTO DIFF WBC: CPT | Performed by: PHYSICIAN ASSISTANT

## 2018-09-28 PROCEDURE — 80048 BASIC METABOLIC PNL TOTAL CA: CPT | Performed by: INTERNAL MEDICINE

## 2018-09-28 RX ORDER — IPRATROPIUM BROMIDE AND ALBUTEROL SULFATE 2.5; .5 MG/3ML; MG/3ML
3 SOLUTION RESPIRATORY (INHALATION) EVERY 4 HOURS PRN
Status: DISCONTINUED | OUTPATIENT
Start: 2018-09-28 | End: 2018-09-28 | Stop reason: HOSPADM

## 2018-09-28 RX ORDER — LEVOTHYROXINE SODIUM 50 UG/1
50 TABLET ORAL DAILY
Status: DISCONTINUED | OUTPATIENT
Start: 2018-09-28 | End: 2018-09-28 | Stop reason: HOSPADM

## 2018-09-28 RX ORDER — OXYCODONE HYDROCHLORIDE 5 MG/1
2.5 TABLET ORAL 4 TIMES DAILY
Qty: 5 TABLET | Refills: 0 | Status: SHIPPED | OUTPATIENT
Start: 2018-09-28

## 2018-09-28 RX ADMIN — AMLODIPINE BESYLATE 10 MG: 10 TABLET ORAL at 07:54

## 2018-09-28 RX ADMIN — AMIODARONE HYDROCHLORIDE 100 MG: 200 TABLET ORAL at 07:55

## 2018-09-28 RX ADMIN — ACETAMINOPHEN 1000 MG: 500 TABLET, FILM COATED ORAL at 07:54

## 2018-09-28 RX ADMIN — RANITIDINE 75 MG: 75 TABLET, FILM COATED ORAL at 10:25

## 2018-09-28 RX ADMIN — ACETAMINOPHEN 1000 MG: 500 TABLET, FILM COATED ORAL at 03:27

## 2018-09-28 RX ADMIN — IPRATROPIUM BROMIDE AND ALBUTEROL SULFATE 3 ML: .5; 3 SOLUTION RESPIRATORY (INHALATION) at 09:20

## 2018-09-28 RX ADMIN — HYDRALAZINE HYDROCHLORIDE 12.5 MG: 25 TABLET ORAL at 16:55

## 2018-09-28 RX ADMIN — LEVOTHYROXINE SODIUM 50 MCG: 50 TABLET ORAL at 10:04

## 2018-09-28 RX ADMIN — HYDRALAZINE HYDROCHLORIDE 12.5 MG: 25 TABLET ORAL at 07:55

## 2018-09-28 RX ADMIN — SENNOSIDES AND DOCUSATE SODIUM 2 TABLET: 8.6; 5 TABLET ORAL at 10:05

## 2018-09-28 RX ADMIN — MICONAZOLE NITRATE: 2 POWDER TOPICAL at 08:03

## 2018-09-28 NOTE — PROVIDER NOTIFICATION
Paged hospitalist regarding tele called d/t pt converting to A-fib from SR. This is a known condition, pt asymptomatic. Will continue to monitor.

## 2018-09-28 NOTE — PLAN OF CARE
Problem: Patient Care Overview  Goal: Plan of Care/Patient Progress Review  PRIMARY DIAGNOSIS: Catheter problem/Hypoxia   OUTPATIENT/OBSERVATION GOALS TO BE MET BEFORE DISCHARGE:  1. ADLs back to baseline: Yes- uses lift baseline                       2. Activity and level of assistance: Up with maximum assistance.     3. Pain status: Denies pain- receiving scheduled tylenol       4. Return to near baseline physical activity: Yes      Discharge Planner Nurse   Safe discharge environment identified: Yes  Barriers to discharge: No     Please review provider order for any additional goals.   Nurse to notify provider when observation goals have been met and patient is ready for discharge.      Pt disoriented to time. Bed alarm on but not setting off.  On RA- patient dips to upper 80s while sleeping and awake. Eun PONCE aware and order prn nebs. Denies SOB and chest x-ray neg. Occasional cough. Will trial prn nebs.  Patient does frequently fall asleep though but awakes to voice. Continuous pulse ox applied. Patient uses craig lift baseline. From [facility] term [facility]- Care coordinator saw yesterday. On tele- a.fib/a flutter with occ pvcs 80s-100s. Urine orange and very odorous through cath. Mechanical soft diet with necker thick liquids- food has to be ordered but good appeitite. No consults or further tests ordered. Swollows meds fine whole.

## 2018-09-28 NOTE — PLAN OF CARE
Problem: Patient Care Overview  Goal: Plan of Care/Patient Progress Review  PRIMARY DIAGNOSIS: Catheter problem/Hypoxia   OUTPATIENT/OBSERVATION GOALS TO BE MET BEFORE DISCHARGE:  1. ADLs back to baseline: Yes- uses lift baseline      2. Activity and level of assistance: Up with maximum assistance.    3. Pain status: Improved-controlled with oral pain medications. Catheter insertion site pain at times      4. Return to near baseline physical activity: Yes      Discharge Planner Nurse   Safe discharge environment identified: Yes  Barriers to discharge: No    Please review provider order for any additional goals.   Nurse to notify provider when observation goals have been met and patient is ready for discharge.     Pt disoriented to time, vitally stable on 2L O2, sats 98%. Denies SOB/pain overnight. Baseline craig lift, repositioning frequently. Ortiz catheter with adequate drainage, urine very foul smelling and cloudy. Edema to BLE's. Will continue to monitor.

## 2018-09-28 NOTE — PLAN OF CARE
Problem: Patient Care Overview  Goal: Plan of Care/Patient Progress Review  PRIMARY DIAGNOSIS: Catheter problem/Hypoxia   OUTPATIENT/OBSERVATION GOALS TO BE MET BEFORE DISCHARGE:  1. ADLs back to baseline: Yes- uses lift baseline      2. Activity and level of assistance: Up with maximum assistance.    3. Pain status: Improved-controlled with oral pain medications. Catheter insertion site pain at times      4. Return to near baseline physical activity: Yes      Discharge Planner Nurse   Safe discharge environment identified: Yes  Barriers to discharge: No    Please review provider order for any additional goals.   Nurse to notify provider when observation goals have been met and patient is ready for discharge.     Pt disoriented to time, vitally stable on 2L O2, sats 97%. Continuous pulse ox on. Denies SOB or pain at this time. Bed alarm on for increased safety. Baseline craig lift, repositioning frequently. Ortiz catheter with adequate drainage, urine very foul smelling and cloudy. Edema to BLE's. Pt comes from [facility]. Will continue to monitor.

## 2018-09-28 NOTE — PLAN OF CARE
Problem: Patient Care Overview  Goal: Plan of Care/Patient Progress Review  PRIMARY DIAGNOSIS: Catheter problem/Hypoxia   OUTPATIENT/OBSERVATION GOALS TO BE MET BEFORE DISCHARGE:  1. ADLs back to baseline: Yes- uses lift baseline                       2. Activity and level of assistance: Up with maximum assistance.      3. Pain status: Denies pain- receiving scheduled tylenol       4. Return to near baseline physical activity: Yes      Discharge Planner Nurse   Safe discharge environment identified: Yes  Barriers to discharge: No      Please review provider order for any additional goals.   Nurse to notify provider when observation goals have been met and patient is ready for discharge.      Pt disoriented to time. Bed alarm on but not setting off.  On RA- patient dips to upper 80s while sleeping and awake. Prn neb given to patient- a little less wheezy afterwards but did not improve 02 saturations. Denies SOB and chest x-ray neg. Occasional cough.   Patient does frequently fall asleep though but awakes to voice. Continuous pulse ox applied. Patient uses craig lift baseline- Up to chair with lift now. From [facility] long term care- Care coordinator saw yesterday. On tele- a.fib/a flutter with occ pvcs 80s-100s. Urine orange and very odorous through cath. Mechanical soft diet with necker thick liquids- food has to be ordered but good appeitite. No consults or further tests ordered. Swollows meds fine whole.

## 2018-09-28 NOTE — PROGRESS NOTES
Discharge Planner   Discharge Plans in progress: Yes  Barriers to discharge plan: Patient is a long term care resident at [facility] and is now requiring O2.   Follow up plan: [facility] stretcher transport arranged for 6:30 today Friday September 28th to accommodate O2 need. Updated bedside RN and [facility] admissions and LTC 3rd Floor with ride time. Discharge orders faxed to [facility] [phone].        Entered by: Madhavi Charles 09/28/2018 2:06 PM

## 2018-09-28 NOTE — PLAN OF CARE
Problem: Patient Care Overview  Goal: Plan of Care/Patient Progress Review  PRIMARY DIAGNOSIS: Catheter problem/Hypoxia   OUTPATIENT/OBSERVATION GOALS TO BE MET BEFORE DISCHARGE:  1. ADLs back to baseline: Yes- uses lift baseline      2. Activity and level of assistance: Up with maximum assistance.    3. Pain status: Improved-controlled with oral pain medications. Catheter insertion site pain at times      4. Return to near baseline physical activity: Yes      Discharge Planner Nurse   Safe discharge environment identified: Yes  Barriers to discharge: No    Please review provider order for any additional goals.   Nurse to notify provider when observation goals have been met and patient is ready for discharge.     Pt disoriented to time, vitally stable on 2L O2, sats 98%. Denies SOB/pain at this time. Baseline craig lift, repositioning frequently. Ortiz catheter with adequate drainage, urine very foul smelling and cloudy. Edema to BLE's. Pt comes from [facility]. Will continue to monitor.

## 2018-09-28 NOTE — PLAN OF CARE
Problem: Patient Care Overview  Goal: Plan of Care/Patient Progress Review  Message left for legal guardian at [phone] about discharge plans. Out of office for the day.  [facility]. They asked for a oxy script and SNF paperwork that was missing. This was faxed to [phone].

## 2018-09-28 NOTE — DISCHARGE SUMMARY
[facility]    Observation Unit   Discharge Summary  Hospitalist    Date of Admission:  9/27/2018  Date of Discharge:  9/28/2018  Provider:  Eun Angeles PA-C  Date of Service (when I last saw the patient): 09/28/18    Discharge Diagnoses   Hypoxia  Acute on chronic urinary retention   Abnormal UA, suspect patient has colonization     Other medical issues:  Past Medical History:   Diagnosis Date     Anemia      BPH (benign prostatic hyperplasia)      Chronic kidney disease      CVA (cerebral infarction)      Dementia      Gastro-oesophageal reflux disease      Hyperparathyroidism (H)      Hypertension      Hypothyroidism      Neuromuscular disorder (H)      PVD (peripheral vascular disease) (H)      History of Present Illness   Edil Lopez is an 87 year old male with PMH significant for dementia, tachybradycardia syndrome, chronic urinary retention with indwelling catheter changed every 4 weeks, CKD, chronic pain syndrome, HTN, and GERD who initially presented to the ED with urinary retention and no urine output through rutledge catheter and was successfully replaced in the ED with good urine output who was admitted due to hypoxemia requiring supplemental oxygen in the ED.  Please see the admission history and physical for full details.    Hospital Course   Edil Lopez was admitted on 9/27/2018.  The following problems were addressed during his hospitalization:    1. 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 D-dimer slightly elevated at 0.6 but would be WNL based on age and CKD. No other concerns for underlying PE such as chest pain or tachycardia, no additional imaging obtained. Initially the morning of discharge the patient's oxygen saturations were dipping to the upper 80s on RA while sleeping and awake. Tried duo neb due to reports of wheezing per RN with minimal improvement in oxygen saturations.  During interview the day of discharge the patient was able to maintain oxygen saturations in the low 90s on RA when awake but upon leaving the room and the patient falling asleep his oxygen saturations would again drop to the upper 80s which would be consistent with underlying sleep apnea.   - Supplemental oxygen, 1-2 L, while sleeping or napping to maintain oxygen saturations >90%  - Recommend outpatient sleep study to assess for sleep apnea     2. Chronic urinary retention: chronic indwelling rutledge catheter, decreased output initially when in the ED and had rutledge replaced with adequate urine output since. UA abnormal with urine culture growing >100,000 enterococcus faecalis, 10-50,000 strain 2 gram positive cocci, 10-50,000 gram negative rods, and 10-50,000 gram positive bacilli resembling diphtheroids but patient is afebrile, initial leukocytosis improved to WNL prior to discharge, no tachycardia or hypotension, and denies urinary symptoms or abdominal pain. Suspect patient is colonized from chronic rutledge based on previous culture results, will not treat with antibiotics due to no other signs of underlying infection. Final urine culture can be reviewed by PCP in outpatient setting.      3. CKD: baseline Cr of 1.8, remained stable during stay with Cr of 1.85 the day of discharge    4. Dementia: appears to be at baseline. Lives in long term care at [facility]. No signs of delirium during stay. SW consulted and assisted with discharging the patient back to his facility.     5. Chronic pain syndrome: usually on scheduled Oxycodone but this was changed to as needed and recommend continuing administrating as this due to somnolence during the day causing his hypoxia which would be worsened by scheduled narcotics.     6. Tachybradycardia syndrome: stable, continue PTA Amiodarone     Pending Results   Unresulted Labs Ordered in the Past 30 Days of this Admission     Date and Time Order Name Status  "Description    9/27/2018 0509 Urine Culture Preliminary         Code Status   DNR / DNI       Primary Care Physician   Shirley Toscano    Exam:    /63 (BP Location: Right arm)  Temp 96.5  F (35.8  C) (Oral)  Resp 16  Ht 1.727 m (5' 8\")  Wt 74.8 kg (164 lb 12.8 oz)  SpO2 95%  BMI 25.06 kg/m2    GENERAL:  Comfortable.  PSYCH: pleasant, oriented to self but not place or time, no acute distress  HEENT:  PERRLA. Normal conjunctiva, normal hearing, nasal mucosa and oropharynx are normal.  NECK:  Supple, no neck vein distention, adenopathy or bruits, normal thyroid.  HEART:  Normal S1, S2 with no murmur, no pericardial rub, gallops or S3 or S4.  LUNGS: decreased breath sounds throughout, normal respiratory effort. No wheezing, rales, crackles, or ronchi.  ABDOMEN:  Soft, no hepatosplenomegaly, normal bowel sounds. Non-tender, non distended.   EXTREMITIES:  No pedal edema, +2 radial and posterior tibial pulses bilateral and equal.  SKIN:  Dry to touch, No rash, wound or ulcerations.  NEUROLOGIC:  CN 2-12 intact, BL 5/5 symmetric upper and lower extremity strength, sensation is intact with no focal deficits.     Discharge Disposition   Discharged to assisted living    Consultations This Hospital Stay   CARE COORDINATOR IP CONSULT    Time Spent on this Encounter   IZoe, personally saw the patient today and spent greater than 30 minutes discharging this patient.    Discharge Orders     Reason for your hospital stay   You were admitted due to concerns for low oxygen levels while being seen in the emergency room due to low urine output from your rutledge catheter. Your rutledge was exchanged in the ED without any concerns since then. Your urine was checked and you current having bacteria growing in your urine but this appears to be related to colonization since there are no other signs of an active infection at this time. You will not be treated with antibiotics. It appears that your low oxygen levels are " related to undiagnosed sleep apnea and suspect chronic lung changes from previous tobacco use due to your oxygen saturations dropping when you fall asleep. You will be discharged with supplemental oxygen to wear at night and recommend you have an outpatient sleep study.     Follow-up and recommended labs and tests    Follow up with primary care provider, Shirley Toscano, within 7 days for hospital follow- up.  The following labs/tests are recommended: sleep study to assess for sleep apnea.     Activity   Your activity upon discharge: activity as tolerated     General info for SNF   Length of Stay Estimate: Long Term Care  Condition at Discharge: Stable  Level of care:board and care  Rehabilitation Potential: N/A  Admission H&P remains valid and up-to-date: Yes  Recent Chemotherapy: N/A  Use Nursing Home Standing Orders: Yes     DNR/DNI     Oxygen Adult   Wear 1-2 L while sleeping or napping to maintain oxygen saturations >90%     Fall precautions     Diet   Follow this diet upon discharge: Orders Placed This Encounter     Combination Diet Dysphagia Diet Level 2: Mechan Altered; Nectar Thickened Liquids (pre-thickened or use instant food thickener)       Discharge Medications   Current Discharge Medication List      CONTINUE these medications which have CHANGED    Details   oxyCODONE IR (ROXICODONE) 5 MG tablet Take 0.5 tablets (2.5 mg) by mouth 4 times daily Hold for sedation  Qty: 5 tablet, Refills: 0    Associated Diagnoses: Other chronic pain         CONTINUE these medications which have NOT CHANGED    Details   acetaminophen (TYLENOL) 500 MG tablet Take 2 tablets (1,000 mg) by mouth 3 times daily 0800, 1400, 2200  Qty: 1 Bottle, Refills: 0    Associated Diagnoses: Other chronic pain      AMIODARONE HCL PO Take 100 mg by mouth daily      amLODIPine (NORVASC) 10 MG tablet Take 1 tablet (10 mg) by mouth daily  Qty: 60 tablet    Associated Diagnoses: Bradycardia      Ammonium Lactate 5 % LOTN Externally apply  "topically 3 times daily Apply for itching (dry skin) to \"BACK and EXTREMITIES\"      calcium carbonate (TUMS) 500 MG chewable tablet Take 1 tablet (500 mg) by mouth 2 times daily as needed for heartburn    Associated Diagnoses: Gastroesophageal reflux disease, esophagitis presence not specified      HYDRALAZINE HCL PO Take 12.5 mg by mouth 3 times daily      levothyroxine (SYNTHROID/LEVOTHROID) 50 MCG tablet Take 50 mcg by mouth daily      polyethylene glycol (MIRALAX/GLYCOLAX) Packet Take 17 g by mouth daily Hold for loose stools  Qty: 7 packet    Associated Diagnoses: Drug-induced constipation      ranitidine (ZANTAC) 75 MG tablet Take 75 mg by mouth daily      senna-docusate (SENOKOT-S;PERICOLACE) 8.6-50 MG per tablet Take 2 tablets by mouth daily Hold for loose stools    Associated Diagnoses: Drug-induced constipation      magnesium hydroxide (MILK OF MAGNESIA) 400 MG/5ML suspension Take 30 mLs by mouth daily as needed for constipation or heartburn  Qty: 105 mL    Associated Diagnoses: Drug-induced constipation           Allergies   Allergies   Allergen Reactions     Lisinopril      Penicillins      Data   Results for orders placed or performed during the hospital encounter of 09/27/18   Chest XR,  PA & LAT    Narrative    CHEST 2 VIEWS  9/27/2018 5:12 AM     HISTORY: Hypoxia. Wheezing.    COMPARISON: 5/22/2017.    FINDINGS: A linear opacity in the mid right lung likely represents  scarring or atelectasis. The lungs are otherwise clear. Mild  cardiomegaly. Atherosclerotic calcification in the thoracic aorta.      Impression    IMPRESSION: No evidence of active cardiopulmonary disease.    GEETA GARCIA MD   UA with Microscopic   Result Value Ref Range    Color Urine Yellow     Appearance Urine Slightly Cloudy     Glucose Urine Negative NEG^Negative mg/dL    Bilirubin Urine Negative NEG^Negative    Ketones Urine Negative NEG^Negative mg/dL    Specific Gravity Urine 1.011 1.003 - 1.035    Blood Urine Moderate (A) " NEG^Negative    pH Urine 7.0 5.0 - 7.0 pH    Protein Albumin Urine 100 (A) NEG^Negative mg/dL    Urobilinogen mg/dL 0.0 0.0 - 2.0 mg/dL    Nitrite Urine Negative NEG^Negative    Leukocyte Esterase Urine Large (A) NEG^Negative    Source Catheterized Urine     WBC Urine 46 (H) 0 - 5 /HPF    RBC Urine 49 (H) 0 - 2 /HPF    Bacteria Urine Many (A) NEG^Negative /HPF   CBC + differential   Result Value Ref Range    WBC 12.0 (H) 4.0 - 11.0 10e9/L    RBC Count 3.44 (L) 4.4 - 5.9 10e12/L    Hemoglobin 10.2 (L) 13.3 - 17.7 g/dL    Hematocrit 32.3 (L) 40.0 - 53.0 %    MCV 94 78 - 100 fl    MCH 29.7 26.5 - 33.0 pg    MCHC 31.6 31.5 - 36.5 g/dL    RDW 14.9 10.0 - 15.0 %    Platelet Count 267 150 - 450 10e9/L    Diff Method Automated Method     % Neutrophils 69.0 %    % Lymphocytes 18.3 %    % Monocytes 12.2 %    % Eosinophils 0.0 %    % Basophils 0.3 %    % Immature Granulocytes 0.2 %    Nucleated RBCs 0 0 /100    Absolute Neutrophil 8.3 1.6 - 8.3 10e9/L    Absolute Lymphocytes 2.2 0.8 - 5.3 10e9/L    Absolute Monocytes 1.5 (H) 0.0 - 1.3 10e9/L    Absolute Eosinophils 0.0 0.0 - 0.7 10e9/L    Absolute Basophils 0.0 0.0 - 0.2 10e9/L    Abs Immature Granulocytes 0.0 0 - 0.4 10e9/L    Absolute Nucleated RBC 0.0    Basic metabolic panel (BMP)   Result Value Ref Range    Sodium 139 133 - 144 mmol/L    Potassium 4.4 3.4 - 5.3 mmol/L    Chloride 110 (H) 94 - 109 mmol/L    Carbon Dioxide 20 20 - 32 mmol/L    Anion Gap 9 3 - 14 mmol/L    Glucose 121 (H) 70 - 99 mg/dL    Urea Nitrogen 30 7 - 30 mg/dL    Creatinine 1.80 (H) 0.66 - 1.25 mg/dL    GFR Estimate 36 (L) >60 mL/min/1.7m2    GFR Estimate If Black 43 (L) >60 mL/min/1.7m2    Calcium 10.2 (H) 8.5 - 10.1 mg/dL   Troponin I   Result Value Ref Range    Troponin I ES 0.016 0.000 - 0.045 ug/L   D dimer quantitative   Result Value Ref Range    D Dimer 0.6 (H) 0.0 - 0.50 ug/ml FEU   Basic metabolic panel   Result Value Ref Range    Sodium 141 133 - 144 mmol/L    Potassium 4.6 3.4 - 5.3 mmol/L     Chloride 113 (H) 94 - 109 mmol/L    Carbon Dioxide 20 20 - 32 mmol/L    Anion Gap 8 3 - 14 mmol/L    Glucose 105 (H) 70 - 99 mg/dL    Urea Nitrogen 38 (H) 7 - 30 mg/dL    Creatinine 1.85 (H) 0.66 - 1.25 mg/dL    GFR Estimate 35 (L) >60 mL/min/1.7m2    GFR Estimate If Black 42 (L) >60 mL/min/1.7m2    Calcium 10.2 (H) 8.5 - 10.1 mg/dL   CBC with platelets differential   Result Value Ref Range    WBC 8.0 4.0 - 11.0 10e9/L    RBC Count 3.24 (L) 4.4 - 5.9 10e12/L    Hemoglobin 9.4 (L) 13.3 - 17.7 g/dL    Hematocrit 30.8 (L) 40.0 - 53.0 %    MCV 95 78 - 100 fl    MCH 29.0 26.5 - 33.0 pg    MCHC 30.5 (L) 31.5 - 36.5 g/dL    RDW 14.9 10.0 - 15.0 %    Platelet Count 230 150 - 450 10e9/L    Diff Method Automated Method     % Neutrophils 61.2 %    % Lymphocytes 22.1 %    % Monocytes 16.3 %    % Eosinophils 0.0 %    % Basophils 0.1 %    % Immature Granulocytes 0.3 %    Nucleated RBCs 0 0 /100    Absolute Neutrophil 4.9 1.6 - 8.3 10e9/L    Absolute Lymphocytes 1.8 0.8 - 5.3 10e9/L    Absolute Monocytes 1.3 0.0 - 1.3 10e9/L    Absolute Eosinophils 0.0 0.0 - 0.7 10e9/L    Absolute Basophils 0.0 0.0 - 0.2 10e9/L    Abs Immature Granulocytes 0.0 0 - 0.4 10e9/L    Absolute Nucleated RBC 0.0    EKG 12 lead   Result Value Ref Range    Interpretation ECG Click View Image link to view waveform and result    Care Coordinator IP Consult    Narrative    Marizol Villela RN     9/27/2018  1:56 PM  Discharge Planner   Discharge Plans in progress: Pt resides at [facility], call placed   to verify bedhold, bedhold currently in place. Per provider, pt   will likely discharge back to facility tomorrow w/ new oxygen   orders. Updated facility.   Barriers to discharge plan: overnight oximetry study Follow up   plan: CTS to follow-up re discharge planning after care rounds   tomorrow.        Entered by: Marizol Villela 09/27/2018 1:31 PM      Urine Culture   Result Value Ref Range    Specimen Description Catheterized Urine     Special Requests  Specimen received in preservative     Culture Micro >100,000 colonies/mL  Enterococcus faecalis   (A)     Culture Micro (A)      10,000 to 50,000 colonies/mL  Strain 2  Gram positive cocci  No further identification      Culture Micro (A)      10,000 to 50,000 colonies/mL  Gram negative rods  No further identification      Culture Micro (A)      10,000 to 50,000 colonies/mL  Gram positive bacilli resembling diphtheroids  No further identification       Zoe COLBERTC

## 2018-09-29 NOTE — PLAN OF CARE
Problem: Patient Care Overview  Goal: Plan of Care/Patient Progress Review  Outcome: Adequate for Discharge Date Met: 09/28/18  Patient's After Visit Summary was reviewed with patient  Patient verbalized understanding of After Visit Summary, recommended follow up and was given an opportunity to ask questions.   Discharge medications sent home with patient/family: Not applicable   Discharged with [facility]      OBSERVATION patient END time: 6840

## 2018-10-01 LAB
BACTERIA SPEC CULT: ABNORMAL
Lab: ABNORMAL
SPECIMEN SOURCE: ABNORMAL

## 2018-11-28 ENCOUNTER — RECORDS - HEALTHEAST (OUTPATIENT)
Dept: LAB | Facility: CLINIC | Age: 83
End: 2018-11-28

## 2018-11-28 LAB
ANION GAP SERPL CALCULATED.3IONS-SCNC: 9 MMOL/L (ref 5–18)
BUN SERPL-MCNC: 39 MG/DL (ref 8–28)
CALCIUM SERPL-MCNC: 11.8 MG/DL (ref 8.5–10.5)
CHLORIDE BLD-SCNC: 115 MMOL/L (ref 98–107)
CO2 SERPL-SCNC: 19 MMOL/L (ref 22–31)
CREAT SERPL-MCNC: 1.98 MG/DL (ref 0.7–1.3)
ERYTHROCYTE [DISTWIDTH] IN BLOOD BY AUTOMATED COUNT: 16 % (ref 11–14.5)
GFR SERPL CREATININE-BSD FRML MDRD: 32 ML/MIN/1.73M2
GLUCOSE BLD-MCNC: 84 MG/DL (ref 70–125)
HCT VFR BLD AUTO: 34.5 % (ref 40–54)
HGB BLD-MCNC: 10.1 G/DL (ref 14–18)
MCH RBC QN AUTO: 28.5 PG (ref 27–34)
MCHC RBC AUTO-ENTMCNC: 29.3 G/DL (ref 32–36)
MCV RBC AUTO: 98 FL (ref 80–100)
PLATELET # BLD AUTO: 184 THOU/UL (ref 140–440)
PMV BLD AUTO: 10.7 FL (ref 8.5–12.5)
POTASSIUM BLD-SCNC: 4.6 MMOL/L (ref 3.5–5)
RBC # BLD AUTO: 3.54 MILL/UL (ref 4.4–6.2)
SODIUM SERPL-SCNC: 143 MMOL/L (ref 136–145)
T4 FREE SERPL-MCNC: 0.8 NG/DL (ref 0.7–1.8)
WBC: 9.2 THOU/UL (ref 4–11)

## 2021-05-24 ENCOUNTER — RECORDS - HEALTHEAST (OUTPATIENT)
Dept: ADMINISTRATIVE | Facility: CLINIC | Age: 86
End: 2021-05-24

## 2021-05-26 ENCOUNTER — RECORDS - HEALTHEAST (OUTPATIENT)
Dept: ADMINISTRATIVE | Facility: CLINIC | Age: 86
End: 2021-05-26

## 2021-05-27 ENCOUNTER — RECORDS - HEALTHEAST (OUTPATIENT)
Dept: ADMINISTRATIVE | Facility: CLINIC | Age: 86
End: 2021-05-27

## 2021-05-29 ENCOUNTER — RECORDS - HEALTHEAST (OUTPATIENT)
Dept: ADMINISTRATIVE | Facility: CLINIC | Age: 86
End: 2021-05-29

## 2023-12-05 NOTE — ED NOTES
Spoke to MALINDA Rodríguez from pts facility regarding pt.  States that pts catheter was clogged, they tried to flush it, it wouldn't flush.  So they took out catheter and tried putting in a new one, but it would not advance.  Pt does not have catheter in upon arrival.  Blood at meatus noticed.    Asked Anne if pt usually has low oxygen, due to pt was approx 86-88 upon arrival.  States they last checked oxygen on 9/23 and it was 94%   Chart(s)/Patient/Child